# Patient Record
Sex: MALE | Race: WHITE | NOT HISPANIC OR LATINO
[De-identification: names, ages, dates, MRNs, and addresses within clinical notes are randomized per-mention and may not be internally consistent; named-entity substitution may affect disease eponyms.]

---

## 2017-11-14 ENCOUNTER — APPOINTMENT (OUTPATIENT)
Dept: SURGERY | Facility: CLINIC | Age: 72
End: 2017-11-14
Payer: MEDICARE

## 2017-11-14 VITALS
HEART RATE: 66 BPM | DIASTOLIC BLOOD PRESSURE: 68 MMHG | HEIGHT: 67 IN | BODY MASS INDEX: 34.53 KG/M2 | SYSTOLIC BLOOD PRESSURE: 124 MMHG | WEIGHT: 220 LBS

## 2017-11-14 DIAGNOSIS — C34.90 MALIGNANT NEOPLASM OF UNSPECIFIED PART OF UNSPECIFIED BRONCHUS OR LUNG: ICD-10-CM

## 2017-11-14 PROCEDURE — 99203 OFFICE O/P NEW LOW 30 MIN: CPT

## 2017-11-15 ENCOUNTER — APPOINTMENT (OUTPATIENT)
Dept: VASCULAR SURGERY | Facility: CLINIC | Age: 72
End: 2017-11-15

## 2017-11-15 PROBLEM — C34.90 LUNG CANCER: Status: ACTIVE | Noted: 2017-11-15

## 2018-11-15 ENCOUNTER — APPOINTMENT (OUTPATIENT)
Dept: SURGERY | Facility: CLINIC | Age: 73
End: 2018-11-15
Payer: MEDICARE

## 2018-11-15 ENCOUNTER — LABORATORY RESULT (OUTPATIENT)
Age: 73
End: 2018-11-15

## 2018-11-15 PROCEDURE — 36415 COLL VENOUS BLD VENIPUNCTURE: CPT

## 2018-11-15 PROCEDURE — 99213 OFFICE O/P EST LOW 20 MIN: CPT

## 2018-11-16 LAB
T3 SERPL-MCNC: 67 NG/DL
T4 FREE SERPL-MCNC: 1 NG/DL
TSH SERPL-ACNC: 6.12 UIU/ML

## 2018-11-17 LAB
THYROGLOB AB SERPL-ACNC: <20 IU/ML
THYROGLOB SERPL-MCNC: 1.18 NG/ML

## 2018-11-27 ENCOUNTER — RESULT REVIEW (OUTPATIENT)
Age: 73
End: 2018-11-27

## 2018-11-29 ENCOUNTER — TRANSCRIPTION ENCOUNTER (OUTPATIENT)
Age: 73
End: 2018-11-29

## 2020-01-24 ENCOUNTER — TRANSCRIPTION ENCOUNTER (OUTPATIENT)
Age: 75
End: 2020-01-24

## 2020-02-18 ENCOUNTER — APPOINTMENT (OUTPATIENT)
Dept: SURGERY | Facility: CLINIC | Age: 75
End: 2020-02-18

## 2020-07-16 ENCOUNTER — LABORATORY RESULT (OUTPATIENT)
Age: 75
End: 2020-07-16

## 2020-07-16 ENCOUNTER — APPOINTMENT (OUTPATIENT)
Dept: SURGERY | Facility: CLINIC | Age: 75
End: 2020-07-16
Payer: MEDICARE

## 2020-07-16 PROCEDURE — 36415 COLL VENOUS BLD VENIPUNCTURE: CPT

## 2020-07-16 NOTE — HISTORY OF PRESENT ILLNESS
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 denies dysphagia, hoarseness or new lesions.  no changes medically since last visit with exception of worsening renal failure and nephropathy .  Synthroid recently increased to 175 mcg by cardiologist. lung cancer reportedly stable

## 2020-07-16 NOTE — PHYSICAL EXAM
[de-identified] : well healed scar [de-identified] : no palpable thyroid bed nodules. [Laryngoscopy Performed] : laryngoscopy was performed, see procedure section for findings [Midline] : located in midline position [Normal] : orientation to person, place, and time: normal

## 2020-07-16 NOTE — ASSESSMENT
[FreeTextEntry1] : recommended continued observation. no indication for any radiographs at this time. blood tests performed. to call next week for results. to return earlier if any change

## 2020-07-17 LAB
T3 SERPL-MCNC: 71 NG/DL
T4 FREE SERPL-MCNC: 1.4 NG/DL
TSH SERPL-ACNC: 0.61 UIU/ML

## 2020-07-19 LAB
THYROGLOB AB SERPL-ACNC: <20 IU/ML
THYROGLOB SERPL-MCNC: 0.92 NG/ML

## 2022-01-25 ENCOUNTER — APPOINTMENT (OUTPATIENT)
Dept: SURGERY | Facility: CLINIC | Age: 77
End: 2022-01-25
Payer: MEDICARE

## 2022-01-25 PROCEDURE — 99213 OFFICE O/P EST LOW 20 MIN: CPT

## 2022-01-25 NOTE — HISTORY OF PRESENT ILLNESS
[de-identified] : 15  years s/p total thyroidectomy for micro cancer. denies dysphagia, hoarseness or new lesions.  no changes medically since last visit with exception of worsening renal failure and nephropathy .  Synthroid  175 mcg continues.  recent TFTs normal. with suppressed TSH. I have reviewed all old and new data and available images.\par

## 2022-01-25 NOTE — PHYSICAL EXAM
[de-identified] : well healed scar [de-identified] : no palpable thyroid bed nodules. [Laryngoscopy Performed] : laryngoscopy was performed, see procedure section for findings [Midline] : located in midline position [Normal] : orientation to person, place, and time: normal

## 2022-03-21 ENCOUNTER — OUTPATIENT (OUTPATIENT)
Dept: OUTPATIENT SERVICES | Facility: HOSPITAL | Age: 77
LOS: 1 days | End: 2022-03-21

## 2022-03-21 DIAGNOSIS — Z20.828 CONTACT WITH AND (SUSPECTED) EXPOSURE TO OTHER VIRAL COMMUNICABLE DISEASES: ICD-10-CM

## 2022-11-04 ENCOUNTER — OUTPATIENT (OUTPATIENT)
Dept: OUTPATIENT SERVICES | Facility: HOSPITAL | Age: 77
LOS: 1 days | End: 2022-11-04
Payer: MEDICARE

## 2022-11-04 DIAGNOSIS — D64.9 ANEMIA, UNSPECIFIED: ICD-10-CM

## 2022-11-08 ENCOUNTER — RESULT REVIEW (OUTPATIENT)
Age: 77
End: 2022-11-08

## 2022-11-08 ENCOUNTER — APPOINTMENT (OUTPATIENT)
Age: 77
End: 2022-11-08

## 2022-11-08 ENCOUNTER — LABORATORY RESULT (OUTPATIENT)
Age: 77
End: 2022-11-08

## 2022-11-08 VITALS
SYSTOLIC BLOOD PRESSURE: 128 MMHG | WEIGHT: 216.6 LBS | DIASTOLIC BLOOD PRESSURE: 66 MMHG | BODY MASS INDEX: 34 KG/M2 | HEIGHT: 67 IN | OXYGEN SATURATION: 95 % | TEMPERATURE: 97.9 F | HEART RATE: 60 BPM

## 2022-11-08 LAB
BASOPHILS # BLD AUTO: 0.06 K/UL — SIGNIFICANT CHANGE UP (ref 0–0.2)
BASOPHILS NFR BLD AUTO: 0.8 % — SIGNIFICANT CHANGE UP (ref 0–2)
EOSINOPHIL # BLD AUTO: 0.18 K/UL — SIGNIFICANT CHANGE UP (ref 0–0.5)
EOSINOPHIL NFR BLD AUTO: 2.4 % — SIGNIFICANT CHANGE UP (ref 0–6)
HCT VFR BLD CALC: 30.8 % — LOW (ref 39–50)
HGB BLD-MCNC: 9.8 G/DL — LOW (ref 13–17)
IMM GRANULOCYTES NFR BLD AUTO: 0.3 % — SIGNIFICANT CHANGE UP (ref 0–0.9)
LYMPHOCYTES # BLD AUTO: 1.41 K/UL — SIGNIFICANT CHANGE UP (ref 1–3.3)
LYMPHOCYTES # BLD AUTO: 19 % — SIGNIFICANT CHANGE UP (ref 13–44)
MCHC RBC-ENTMCNC: 31.8 GM/DL — LOW (ref 32–36)
MCHC RBC-ENTMCNC: 32.1 PG — SIGNIFICANT CHANGE UP (ref 27–34)
MCV RBC AUTO: 101 FL — HIGH (ref 80–100)
MONOCYTES # BLD AUTO: 0.71 K/UL — SIGNIFICANT CHANGE UP (ref 0–0.9)
MONOCYTES NFR BLD AUTO: 9.6 % — SIGNIFICANT CHANGE UP (ref 2–14)
NEUTROPHILS # BLD AUTO: 5.03 K/UL — SIGNIFICANT CHANGE UP (ref 1.8–7.4)
NEUTROPHILS NFR BLD AUTO: 67.9 % — SIGNIFICANT CHANGE UP (ref 43–77)
NRBC # BLD: 0 /100 WBCS — SIGNIFICANT CHANGE UP (ref 0–0)
PLATELET # BLD AUTO: 306 K/UL — SIGNIFICANT CHANGE UP (ref 150–400)
RBC # BLD: 3.05 M/UL — LOW (ref 4.2–5.8)
RBC # FLD: 13.4 % — SIGNIFICANT CHANGE UP (ref 10.3–14.5)
WBC # BLD: 7.41 K/UL — SIGNIFICANT CHANGE UP (ref 3.8–10.5)
WBC # FLD AUTO: 7.41 K/UL — SIGNIFICANT CHANGE UP (ref 3.8–10.5)

## 2022-11-08 PROCEDURE — 99204 OFFICE O/P NEW MOD 45 MIN: CPT

## 2022-11-11 ENCOUNTER — RESULT REVIEW (OUTPATIENT)
Age: 77
End: 2022-11-11

## 2022-11-11 ENCOUNTER — APPOINTMENT (OUTPATIENT)
Age: 77
End: 2022-11-11

## 2022-11-11 LAB
BASOPHILS # BLD AUTO: 0.05 K/UL — SIGNIFICANT CHANGE UP (ref 0–0.2)
BASOPHILS NFR BLD AUTO: 0.8 % — SIGNIFICANT CHANGE UP (ref 0–2)
EOSINOPHIL # BLD AUTO: 0.14 K/UL — SIGNIFICANT CHANGE UP (ref 0–0.5)
EOSINOPHIL NFR BLD AUTO: 2.2 % — SIGNIFICANT CHANGE UP (ref 0–6)
HCT VFR BLD CALC: 31.4 % — LOW (ref 39–50)
HGB BLD-MCNC: 9.9 G/DL — LOW (ref 13–17)
IMM GRANULOCYTES NFR BLD AUTO: 0.3 % — SIGNIFICANT CHANGE UP (ref 0–0.9)
LYMPHOCYTES # BLD AUTO: 1.16 K/UL — SIGNIFICANT CHANGE UP (ref 1–3.3)
LYMPHOCYTES # BLD AUTO: 18 % — SIGNIFICANT CHANGE UP (ref 13–44)
MCHC RBC-ENTMCNC: 31.5 GM/DL — LOW (ref 32–36)
MCHC RBC-ENTMCNC: 31.9 PG — SIGNIFICANT CHANGE UP (ref 27–34)
MCV RBC AUTO: 101.3 FL — HIGH (ref 80–100)
MONOCYTES # BLD AUTO: 0.52 K/UL — SIGNIFICANT CHANGE UP (ref 0–0.9)
MONOCYTES NFR BLD AUTO: 8.1 % — SIGNIFICANT CHANGE UP (ref 2–14)
NEUTROPHILS # BLD AUTO: 4.54 K/UL — SIGNIFICANT CHANGE UP (ref 1.8–7.4)
NEUTROPHILS NFR BLD AUTO: 70.6 % — SIGNIFICANT CHANGE UP (ref 43–77)
NRBC # BLD: 0 /100 WBCS — SIGNIFICANT CHANGE UP (ref 0–0)
PLATELET # BLD AUTO: 288 K/UL — SIGNIFICANT CHANGE UP (ref 150–400)
RBC # BLD: 3.1 M/UL — LOW (ref 4.2–5.8)
RBC # FLD: 13.2 % — SIGNIFICANT CHANGE UP (ref 10.3–14.5)
WBC # BLD: 6.43 K/UL — SIGNIFICANT CHANGE UP (ref 3.8–10.5)
WBC # FLD AUTO: 6.43 K/UL — SIGNIFICANT CHANGE UP (ref 3.8–10.5)

## 2022-11-14 DIAGNOSIS — D50.9 IRON DEFICIENCY ANEMIA, UNSPECIFIED: ICD-10-CM

## 2022-11-14 LAB
B2 MICROGLOB SERPL-MCNC: 16 MG/L
DEPRECATED KAPPA LC FREE/LAMBDA SER: 2 RATIO
FERRITIN SERPL-MCNC: 69 NG/ML
FOLATE SERPL-MCNC: >20 NG/ML
IGA SER QL IEP: 376 MG/DL
IGG SER QL IEP: 904 MG/DL
IGM SER QL IEP: 134 MG/DL
IRON SATN MFR SERPL: 33 %
IRON SERPL-MCNC: 77 UG/DL
KAPPA LC CSF-MCNC: 6.22 MG/DL
KAPPA LC SERPL-MCNC: 12.47 MG/DL
LDH SERPL-CCNC: 178 U/L
RBC # BLD: 3.09 M/UL
RETICS # AUTO: 2.2 %
RETICS AGGREG/RBC NFR: 68.9 K/UL
TIBC SERPL-MCNC: 232 UG/DL
TRANSFERRIN SERPL-MCNC: 206 MG/DL
UIBC SERPL-MCNC: 155 UG/DL
VIT B12 SERPL-MCNC: 622 PG/ML

## 2022-11-14 NOTE — CONSULT LETTER
[Dear  ___] : Dear  [unfilled], [Consult Letter:] : I had the pleasure of evaluating your patient, [unfilled]. [Please see my note below.] : Please see my note below. [Consult Closing:] : Thank you very much for allowing me to participate in the care of this patient.  If you have any questions, please do not hesitate to contact me. [Sincerely,] : Sincerely, [FreeTextEntry2] : Dr. Mora Rees\par  [FreeTextEntry3] : Michel Flanagan MD\par

## 2022-11-14 NOTE — PHYSICAL EXAM
[Fully active, able to carry on all pre-disease performance without restriction] : Status 0 - Fully active, able to carry on all pre-disease performance without restriction [Normal] : affect appropriate [de-identified] : anicteric

## 2022-11-14 NOTE — HISTORY OF PRESENT ILLNESS
[de-identified] : Mr. Cobian follows with Dr. Rees for chronic kidney disease.  He has been noted to have normocytic anemia.  Mark has a history of thyroid cancer status postresection.  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  Denies any aches or pains, no blood loss, no unintentional weight loss.\par \par Labs available to me revealed a hemoglobin fluctuating between 8 and 10g, with an MCV near 100.  Mark's creatinine has been near 4.\par \par

## 2022-11-14 NOTE — REVIEW OF SYSTEMS
[Fever] : no fever [Fatigue] : no fatigue [Recent Change In Weight] : ~T no recent weight change [Dysphagia] : no dysphagia [Chest Pain] : no chest pain [Shortness Of Breath] : no shortness of breath [Abdominal Pain] : no abdominal pain [Easy Bleeding] : no tendency for easy bleeding [Easy Bruising] : no tendency for easy bruising [Swollen Glands] : no swollen glands

## 2022-11-25 ENCOUNTER — RESULT REVIEW (OUTPATIENT)
Age: 77
End: 2022-11-25

## 2022-11-25 ENCOUNTER — APPOINTMENT (OUTPATIENT)
Age: 77
End: 2022-11-25

## 2022-11-25 LAB
BASOPHILS # BLD AUTO: 0.07 K/UL — SIGNIFICANT CHANGE UP (ref 0–0.2)
BASOPHILS NFR BLD AUTO: 0.9 % — SIGNIFICANT CHANGE UP (ref 0–2)
EOSINOPHIL # BLD AUTO: 0.21 K/UL — SIGNIFICANT CHANGE UP (ref 0–0.5)
EOSINOPHIL NFR BLD AUTO: 2.7 % — SIGNIFICANT CHANGE UP (ref 0–6)
HCT VFR BLD CALC: 33.1 % — LOW (ref 39–50)
HGB BLD-MCNC: 10.6 G/DL — LOW (ref 13–17)
IMM GRANULOCYTES NFR BLD AUTO: 0.4 % — SIGNIFICANT CHANGE UP (ref 0–0.9)
LYMPHOCYTES # BLD AUTO: 1.14 K/UL — SIGNIFICANT CHANGE UP (ref 1–3.3)
LYMPHOCYTES # BLD AUTO: 14.7 % — SIGNIFICANT CHANGE UP (ref 13–44)
MCHC RBC-ENTMCNC: 32 GM/DL — SIGNIFICANT CHANGE UP (ref 32–36)
MCHC RBC-ENTMCNC: 32.1 PG — SIGNIFICANT CHANGE UP (ref 27–34)
MCV RBC AUTO: 100.3 FL — HIGH (ref 80–100)
MONOCYTES # BLD AUTO: 0.53 K/UL — SIGNIFICANT CHANGE UP (ref 0–0.9)
MONOCYTES NFR BLD AUTO: 6.8 % — SIGNIFICANT CHANGE UP (ref 2–14)
NEUTROPHILS # BLD AUTO: 5.79 K/UL — SIGNIFICANT CHANGE UP (ref 1.8–7.4)
NEUTROPHILS NFR BLD AUTO: 74.5 % — SIGNIFICANT CHANGE UP (ref 43–77)
NRBC # BLD: 0 /100 WBCS — SIGNIFICANT CHANGE UP (ref 0–0)
PLATELET # BLD AUTO: 295 K/UL — SIGNIFICANT CHANGE UP (ref 150–400)
RBC # BLD: 3.3 M/UL — LOW (ref 4.2–5.8)
RBC # FLD: 12.9 % — SIGNIFICANT CHANGE UP (ref 10.3–14.5)
WBC # BLD: 7.77 K/UL — SIGNIFICANT CHANGE UP (ref 3.8–10.5)
WBC # FLD AUTO: 7.77 K/UL — SIGNIFICANT CHANGE UP (ref 3.8–10.5)

## 2022-12-09 ENCOUNTER — RESULT REVIEW (OUTPATIENT)
Age: 77
End: 2022-12-09

## 2022-12-09 ENCOUNTER — APPOINTMENT (OUTPATIENT)
Age: 77
End: 2022-12-09

## 2022-12-09 LAB
ALBUMIN MFR SERPL ELPH: 58.2 %
ALBUMIN SERPL-MCNC: 3.8 G/DL
ALBUMIN/GLOB SERPL: 1.4 RATIO
ALPHA1 GLOB MFR SERPL ELPH: 4.1 %
ALPHA1 GLOB SERPL ELPH-MCNC: 0.3 G/DL
ALPHA2 GLOB MFR SERPL ELPH: 11.4 %
ALPHA2 GLOB SERPL ELPH-MCNC: 0.7 G/DL
B-GLOBULIN MFR SERPL ELPH: 12.1 %
B-GLOBULIN SERPL ELPH-MCNC: 0.8 G/DL
B2 MICROGLOB SERPL-MCNC: 13.8 MG/L
BASOPHILS # BLD AUTO: 0.04 K/UL — SIGNIFICANT CHANGE UP (ref 0–0.2)
BASOPHILS NFR BLD AUTO: 0.5 % — SIGNIFICANT CHANGE UP (ref 0–2)
EOSINOPHIL # BLD AUTO: 0.2 K/UL — SIGNIFICANT CHANGE UP (ref 0–0.5)
EOSINOPHIL NFR BLD AUTO: 2.5 % — SIGNIFICANT CHANGE UP (ref 0–6)
GAMMA GLOB FLD ELPH-MCNC: 0.9 G/DL
GAMMA GLOB MFR SERPL ELPH: 14.2 %
HCT VFR BLD CALC: 35 % — LOW (ref 39–50)
HGB BLD-MCNC: 11 G/DL — LOW (ref 13–17)
IMM GRANULOCYTES NFR BLD AUTO: 0.2 % — SIGNIFICANT CHANGE UP (ref 0–0.9)
INTERPRETATION SERPL IEP-IMP: NORMAL
LYMPHOCYTES # BLD AUTO: 1.07 K/UL — SIGNIFICANT CHANGE UP (ref 1–3.3)
LYMPHOCYTES # BLD AUTO: 13.4 % — SIGNIFICANT CHANGE UP (ref 13–44)
M PROTEIN MFR SERPL ELPH: NORMAL
MCHC RBC-ENTMCNC: 30.8 PG — SIGNIFICANT CHANGE UP (ref 27–34)
MCHC RBC-ENTMCNC: 31.4 GM/DL — LOW (ref 32–36)
MCV RBC AUTO: 98 FL — SIGNIFICANT CHANGE UP (ref 80–100)
MONOCLON BAND OBS SERPL: NORMAL
MONOCYTES # BLD AUTO: 0.52 K/UL — SIGNIFICANT CHANGE UP (ref 0–0.9)
MONOCYTES NFR BLD AUTO: 6.5 % — SIGNIFICANT CHANGE UP (ref 2–14)
NEUTROPHILS # BLD AUTO: 6.16 K/UL — SIGNIFICANT CHANGE UP (ref 1.8–7.4)
NEUTROPHILS NFR BLD AUTO: 76.9 % — SIGNIFICANT CHANGE UP (ref 43–77)
NRBC # BLD: 0 /100 WBCS — SIGNIFICANT CHANGE UP (ref 0–0)
PLATELET # BLD AUTO: 336 K/UL — SIGNIFICANT CHANGE UP (ref 150–400)
PROT SERPL-MCNC: 6.5 G/DL
PROT SERPL-MCNC: 6.5 G/DL
RBC # BLD: 3.57 M/UL — LOW (ref 4.2–5.8)
RBC # FLD: 12.7 % — SIGNIFICANT CHANGE UP (ref 10.3–14.5)
WBC # BLD: 8.01 K/UL — SIGNIFICANT CHANGE UP (ref 3.8–10.5)
WBC # FLD AUTO: 8.01 K/UL — SIGNIFICANT CHANGE UP (ref 3.8–10.5)

## 2022-12-12 ENCOUNTER — NON-APPOINTMENT (OUTPATIENT)
Age: 77
End: 2022-12-12

## 2022-12-12 LAB
ALBUMIN SERPL ELPH-MCNC: 3.9 G/DL
ALP BLD-CCNC: 63 U/L
ALT SERPL-CCNC: 12 U/L
ANION GAP SERPL CALC-SCNC: 15 MMOL/L
AST SERPL-CCNC: 9 U/L
BILIRUB SERPL-MCNC: 0.3 MG/DL
BUN SERPL-MCNC: 65 MG/DL
CALCIUM SERPL-MCNC: 9.2 MG/DL
CHLORIDE SERPL-SCNC: 105 MMOL/L
CO2 SERPL-SCNC: 20 MMOL/L
CREAT SERPL-MCNC: 4.24 MG/DL
DEPRECATED KAPPA LC FREE/LAMBDA SER: 2.55 RATIO
DEPRECATED KAPPA LC FREE/LAMBDA SER: 2.55 RATIO
EGFR: 14 ML/MIN/1.73M2
GLUCOSE SERPL-MCNC: 100 MG/DL
IGA SER QL IEP: 330 MG/DL
IGG SER QL IEP: 938 MG/DL
IGM SER QL IEP: 100 MG/DL
KAPPA LC CSF-MCNC: 6.07 MG/DL
KAPPA LC CSF-MCNC: 6.07 MG/DL
KAPPA LC SERPL-MCNC: 15.48 MG/DL
KAPPA LC SERPL-MCNC: 15.48 MG/DL
POTASSIUM SERPL-SCNC: 5.8 MMOL/L
PROT SERPL-MCNC: 6.6 G/DL
SODIUM SERPL-SCNC: 140 MMOL/L

## 2022-12-13 LAB
ALBUMIN MFR SERPL ELPH: 57.1 %
ALBUMIN SERPL-MCNC: 3.8 G/DL
ALBUMIN/GLOB SERPL: 1.4 RATIO
ALPHA1 GLOB MFR SERPL ELPH: 4.2 %
ALPHA1 GLOB SERPL ELPH-MCNC: 0.3 G/DL
ALPHA2 GLOB MFR SERPL ELPH: 11.8 %
ALPHA2 GLOB SERPL ELPH-MCNC: 0.8 G/DL
B-GLOBULIN MFR SERPL ELPH: 12.5 %
B-GLOBULIN SERPL ELPH-MCNC: 0.8 G/DL
GAMMA GLOB FLD ELPH-MCNC: 1 G/DL
GAMMA GLOB MFR SERPL ELPH: 14.4 %
INTERPRETATION SERPL IEP-IMP: NORMAL
M PROTEIN MFR SERPL ELPH: NORMAL
M PROTEIN SPEC IFE-MCNC: NORMAL
MONOCLON BAND OBS SERPL: NORMAL
PROT SERPL-MCNC: 6.6 G/DL
PROT SERPL-MCNC: 6.6 G/DL

## 2022-12-23 ENCOUNTER — RESULT REVIEW (OUTPATIENT)
Age: 77
End: 2022-12-23

## 2022-12-23 ENCOUNTER — APPOINTMENT (OUTPATIENT)
Age: 77
End: 2022-12-23

## 2022-12-23 VITALS
DIASTOLIC BLOOD PRESSURE: 58 MMHG | OXYGEN SATURATION: 94 % | RESPIRATION RATE: 18 BRPM | SYSTOLIC BLOOD PRESSURE: 120 MMHG | TEMPERATURE: 98 F | HEART RATE: 66 BPM

## 2022-12-23 LAB
BASOPHILS # BLD AUTO: 0.05 K/UL — SIGNIFICANT CHANGE UP (ref 0–0.2)
BASOPHILS NFR BLD AUTO: 0.6 % — SIGNIFICANT CHANGE UP (ref 0–2)
EOSINOPHIL # BLD AUTO: 0.15 K/UL — SIGNIFICANT CHANGE UP (ref 0–0.5)
EOSINOPHIL NFR BLD AUTO: 1.8 % — SIGNIFICANT CHANGE UP (ref 0–6)
HCT VFR BLD CALC: 33.3 % — LOW (ref 39–50)
HGB BLD-MCNC: 10.9 G/DL — LOW (ref 13–17)
IMM GRANULOCYTES NFR BLD AUTO: 0.4 % — SIGNIFICANT CHANGE UP (ref 0–0.9)
LYMPHOCYTES # BLD AUTO: 1.24 K/UL — SIGNIFICANT CHANGE UP (ref 1–3.3)
LYMPHOCYTES # BLD AUTO: 14.5 % — SIGNIFICANT CHANGE UP (ref 13–44)
MCHC RBC-ENTMCNC: 31.7 PG — SIGNIFICANT CHANGE UP (ref 27–34)
MCHC RBC-ENTMCNC: 32.7 GM/DL — SIGNIFICANT CHANGE UP (ref 32–36)
MCV RBC AUTO: 96.8 FL — SIGNIFICANT CHANGE UP (ref 80–100)
MONOCYTES # BLD AUTO: 0.49 K/UL — SIGNIFICANT CHANGE UP (ref 0–0.9)
MONOCYTES NFR BLD AUTO: 5.7 % — SIGNIFICANT CHANGE UP (ref 2–14)
NEUTROPHILS # BLD AUTO: 6.61 K/UL — SIGNIFICANT CHANGE UP (ref 1.8–7.4)
NEUTROPHILS NFR BLD AUTO: 77 % — SIGNIFICANT CHANGE UP (ref 43–77)
NRBC # BLD: 0 /100 WBCS — SIGNIFICANT CHANGE UP (ref 0–0)
PLATELET # BLD AUTO: 293 K/UL — SIGNIFICANT CHANGE UP (ref 150–400)
RBC # BLD: 3.44 M/UL — LOW (ref 4.2–5.8)
RBC # FLD: 13 % — SIGNIFICANT CHANGE UP (ref 10.3–14.5)
WBC # BLD: 8.57 K/UL — SIGNIFICANT CHANGE UP (ref 3.8–10.5)
WBC # FLD AUTO: 8.57 K/UL — SIGNIFICANT CHANGE UP (ref 3.8–10.5)

## 2022-12-23 PROCEDURE — 99213 OFFICE O/P EST LOW 20 MIN: CPT

## 2022-12-23 PROCEDURE — 36415 COLL VENOUS BLD VENIPUNCTURE: CPT

## 2022-12-23 PROCEDURE — 85027 COMPLETE CBC AUTOMATED: CPT

## 2022-12-23 PROCEDURE — 96372 THER/PROPH/DIAG INJ SC/IM: CPT

## 2022-12-23 NOTE — PHYSICAL EXAM
[Fully active, able to carry on all pre-disease performance without restriction] : Status 0 - Fully active, able to carry on all pre-disease performance without restriction [Normal] : affect appropriate [de-identified] : anicteric

## 2022-12-23 NOTE — REVIEW OF SYSTEMS
[Fever] : no fever [Chills] : no chills [Night Sweats] : no night sweats [Fatigue] : no fatigue [Recent Change In Weight] : ~T no recent weight change [Dysphagia] : no dysphagia [Chest Pain] : no chest pain [Palpitations] : no palpitations [Lower Ext Edema] : no lower extremity edema [Shortness Of Breath] : no shortness of breath [Wheezing] : no wheezing [Cough] : no cough [SOB on Exertion] : no shortness of breath during exertion [Abdominal Pain] : no abdominal pain [Vomiting] : no vomiting [Constipation] : no constipation [Joint Pain] : no joint pain [Diarrhea] : no diarrhea [Skin Rash] : no skin rash [Skin Wound] : no skin wound [Dizziness] : no dizziness [Difficulty Walking] : no difficulty walking [Easy Bleeding] : no tendency for easy bleeding [Easy Bruising] : no tendency for easy bruising [Swollen Glands] : no swollen glands

## 2022-12-23 NOTE — HISTORY OF PRESENT ILLNESS
[de-identified] : Mr. Cobian follows with Dr. Rees for chronic kidney disease.  He has been noted to have normocytic anemia.  Mark has a history of thyroid cancer status postresection.  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  Denies any aches or pains, no blood loss, no unintentional weight loss.\par \par Labs available to me revealed a hemoglobin fluctuating between 8 and 10g, with an MCV near 100.  Mark's creatinine has been near 4.\par \par  [de-identified] : Mark reports feeling well. He has some STEPHENS, but feels it is related to being "out of shape". No chest pain, palpitations or increased fatigue. No blood per rectum, dark stools or hematuria. He does state he sees his nephrologist, Dr. Rees regularly and will be having an AV fistula placed by Dr. Kate on 12/29 for expected dialysis in the future. His last creatinine was 4.24.

## 2022-12-23 NOTE — RESULTS/DATA
[FreeTextEntry1] : Mr. Cobian is a pleasant 77 year-old man here for evaluation of anemia. Patient being seen as per physician's primary plan of care.\par

## 2022-12-27 LAB
ALBUMIN SERPL ELPH-MCNC: 4 G/DL
ALP BLD-CCNC: 69 U/L
ALT SERPL-CCNC: 10 U/L
ANION GAP SERPL CALC-SCNC: 14 MMOL/L
AST SERPL-CCNC: 8 U/L
BILIRUB SERPL-MCNC: 0.2 MG/DL
BUN SERPL-MCNC: 72 MG/DL
CALCIUM SERPL-MCNC: 9.3 MG/DL
CHLORIDE SERPL-SCNC: 105 MMOL/L
CO2 SERPL-SCNC: 20 MMOL/L
CREAT SERPL-MCNC: 4.25 MG/DL
EGFR: 14 ML/MIN/1.73M2
GLUCOSE SERPL-MCNC: 142 MG/DL
POTASSIUM SERPL-SCNC: 4.7 MMOL/L
PROT SERPL-MCNC: 6.4 G/DL
SODIUM SERPL-SCNC: 139 MMOL/L

## 2022-12-30 ENCOUNTER — OUTPATIENT (OUTPATIENT)
Dept: OUTPATIENT SERVICES | Facility: HOSPITAL | Age: 77
LOS: 1 days | End: 2022-12-30
Payer: MEDICARE

## 2022-12-30 DIAGNOSIS — D50.9 IRON DEFICIENCY ANEMIA, UNSPECIFIED: ICD-10-CM

## 2023-01-06 ENCOUNTER — APPOINTMENT (OUTPATIENT)
Age: 78
End: 2023-01-06

## 2023-01-06 ENCOUNTER — RESULT REVIEW (OUTPATIENT)
Age: 78
End: 2023-01-06

## 2023-01-06 LAB
BASOPHILS # BLD AUTO: 0.06 K/UL — SIGNIFICANT CHANGE UP (ref 0–0.2)
BASOPHILS NFR BLD AUTO: 0.7 % — SIGNIFICANT CHANGE UP (ref 0–2)
EOSINOPHIL # BLD AUTO: 0.21 K/UL — SIGNIFICANT CHANGE UP (ref 0–0.5)
EOSINOPHIL NFR BLD AUTO: 2.5 % — SIGNIFICANT CHANGE UP (ref 0–6)
HCT VFR BLD CALC: 33.6 % — LOW (ref 39–50)
HGB BLD-MCNC: 10.6 G/DL — LOW (ref 13–17)
IMM GRANULOCYTES NFR BLD AUTO: 0.2 % — SIGNIFICANT CHANGE UP (ref 0–0.9)
LYMPHOCYTES # BLD AUTO: 1.32 K/UL — SIGNIFICANT CHANGE UP (ref 1–3.3)
LYMPHOCYTES # BLD AUTO: 15.9 % — SIGNIFICANT CHANGE UP (ref 13–44)
MCHC RBC-ENTMCNC: 30.4 PG — SIGNIFICANT CHANGE UP (ref 27–34)
MCHC RBC-ENTMCNC: 31.5 GM/DL — LOW (ref 32–36)
MCV RBC AUTO: 96.3 FL — SIGNIFICANT CHANGE UP (ref 80–100)
MONOCYTES # BLD AUTO: 0.53 K/UL — SIGNIFICANT CHANGE UP (ref 0–0.9)
MONOCYTES NFR BLD AUTO: 6.4 % — SIGNIFICANT CHANGE UP (ref 2–14)
NEUTROPHILS # BLD AUTO: 6.16 K/UL — SIGNIFICANT CHANGE UP (ref 1.8–7.4)
NEUTROPHILS NFR BLD AUTO: 74.3 % — SIGNIFICANT CHANGE UP (ref 43–77)
NRBC # BLD: 0 /100 WBCS — SIGNIFICANT CHANGE UP (ref 0–0)
PLATELET # BLD AUTO: 317 K/UL — SIGNIFICANT CHANGE UP (ref 150–400)
RBC # BLD: 3.49 M/UL — LOW (ref 4.2–5.8)
RBC # FLD: 13.2 % — SIGNIFICANT CHANGE UP (ref 10.3–14.5)
WBC # BLD: 8.3 K/UL — SIGNIFICANT CHANGE UP (ref 3.8–10.5)
WBC # FLD AUTO: 8.3 K/UL — SIGNIFICANT CHANGE UP (ref 3.8–10.5)

## 2023-01-27 ENCOUNTER — APPOINTMENT (OUTPATIENT)
Age: 78
End: 2023-01-27

## 2023-01-27 ENCOUNTER — RESULT REVIEW (OUTPATIENT)
Age: 78
End: 2023-01-27

## 2023-01-27 LAB
B2 MICROGLOB SERPL-MCNC: 14.5 MG/L
BASOPHILS # BLD AUTO: 0.05 K/UL — SIGNIFICANT CHANGE UP (ref 0–0.2)
BASOPHILS NFR BLD AUTO: 0.6 % — SIGNIFICANT CHANGE UP (ref 0–2)
EOSINOPHIL # BLD AUTO: 0.29 K/UL — SIGNIFICANT CHANGE UP (ref 0–0.5)
EOSINOPHIL NFR BLD AUTO: 3.4 % — SIGNIFICANT CHANGE UP (ref 0–6)
HCT VFR BLD CALC: 32.4 % — LOW (ref 39–50)
HGB BLD-MCNC: 10.4 G/DL — LOW (ref 13–17)
IMM GRANULOCYTES NFR BLD AUTO: 0.2 % — SIGNIFICANT CHANGE UP (ref 0–0.9)
LDH SERPL-CCNC: 159 U/L
LYMPHOCYTES # BLD AUTO: 1.17 K/UL — SIGNIFICANT CHANGE UP (ref 1–3.3)
LYMPHOCYTES # BLD AUTO: 13.8 % — SIGNIFICANT CHANGE UP (ref 13–44)
MCHC RBC-ENTMCNC: 30.3 PG — SIGNIFICANT CHANGE UP (ref 27–34)
MCHC RBC-ENTMCNC: 32.1 GM/DL — SIGNIFICANT CHANGE UP (ref 32–36)
MCV RBC AUTO: 94.5 FL — SIGNIFICANT CHANGE UP (ref 80–100)
MONOCYTES # BLD AUTO: 0.48 K/UL — SIGNIFICANT CHANGE UP (ref 0–0.9)
MONOCYTES NFR BLD AUTO: 5.7 % — SIGNIFICANT CHANGE UP (ref 2–14)
NEUTROPHILS # BLD AUTO: 6.46 K/UL — SIGNIFICANT CHANGE UP (ref 1.8–7.4)
NEUTROPHILS NFR BLD AUTO: 76.3 % — SIGNIFICANT CHANGE UP (ref 43–77)
NRBC # BLD: 0 /100 WBCS — SIGNIFICANT CHANGE UP (ref 0–0)
PLATELET # BLD AUTO: 335 K/UL — SIGNIFICANT CHANGE UP (ref 150–400)
RBC # BLD: 3.43 M/UL — LOW (ref 4.2–5.8)
RBC # FLD: 13.6 % — SIGNIFICANT CHANGE UP (ref 10.3–14.5)
WBC # BLD: 8.47 K/UL — SIGNIFICANT CHANGE UP (ref 3.8–10.5)
WBC # FLD AUTO: 8.47 K/UL — SIGNIFICANT CHANGE UP (ref 3.8–10.5)

## 2023-01-29 LAB
DEPRECATED KAPPA LC FREE/LAMBDA SER: 2.39 RATIO
DEPRECATED KAPPA LC FREE/LAMBDA SER: 2.39 RATIO
IGA SER QL IEP: 311 MG/DL
IGG SER QL IEP: 866 MG/DL
IGM SER QL IEP: 91 MG/DL
KAPPA LC CSF-MCNC: 5.61 MG/DL
KAPPA LC CSF-MCNC: 5.61 MG/DL
KAPPA LC SERPL-MCNC: 13.4 MG/DL
KAPPA LC SERPL-MCNC: 13.4 MG/DL

## 2023-01-31 LAB
ALBUMIN MFR SERPL ELPH: 56.5 %
ALBUMIN SERPL-MCNC: 3.6 G/DL
ALBUMIN/GLOB SERPL: 1.3 RATIO
ALPHA1 GLOB MFR SERPL ELPH: 5.2 %
ALPHA1 GLOB SERPL ELPH-MCNC: 0.3 G/DL
ALPHA2 GLOB MFR SERPL ELPH: 11.9 %
ALPHA2 GLOB SERPL ELPH-MCNC: 0.7 G/DL
B-GLOBULIN MFR SERPL ELPH: 12.6 %
B-GLOBULIN SERPL ELPH-MCNC: 0.8 G/DL
GAMMA GLOB FLD ELPH-MCNC: 0.9 G/DL
GAMMA GLOB MFR SERPL ELPH: 13.8 %
INTERPRETATION SERPL IEP-IMP: NORMAL
M PROTEIN MFR SERPL ELPH: NORMAL
M PROTEIN SPEC IFE-MCNC: NORMAL
MONOCLON BAND OBS SERPL: NORMAL
PROT SERPL-MCNC: 6.3 G/DL
PROT SERPL-MCNC: 6.3 G/DL

## 2023-02-10 ENCOUNTER — RESULT REVIEW (OUTPATIENT)
Age: 78
End: 2023-02-10

## 2023-02-10 ENCOUNTER — APPOINTMENT (OUTPATIENT)
Age: 78
End: 2023-02-10

## 2023-02-10 LAB
BASOPHILS # BLD AUTO: 0.04 K/UL — SIGNIFICANT CHANGE UP (ref 0–0.2)
BASOPHILS NFR BLD AUTO: 0.5 % — SIGNIFICANT CHANGE UP (ref 0–2)
EOSINOPHIL # BLD AUTO: 0.18 K/UL — SIGNIFICANT CHANGE UP (ref 0–0.5)
EOSINOPHIL NFR BLD AUTO: 2.1 % — SIGNIFICANT CHANGE UP (ref 0–6)
HCT VFR BLD CALC: 34.8 % — LOW (ref 39–50)
HGB BLD-MCNC: 10.9 G/DL — LOW (ref 13–17)
IMM GRANULOCYTES NFR BLD AUTO: 0.2 % — SIGNIFICANT CHANGE UP (ref 0–0.9)
LYMPHOCYTES # BLD AUTO: 1.38 K/UL — SIGNIFICANT CHANGE UP (ref 1–3.3)
LYMPHOCYTES # BLD AUTO: 16.1 % — SIGNIFICANT CHANGE UP (ref 13–44)
MCHC RBC-ENTMCNC: 29.8 PG — SIGNIFICANT CHANGE UP (ref 27–34)
MCHC RBC-ENTMCNC: 31.3 GM/DL — LOW (ref 32–36)
MCV RBC AUTO: 95.1 FL — SIGNIFICANT CHANGE UP (ref 80–100)
MONOCYTES # BLD AUTO: 0.52 K/UL — SIGNIFICANT CHANGE UP (ref 0–0.9)
MONOCYTES NFR BLD AUTO: 6.1 % — SIGNIFICANT CHANGE UP (ref 2–14)
NEUTROPHILS # BLD AUTO: 6.42 K/UL — SIGNIFICANT CHANGE UP (ref 1.8–7.4)
NEUTROPHILS NFR BLD AUTO: 75 % — SIGNIFICANT CHANGE UP (ref 43–77)
NRBC # BLD: 0 /100 WBCS — SIGNIFICANT CHANGE UP (ref 0–0)
PLATELET # BLD AUTO: 317 K/UL — SIGNIFICANT CHANGE UP (ref 150–400)
RBC # BLD: 3.66 M/UL — LOW (ref 4.2–5.8)
RBC # FLD: 14.2 % — SIGNIFICANT CHANGE UP (ref 10.3–14.5)
WBC # BLD: 8.56 K/UL — SIGNIFICANT CHANGE UP (ref 3.8–10.5)
WBC # FLD AUTO: 8.56 K/UL — SIGNIFICANT CHANGE UP (ref 3.8–10.5)

## 2023-02-24 ENCOUNTER — RESULT REVIEW (OUTPATIENT)
Age: 78
End: 2023-02-24

## 2023-02-24 ENCOUNTER — APPOINTMENT (OUTPATIENT)
Age: 78
End: 2023-02-24
Payer: MEDICARE

## 2023-02-24 ENCOUNTER — APPOINTMENT (OUTPATIENT)
Age: 78
End: 2023-02-24

## 2023-02-24 LAB
BASOPHILS # BLD AUTO: 0.05 K/UL — SIGNIFICANT CHANGE UP (ref 0–0.2)
BASOPHILS NFR BLD AUTO: 0.5 % — SIGNIFICANT CHANGE UP (ref 0–2)
EOSINOPHIL # BLD AUTO: 0.28 K/UL — SIGNIFICANT CHANGE UP (ref 0–0.5)
EOSINOPHIL NFR BLD AUTO: 2.9 % — SIGNIFICANT CHANGE UP (ref 0–6)
HCT VFR BLD CALC: 34.3 % — LOW (ref 39–50)
HGB BLD-MCNC: 10.6 G/DL — LOW (ref 13–17)
IMM GRANULOCYTES NFR BLD AUTO: 0.4 % — SIGNIFICANT CHANGE UP (ref 0–0.9)
LYMPHOCYTES # BLD AUTO: 1.31 K/UL — SIGNIFICANT CHANGE UP (ref 1–3.3)
LYMPHOCYTES # BLD AUTO: 13.6 % — SIGNIFICANT CHANGE UP (ref 13–44)
MCHC RBC-ENTMCNC: 28.9 PG — SIGNIFICANT CHANGE UP (ref 27–34)
MCHC RBC-ENTMCNC: 30.9 GM/DL — LOW (ref 32–36)
MCV RBC AUTO: 93.5 FL — SIGNIFICANT CHANGE UP (ref 80–100)
MONOCYTES # BLD AUTO: 0.54 K/UL — SIGNIFICANT CHANGE UP (ref 0–0.9)
MONOCYTES NFR BLD AUTO: 5.6 % — SIGNIFICANT CHANGE UP (ref 2–14)
NEUTROPHILS # BLD AUTO: 7.44 K/UL — HIGH (ref 1.8–7.4)
NEUTROPHILS NFR BLD AUTO: 77 % — SIGNIFICANT CHANGE UP (ref 43–77)
NRBC # BLD: 0 /100 WBCS — SIGNIFICANT CHANGE UP (ref 0–0)
PLATELET # BLD AUTO: 366 K/UL — SIGNIFICANT CHANGE UP (ref 150–400)
RBC # BLD: 3.67 M/UL — LOW (ref 4.2–5.8)
RBC # FLD: 14.5 % — SIGNIFICANT CHANGE UP (ref 10.3–14.5)
WBC # BLD: 9.66 K/UL — SIGNIFICANT CHANGE UP (ref 3.8–10.5)
WBC # FLD AUTO: 9.66 K/UL — SIGNIFICANT CHANGE UP (ref 3.8–10.5)

## 2023-02-24 PROCEDURE — 99213 OFFICE O/P EST LOW 20 MIN: CPT

## 2023-02-24 PROCEDURE — 96372 THER/PROPH/DIAG INJ SC/IM: CPT

## 2023-02-24 PROCEDURE — 36415 COLL VENOUS BLD VENIPUNCTURE: CPT

## 2023-02-24 PROCEDURE — 85027 COMPLETE CBC AUTOMATED: CPT

## 2023-02-27 LAB
ALBUMIN SERPL ELPH-MCNC: 4 G/DL
ALP BLD-CCNC: 58 U/L
ALT SERPL-CCNC: 12 U/L
ANION GAP SERPL CALC-SCNC: 17 MMOL/L
AST SERPL-CCNC: 8 U/L
B2 MICROGLOB SERPL-MCNC: 13.4 MG/L
BILIRUB SERPL-MCNC: 0.2 MG/DL
BUN SERPL-MCNC: 99 MG/DL
CALCIUM SERPL-MCNC: 9.2 MG/DL
CHLORIDE SERPL-SCNC: 106 MMOL/L
CO2 SERPL-SCNC: 18 MMOL/L
CREAT SERPL-MCNC: 4.26 MG/DL
EGFR: 14 ML/MIN/1.73M2
FERRITIN SERPL-MCNC: 39 NG/ML
FOLATE SERPL-MCNC: >20 NG/ML
GLUCOSE SERPL-MCNC: 101 MG/DL
POTASSIUM SERPL-SCNC: 5.5 MMOL/L
PROT SERPL-MCNC: 6.4 G/DL
SODIUM SERPL-SCNC: 140 MMOL/L
VIT B12 SERPL-MCNC: 988 PG/ML

## 2023-02-27 NOTE — PHYSICAL EXAM
[Fully active, able to carry on all pre-disease performance without restriction] : Status 0 - Fully active, able to carry on all pre-disease performance without restriction [Normal] : affect appropriate [de-identified] : anicteric

## 2023-02-27 NOTE — HISTORY OF PRESENT ILLNESS
[de-identified] : Mr. Cobian follows with Dr. Rees for chronic kidney disease.  He has been noted to have normocytic anemia.  Mark has a history of thyroid cancer status postresection.  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  Denies any aches or pains, no blood loss, no unintentional weight loss.\par \par Labs available to me revealed a hemoglobin fluctuating between 8 and 10g, with an MCV near 100.  Mark's creatinine has been near 4.\par \par  [de-identified] : Has had a good response to q2 week HIEU, hgb approaching 11g. Not noticing a huge difference in energy or stamina. Continues to follow with Dr. ZOE Julien in [place] continues to mature.

## 2023-02-27 NOTE — REVIEW OF SYSTEMS
[Fever] : no fever [Chills] : no chills [Night Sweats] : no night sweats [Fatigue] : no fatigue [Recent Change In Weight] : ~T no recent weight change [Dysphagia] : no dysphagia [Chest Pain] : no chest pain [Palpitations] : no palpitations [Lower Ext Edema] : no lower extremity edema [Shortness Of Breath] : no shortness of breath [Wheezing] : no wheezing [Cough] : no cough [SOB on Exertion] : no shortness of breath during exertion [Abdominal Pain] : no abdominal pain [Vomiting] : no vomiting [Constipation] : no constipation [Diarrhea] : no diarrhea [Joint Pain] : no joint pain [Skin Rash] : no skin rash [Skin Wound] : no skin wound [Dizziness] : no dizziness [Difficulty Walking] : no difficulty walking [Easy Bleeding] : no tendency for easy bleeding [Easy Bruising] : no tendency for easy bruising [Swollen Glands] : no swollen glands

## 2023-02-28 ENCOUNTER — NON-APPOINTMENT (OUTPATIENT)
Age: 78
End: 2023-02-28

## 2023-02-28 LAB
ALBUMIN MFR SERPL ELPH: 55.9 %
ALBUMIN SERPL-MCNC: 3.6 G/DL
ALBUMIN/GLOB SERPL: 1.3 RATIO
ALPHA1 GLOB MFR SERPL ELPH: 5.5 %
ALPHA1 GLOB SERPL ELPH-MCNC: 0.4 G/DL
ALPHA2 GLOB MFR SERPL ELPH: 12.3 %
ALPHA2 GLOB SERPL ELPH-MCNC: 0.8 G/DL
B-GLOBULIN MFR SERPL ELPH: 12.7 %
B-GLOBULIN SERPL ELPH-MCNC: 0.8 G/DL
DEPRECATED KAPPA LC FREE/LAMBDA SER: 2.49 RATIO
GAMMA GLOB FLD ELPH-MCNC: 0.9 G/DL
GAMMA GLOB MFR SERPL ELPH: 13.6 %
INTERPRETATION SERPL IEP-IMP: NORMAL
KAPPA LC CSF-MCNC: 4.59 MG/DL
KAPPA LC SERPL-MCNC: 11.41 MG/DL
M PROTEIN MFR SERPL ELPH: NORMAL
M PROTEIN SPEC IFE-MCNC: NORMAL
MONOCLON BAND OBS SERPL: NORMAL
PROT SERPL-MCNC: 6.4 G/DL
PROT SERPL-MCNC: 6.4 G/DL

## 2023-03-22 ENCOUNTER — OUTPATIENT (OUTPATIENT)
Dept: OUTPATIENT SERVICES | Facility: HOSPITAL | Age: 78
LOS: 1 days | End: 2023-03-22
Payer: MEDICARE

## 2023-03-22 DIAGNOSIS — D50.9 IRON DEFICIENCY ANEMIA, UNSPECIFIED: ICD-10-CM

## 2023-03-27 ENCOUNTER — RESULT REVIEW (OUTPATIENT)
Age: 78
End: 2023-03-27

## 2023-03-27 ENCOUNTER — APPOINTMENT (OUTPATIENT)
Age: 78
End: 2023-03-27

## 2023-03-27 ENCOUNTER — APPOINTMENT (OUTPATIENT)
Age: 78
End: 2023-03-27
Payer: MEDICARE

## 2023-03-27 VITALS
TEMPERATURE: 97.7 F | HEIGHT: 67 IN | SYSTOLIC BLOOD PRESSURE: 133 MMHG | HEART RATE: 59 BPM | BODY MASS INDEX: 34 KG/M2 | WEIGHT: 216.6 LBS | OXYGEN SATURATION: 97 % | DIASTOLIC BLOOD PRESSURE: 65 MMHG

## 2023-03-27 LAB
BASOPHILS # BLD AUTO: 0.05 K/UL — SIGNIFICANT CHANGE UP (ref 0–0.2)
BASOPHILS NFR BLD AUTO: 0.6 % — SIGNIFICANT CHANGE UP (ref 0–2)
EOSINOPHIL # BLD AUTO: 0.3 K/UL — SIGNIFICANT CHANGE UP (ref 0–0.5)
EOSINOPHIL NFR BLD AUTO: 3.5 % — SIGNIFICANT CHANGE UP (ref 0–6)
HCT VFR BLD CALC: 34.2 % — LOW (ref 39–50)
HGB BLD-MCNC: 10.5 G/DL — LOW (ref 13–17)
IMM GRANULOCYTES NFR BLD AUTO: 0.6 % — SIGNIFICANT CHANGE UP (ref 0–0.9)
LYMPHOCYTES # BLD AUTO: 1.51 K/UL — SIGNIFICANT CHANGE UP (ref 1–3.3)
LYMPHOCYTES # BLD AUTO: 17.6 % — SIGNIFICANT CHANGE UP (ref 13–44)
MCHC RBC-ENTMCNC: 28.8 PG — SIGNIFICANT CHANGE UP (ref 27–34)
MCHC RBC-ENTMCNC: 30.7 GM/DL — LOW (ref 32–36)
MCV RBC AUTO: 94 FL — SIGNIFICANT CHANGE UP (ref 80–100)
MONOCYTES # BLD AUTO: 0.45 K/UL — SIGNIFICANT CHANGE UP (ref 0–0.9)
MONOCYTES NFR BLD AUTO: 5.2 % — SIGNIFICANT CHANGE UP (ref 2–14)
NEUTROPHILS # BLD AUTO: 6.23 K/UL — SIGNIFICANT CHANGE UP (ref 1.8–7.4)
NEUTROPHILS NFR BLD AUTO: 72.5 % — SIGNIFICANT CHANGE UP (ref 43–77)
NRBC # BLD: 0 /100 WBCS — SIGNIFICANT CHANGE UP (ref 0–0)
PLATELET # BLD AUTO: 272 K/UL — SIGNIFICANT CHANGE UP (ref 150–400)
RBC # BLD: 3.64 M/UL — LOW (ref 4.2–5.8)
RBC # FLD: 15.4 % — HIGH (ref 10.3–14.5)
WBC # BLD: 8.59 K/UL — SIGNIFICANT CHANGE UP (ref 3.8–10.5)
WBC # FLD AUTO: 8.59 K/UL — SIGNIFICANT CHANGE UP (ref 3.8–10.5)

## 2023-03-27 PROCEDURE — 99213 OFFICE O/P EST LOW 20 MIN: CPT

## 2023-04-06 NOTE — PHYSICAL EXAM
[Fully active, able to carry on all pre-disease performance without restriction] : Status 0 - Fully active, able to carry on all pre-disease performance without restriction [Normal] : affect appropriate [de-identified] : anicteric [de-identified] : Fistula LUE

## 2023-04-06 NOTE — REVIEW OF SYSTEMS
[Fatigue] : fatigue [Fever] : no fever [Chills] : no chills [Night Sweats] : no night sweats [Recent Change In Weight] : ~T no recent weight change [Vision Problems] : no vision problems [Dysphagia] : no dysphagia [Nosebleeds] : no nosebleeds [Chest Pain] : no chest pain [Palpitations] : no palpitations [Lower Ext Edema] : no lower extremity edema [Shortness Of Breath] : no shortness of breath [Wheezing] : no wheezing [Cough] : no cough [SOB on Exertion] : no shortness of breath during exertion [Abdominal Pain] : no abdominal pain [Vomiting] : no vomiting [Constipation] : no constipation [Diarrhea] : no diarrhea [Joint Pain] : no joint pain [Skin Rash] : no skin rash [Skin Wound] : no skin wound [Dizziness] : no dizziness [Difficulty Walking] : no difficulty walking [Easy Bleeding] : no tendency for easy bleeding [Easy Bruising] : no tendency for easy bruising [Swollen Glands] : no swollen glands

## 2023-04-06 NOTE — HISTORY OF PRESENT ILLNESS
[de-identified] : Mr. Cobian follows with Dr. Rees for chronic kidney disease.  He has been noted to have normocytic anemia.  Mark has a history of thyroid cancer status postresection.  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  Denies any aches or pains, no blood loss, no unintentional weight loss.\par \par Labs available to me revealed a hemoglobin fluctuating between 8 and 10g, with an MCV near 100.  Mark's creatinine has been near 4.\par \par  [de-identified] : Mark continues to have some fatigue, but reports it is no more than it has been. He denies chest pain, SOB, dark stools, blood per rectum.   Continues to follow with Dr. Rees. Fistula in LUE noted.

## 2023-04-06 NOTE — RESULTS/DATA
[FreeTextEntry1] : Mr. Cobian is a pleasant 77 year-old man here for evaluation of anemia. Patient being seen as per physician's primary plan of care.\par \par

## 2023-04-27 ENCOUNTER — LABORATORY RESULT (OUTPATIENT)
Age: 78
End: 2023-04-27

## 2023-04-27 ENCOUNTER — APPOINTMENT (OUTPATIENT)
Age: 78
End: 2023-04-27

## 2023-04-27 ENCOUNTER — RESULT REVIEW (OUTPATIENT)
Age: 78
End: 2023-04-27

## 2023-04-27 LAB
BASOPHILS # BLD AUTO: 0.05 K/UL — SIGNIFICANT CHANGE UP (ref 0–0.2)
BASOPHILS NFR BLD AUTO: 0.6 % — SIGNIFICANT CHANGE UP (ref 0–2)
EOSINOPHIL # BLD AUTO: 0.26 K/UL — SIGNIFICANT CHANGE UP (ref 0–0.5)
EOSINOPHIL NFR BLD AUTO: 3.3 % — SIGNIFICANT CHANGE UP (ref 0–6)
HCT VFR BLD CALC: 33.9 % — LOW (ref 39–50)
HGB BLD-MCNC: 10.3 G/DL — LOW (ref 13–17)
IMM GRANULOCYTES NFR BLD AUTO: 0.3 % — SIGNIFICANT CHANGE UP (ref 0–0.9)
LYMPHOCYTES # BLD AUTO: 1.41 K/UL — SIGNIFICANT CHANGE UP (ref 1–3.3)
LYMPHOCYTES # BLD AUTO: 17.8 % — SIGNIFICANT CHANGE UP (ref 13–44)
MCHC RBC-ENTMCNC: 28.9 PG — SIGNIFICANT CHANGE UP (ref 27–34)
MCHC RBC-ENTMCNC: 30.4 GM/DL — LOW (ref 32–36)
MCV RBC AUTO: 95 FL — SIGNIFICANT CHANGE UP (ref 80–100)
MONOCYTES # BLD AUTO: 0.47 K/UL — SIGNIFICANT CHANGE UP (ref 0–0.9)
MONOCYTES NFR BLD AUTO: 5.9 % — SIGNIFICANT CHANGE UP (ref 2–14)
NEUTROPHILS # BLD AUTO: 5.7 K/UL — SIGNIFICANT CHANGE UP (ref 1.8–7.4)
NEUTROPHILS NFR BLD AUTO: 72.1 % — SIGNIFICANT CHANGE UP (ref 43–77)
NRBC # BLD: 0 /100 WBCS — SIGNIFICANT CHANGE UP (ref 0–0)
PLATELET # BLD AUTO: 321 K/UL — SIGNIFICANT CHANGE UP (ref 150–400)
RBC # BLD: 3.57 M/UL — LOW (ref 4.2–5.8)
RBC # FLD: 17 % — HIGH (ref 10.3–14.5)
WBC # BLD: 7.91 K/UL — SIGNIFICANT CHANGE UP (ref 3.8–10.5)
WBC # FLD AUTO: 7.91 K/UL — SIGNIFICANT CHANGE UP (ref 3.8–10.5)

## 2023-04-27 PROCEDURE — 96372 THER/PROPH/DIAG INJ SC/IM: CPT

## 2023-04-27 PROCEDURE — 85027 COMPLETE CBC AUTOMATED: CPT

## 2023-04-28 LAB
FERRITIN SERPL-MCNC: 50 NG/ML
IRON SATN MFR SERPL: 27 %
IRON SERPL-MCNC: 82 UG/DL
TIBC SERPL-MCNC: 303 UG/DL
TRANSFERRIN SERPL-MCNC: 244 MG/DL
UIBC SERPL-MCNC: 221 UG/DL

## 2023-05-16 ENCOUNTER — OUTPATIENT (OUTPATIENT)
Dept: OUTPATIENT SERVICES | Facility: HOSPITAL | Age: 78
LOS: 1 days | End: 2023-05-16
Payer: MEDICARE

## 2023-05-16 DIAGNOSIS — D50.9 IRON DEFICIENCY ANEMIA, UNSPECIFIED: ICD-10-CM

## 2023-05-24 ENCOUNTER — RESULT REVIEW (OUTPATIENT)
Age: 78
End: 2023-05-24

## 2023-05-24 ENCOUNTER — APPOINTMENT (OUTPATIENT)
Age: 78
End: 2023-05-24

## 2023-05-24 LAB
BASOPHILS # BLD AUTO: 0.05 K/UL — SIGNIFICANT CHANGE UP (ref 0–0.2)
BASOPHILS NFR BLD AUTO: 0.6 % — SIGNIFICANT CHANGE UP (ref 0–2)
EOSINOPHIL # BLD AUTO: 0.2 K/UL — SIGNIFICANT CHANGE UP (ref 0–0.5)
EOSINOPHIL NFR BLD AUTO: 2.5 % — SIGNIFICANT CHANGE UP (ref 0–6)
HCT VFR BLD CALC: 34.3 % — LOW (ref 39–50)
HGB BLD-MCNC: 10.4 G/DL — LOW (ref 13–17)
IMM GRANULOCYTES NFR BLD AUTO: 0.2 % — SIGNIFICANT CHANGE UP (ref 0–0.9)
LYMPHOCYTES # BLD AUTO: 1.07 K/UL — SIGNIFICANT CHANGE UP (ref 1–3.3)
LYMPHOCYTES # BLD AUTO: 13.1 % — SIGNIFICANT CHANGE UP (ref 13–44)
MCHC RBC-ENTMCNC: 29 PG — SIGNIFICANT CHANGE UP (ref 27–34)
MCHC RBC-ENTMCNC: 30.3 GM/DL — LOW (ref 32–36)
MCV RBC AUTO: 95.5 FL — SIGNIFICANT CHANGE UP (ref 80–100)
MONOCYTES # BLD AUTO: 0.51 K/UL — SIGNIFICANT CHANGE UP (ref 0–0.9)
MONOCYTES NFR BLD AUTO: 6.3 % — SIGNIFICANT CHANGE UP (ref 2–14)
NEUTROPHILS # BLD AUTO: 6.3 K/UL — SIGNIFICANT CHANGE UP (ref 1.8–7.4)
NEUTROPHILS NFR BLD AUTO: 77.3 % — HIGH (ref 43–77)
NRBC # BLD: 0 /100 WBCS — SIGNIFICANT CHANGE UP (ref 0–0)
PLATELET # BLD AUTO: 290 K/UL — SIGNIFICANT CHANGE UP (ref 150–400)
RBC # BLD: 3.59 M/UL — LOW (ref 4.2–5.8)
RBC # FLD: 17.2 % — HIGH (ref 10.3–14.5)
WBC # BLD: 8.15 K/UL — SIGNIFICANT CHANGE UP (ref 3.8–10.5)
WBC # FLD AUTO: 8.15 K/UL — SIGNIFICANT CHANGE UP (ref 3.8–10.5)

## 2023-05-25 LAB
ALBUMIN SERPL ELPH-MCNC: 4.2 G/DL
ALP BLD-CCNC: 68 U/L
ALT SERPL-CCNC: 16 U/L
ANION GAP SERPL CALC-SCNC: 16 MMOL/L
AST SERPL-CCNC: 7 U/L
B2 MICROGLOB SERPL-MCNC: 13.3 MG/L
BILIRUB SERPL-MCNC: 0.3 MG/DL
BUN SERPL-MCNC: 77 MG/DL
CALCIUM SERPL-MCNC: 9.3 MG/DL
CHLORIDE SERPL-SCNC: 106 MMOL/L
CO2 SERPL-SCNC: 18 MMOL/L
CREAT SERPL-MCNC: 4.55 MG/DL
DEPRECATED KAPPA LC FREE/LAMBDA SER: 2.12 RATIO
EGFR: 13 ML/MIN/1.73M2
FERRITIN SERPL-MCNC: 33 NG/ML
GLUCOSE SERPL-MCNC: 98 MG/DL
IGA SER QL IEP: 317 MG/DL
IGG SER QL IEP: 785 MG/DL
IGM SER QL IEP: 91 MG/DL
IRON SATN MFR SERPL: 35 %
IRON SERPL-MCNC: 98 UG/DL
KAPPA LC CSF-MCNC: 5.53 MG/DL
KAPPA LC SERPL-MCNC: 11.74 MG/DL
POTASSIUM SERPL-SCNC: 5.1 MMOL/L
PROT SERPL-MCNC: 6.5 G/DL
SODIUM SERPL-SCNC: 140 MMOL/L
TIBC SERPL-MCNC: 283 UG/DL
TRANSFERRIN SERPL-MCNC: 234 MG/DL
UIBC SERPL-MCNC: 185 UG/DL

## 2023-05-30 LAB
ALBUMIN MFR SERPL ELPH: 59.2 %
ALBUMIN SERPL-MCNC: 3.8 G/DL
ALBUMIN/GLOB SERPL: 1.4 RATIO
ALPHA1 GLOB MFR SERPL ELPH: 5 %
ALPHA1 GLOB SERPL ELPH-MCNC: 0.3 G/DL
ALPHA2 GLOB MFR SERPL ELPH: 11.5 %
ALPHA2 GLOB SERPL ELPH-MCNC: 0.7 G/DL
B-GLOBULIN MFR SERPL ELPH: 11.8 %
B-GLOBULIN SERPL ELPH-MCNC: 0.8 G/DL
GAMMA GLOB FLD ELPH-MCNC: 0.8 G/DL
GAMMA GLOB MFR SERPL ELPH: 12.5 %
INTERPRETATION SERPL IEP-IMP: NORMAL
M PROTEIN MFR SERPL ELPH: NORMAL
M PROTEIN SPEC IFE-MCNC: NORMAL
MONOCLON BAND OBS SERPL: NORMAL
PROT SERPL-MCNC: 6.5 G/DL
PROT SERPL-MCNC: 6.5 G/DL

## 2023-06-21 ENCOUNTER — APPOINTMENT (OUTPATIENT)
Age: 78
End: 2023-06-21
Payer: MEDICARE

## 2023-06-21 ENCOUNTER — RESULT REVIEW (OUTPATIENT)
Age: 78
End: 2023-06-21

## 2023-06-21 ENCOUNTER — APPOINTMENT (OUTPATIENT)
Age: 78
End: 2023-06-21

## 2023-06-21 VITALS
RESPIRATION RATE: 17 BRPM | TEMPERATURE: 97.7 F | DIASTOLIC BLOOD PRESSURE: 61 MMHG | HEIGHT: 67 IN | HEART RATE: 60 BPM | SYSTOLIC BLOOD PRESSURE: 110 MMHG | OXYGEN SATURATION: 92 %

## 2023-06-21 VITALS
DIASTOLIC BLOOD PRESSURE: 68 MMHG | SYSTOLIC BLOOD PRESSURE: 130 MMHG | HEIGHT: 67 IN | HEART RATE: 58 BPM | BODY MASS INDEX: 34.06 KG/M2 | WEIGHT: 217 LBS | TEMPERATURE: 98.1 F | OXYGEN SATURATION: 96 %

## 2023-06-21 DIAGNOSIS — D47.2 MONOCLONAL GAMMOPATHY: ICD-10-CM

## 2023-06-21 DIAGNOSIS — D64.9 ANEMIA, UNSPECIFIED: ICD-10-CM

## 2023-06-21 LAB
BASOPHILS # BLD AUTO: 0.03 K/UL — SIGNIFICANT CHANGE UP (ref 0–0.2)
BASOPHILS NFR BLD AUTO: 0.4 % — SIGNIFICANT CHANGE UP (ref 0–2)
EOSINOPHIL # BLD AUTO: 0.2 K/UL — SIGNIFICANT CHANGE UP (ref 0–0.5)
EOSINOPHIL NFR BLD AUTO: 2.4 % — SIGNIFICANT CHANGE UP (ref 0–6)
HCT VFR BLD CALC: 30.1 % — LOW (ref 39–50)
HGB BLD-MCNC: 9.4 G/DL — LOW (ref 13–17)
IMM GRANULOCYTES NFR BLD AUTO: 0.2 % — SIGNIFICANT CHANGE UP (ref 0–0.9)
LYMPHOCYTES # BLD AUTO: 1.09 K/UL — SIGNIFICANT CHANGE UP (ref 1–3.3)
LYMPHOCYTES # BLD AUTO: 13.1 % — SIGNIFICANT CHANGE UP (ref 13–44)
MCHC RBC-ENTMCNC: 30.2 PG — SIGNIFICANT CHANGE UP (ref 27–34)
MCHC RBC-ENTMCNC: 31.2 GM/DL — LOW (ref 32–36)
MCV RBC AUTO: 96.8 FL — SIGNIFICANT CHANGE UP (ref 80–100)
MONOCYTES # BLD AUTO: 0.64 K/UL — SIGNIFICANT CHANGE UP (ref 0–0.9)
MONOCYTES NFR BLD AUTO: 7.7 % — SIGNIFICANT CHANGE UP (ref 2–14)
NEUTROPHILS # BLD AUTO: 6.36 K/UL — SIGNIFICANT CHANGE UP (ref 1.8–7.4)
NEUTROPHILS NFR BLD AUTO: 76.2 % — SIGNIFICANT CHANGE UP (ref 43–77)
NRBC # BLD: 0 /100 WBCS — SIGNIFICANT CHANGE UP (ref 0–0)
PLATELET # BLD AUTO: 299 K/UL — SIGNIFICANT CHANGE UP (ref 150–400)
RBC # BLD: 3.11 M/UL — LOW (ref 4.2–5.8)
RBC # FLD: 17.1 % — HIGH (ref 10.3–14.5)
WBC # BLD: 8.34 K/UL — SIGNIFICANT CHANGE UP (ref 3.8–10.5)
WBC # FLD AUTO: 8.34 K/UL — SIGNIFICANT CHANGE UP (ref 3.8–10.5)

## 2023-06-21 PROCEDURE — 99213 OFFICE O/P EST LOW 20 MIN: CPT

## 2023-06-22 LAB
ALBUMIN SERPL ELPH-MCNC: 3.9 G/DL
ALP BLD-CCNC: 61 U/L
ALT SERPL-CCNC: 12 U/L
ANION GAP SERPL CALC-SCNC: 15 MMOL/L
AST SERPL-CCNC: 5 U/L
B2 MICROGLOB SERPL-MCNC: 14.8 MG/L
BILIRUB SERPL-MCNC: 0.2 MG/DL
BUN SERPL-MCNC: 89 MG/DL
CALCIUM SERPL-MCNC: 8.8 MG/DL
CHLORIDE SERPL-SCNC: 110 MMOL/L
CO2 SERPL-SCNC: 16 MMOL/L
CREAT SERPL-MCNC: 5.63 MG/DL
DEPRECATED KAPPA LC FREE/LAMBDA SER: 1.99 RATIO
EGFR: 10 ML/MIN/1.73M2
GLUCOSE SERPL-MCNC: 115 MG/DL
IGA SER QL IEP: 296 MG/DL
IGG SER QL IEP: 696 MG/DL
IGM SER QL IEP: 89 MG/DL
KAPPA LC CSF-MCNC: 6.24 MG/DL
KAPPA LC SERPL-MCNC: 12.43 MG/DL
POTASSIUM SERPL-SCNC: 5.6 MMOL/L
PROT SERPL-MCNC: 6.4 G/DL
SODIUM SERPL-SCNC: 142 MMOL/L

## 2023-06-29 ENCOUNTER — APPOINTMENT (OUTPATIENT)
Age: 78
End: 2023-06-29

## 2023-06-29 PROCEDURE — 96372 THER/PROPH/DIAG INJ SC/IM: CPT

## 2023-06-29 PROCEDURE — 96374 THER/PROPH/DIAG INJ IV PUSH: CPT

## 2023-06-29 PROCEDURE — 85027 COMPLETE CBC AUTOMATED: CPT

## 2023-07-06 ENCOUNTER — APPOINTMENT (OUTPATIENT)
Age: 78
End: 2023-07-06

## 2023-07-13 ENCOUNTER — TRANSCRIPTION ENCOUNTER (OUTPATIENT)
Age: 78
End: 2023-07-13

## 2023-07-14 ENCOUNTER — APPOINTMENT (OUTPATIENT)
Dept: CARE COORDINATION | Facility: HOME HEALTH | Age: 78
End: 2023-07-14
Payer: MEDICARE

## 2023-07-14 VITALS — RESPIRATION RATE: 16 BRPM

## 2023-07-14 DIAGNOSIS — N40.0 BENIGN PROSTATIC HYPERPLASIA WITHOUT LOWER URINARY TRACT SYMPMS: ICD-10-CM

## 2023-07-14 DIAGNOSIS — E87.70 FLUID OVERLOAD, UNSPECIFIED: ICD-10-CM

## 2023-07-14 DIAGNOSIS — Z85.850 PERSONAL HISTORY OF MALIGNANT NEOPLASM OF THYROID: ICD-10-CM

## 2023-07-14 DIAGNOSIS — I95.1 ORTHOSTATIC HYPOTENSION: ICD-10-CM

## 2023-07-14 DIAGNOSIS — Z99.2 END STAGE RENAL DISEASE: ICD-10-CM

## 2023-07-14 DIAGNOSIS — N18.6 END STAGE RENAL DISEASE: ICD-10-CM

## 2023-07-14 PROCEDURE — 99349 HOME/RES VST EST MOD MDM 40: CPT | Mod: 95

## 2023-07-14 RX ORDER — MIRABEGRON 50 MG/1
50 TABLET, FILM COATED, EXTENDED RELEASE ORAL
Refills: 0 | Status: ACTIVE | COMMUNITY

## 2023-07-14 RX ORDER — LEVOTHYROXINE SODIUM 0.17 MG/1
175 TABLET ORAL DAILY
Refills: 0 | Status: ACTIVE | COMMUNITY

## 2023-07-14 RX ORDER — EPOETIN ALFA-EPBX 10000 [IU]/ML
10000 INJECTION, SOLUTION INTRAVENOUS; SUBCUTANEOUS
Refills: 0 | Status: ACTIVE | COMMUNITY

## 2023-07-14 RX ORDER — PAROXETINE HYDROCHLORIDE 40 MG/1
40 TABLET, FILM COATED ORAL DAILY
Refills: 0 | Status: ACTIVE | COMMUNITY

## 2023-07-20 PROBLEM — E87.70 FLUID OVERLOAD: Status: ACTIVE | Noted: 2023-07-20

## 2023-07-20 PROBLEM — N18.6 ESRD (END STAGE RENAL DISEASE) ON DIALYSIS: Status: ACTIVE | Noted: 2023-07-20

## 2023-07-20 PROBLEM — Z85.850 HISTORY OF THYROID CANCER: Status: ACTIVE | Noted: 2017-11-14

## 2023-07-20 PROBLEM — I95.1 PRIMARY ORTHOSTATIC HYPOTENSION: Status: ACTIVE | Noted: 2023-07-20

## 2023-07-20 PROBLEM — N40.0 BPH (BENIGN PROSTATIC HYPERPLASIA): Status: ACTIVE | Noted: 2023-07-20

## 2023-07-20 RX ORDER — CALCIUM ACETATE 667 MG/1
CAPSULE ORAL
Refills: 0 | Status: ACTIVE | COMMUNITY

## 2023-07-20 NOTE — ASSESSMENT
[FreeTextEntry1] : Patient is a 77 year y/o male enrolled in the [facility] program with a history of CKD, Anemia, Thyroid Cancer, MUGUS, HTN, BPH recently admitted from 6/30/23-7/5/23 to [facility] for Fluid overload and ESRD.  Hospital record reviewed.  Patient present with SOB of breath and weakness.  Patient admitted for fluid overload and ESRD.  Patient started on dialysis, and discharged home with close follow-up.  Patient readmitted from 7/6/23-7/12/23 for per-syncope and admitted for Orthostatic hypotension.  Patient discharge off b/p medications.\par \par Patient observed on tele and on patient alert and oriented x3, and in no acute distress.  Patient states he is breathing better, and his balance has improved.  States his balance still feels a bit off and feel weak, but feels like he is getting stronger every day.  Will follow-up for patient to get home PT.  Patient states he had dialysis yesterday and tolerated it well.  Patient states he is monitoring his blood pressures closely.  States he is still having orthostatic hypotension, systolic will be in the 120s, and will stand and it will drop to the 80's, but will come up if he stands there for a minute.  Patient denies dizziness or lightheadedness.  Advised to change positions slowly and to wait before walking to make sure his b/p stabilizes.  Patient and partner verbalized understanding.  Patient states he is seeing his PCP next week and cardiology at the end of the month.  Advised patient on low sodium diet and fluid restriction.  Patient denies chest pain, palpitations, shortness of breath, abdominal pain, nausea, vomiting, diarrhea, lightheaded or dizziness.\par \par Patient with no other questions or concerns at this time.  Patient given yellow card with contact information on it and advised to call with any questions or concerns.\par

## 2023-07-20 NOTE — REVIEW OF SYSTEMS
[Unsteady Walk] : ataxia [Negative] : Psychiatric [Fever] : no fever [Chills] : no chills [Fatigue] : no fatigue [Chest Pain] : no chest pain [Lower Ext Edema] : no lower extremity edema [Shortness Of Breath] : no shortness of breath [Dyspnea on Exertion] : not dyspnea on exertion [Abdominal Pain] : no abdominal pain [Nausea] : no nausea [Constipation] : no constipation [Diarrhea] : no diarrhea [Vomiting] : no vomiting [Headache] : no headache [Dizziness] : no dizziness

## 2023-07-20 NOTE — HISTORY OF PRESENT ILLNESS
[Home] : at home, [unfilled] , at the time of the visit. [Other Location: e.g. Home (Enter Location, City,State)___] : at [unfilled] [Other:____] : [unfilled] [Verbal consent obtained from patient] : the patient, [unfilled] [FreeTextEntry1] : Follow-up for discharge from [facility] for Fluid overload and ESRD\par  [de-identified] : Patient is a 77 year y/o male enrolled in the [facility] program with a history of CKD, Anemia, Thyroid Cancer, MUGUS, HTN, BPH recently admitted from 6/30/23-7/5/23 to [facility] for Fluid overload and ESRD.  Hospital record reviewed.  Patient present with SOB of breath and weakness.  Patient admitted for fluid overload and ESRD.  Patient started on dialysis, and discharged home with close follow-up.  Patient readmitted from 7/6/23-7/12/23 for per-syncope and admitted for Orthostatic hypotension.  Patient discharge off b/p medications.\par \par Patient observed on tele and on patient alert and oriented x3, and in no acute distress.  Patient states he is breathing better, and his balance has improved.  States his balance still feels a bit off and feel weak, but feels like he is getting stronger every day.  Will follow-up for patient to get home PT.  Patient states he had dialysis yesterday and tolerated it well.  Patient states he is monitoring his blood pressures closely.  States he is still having orthostatic hypotension, systolic will be in the 120s, and will stand and it will drop to the 80's, but will come up if he stands there for a minute.  Patient denies dizziness or lightheadedness.  Advised to change positions slowly and to wait before walking to make sure his b/p stabilizes.  Patient and partner verbalized understanding.  Patient states he is seeing his PCP next week and cardiology at the end of the month.  Advised patient on low sodium diet and fluid restriction.  Patient denies chest pain, palpitations, shortness of breath, abdominal pain, nausea, vomiting, diarrhea, lightheaded or dizziness.\par \par Patient with no other questions or concerns at this time.  Patient given yellow card with contact information on it and advised to call with any questions or concerns.\par \par \par

## 2023-07-20 NOTE — PHYSICAL EXAM
[No Acute Distress] : no acute distress [Well Nourished] : well nourished [Well Developed] : well developed [Well-Appearing] : well-appearing [Normal Sclera/Conjunctiva] : normal sclera/conjunctiva [Normal Outer Ear/Nose] : the outer ears and nose were normal in appearance [No Respiratory Distress] : no respiratory distress  [No Accessory Muscle Use] : no accessory muscle use [No Edema] : there was no peripheral edema [No Rash] : no rash [Normal Affect] : the affect was normal [Alert and Oriented x3] : oriented to person, place, and time [Normal Mood] : the mood was normal [Normal Insight/Judgement] : insight and judgment were intact [de-identified] : Limited due to tele visit

## 2023-07-20 NOTE — PLAN
[FreeTextEntry1] : - Follow-up with PCP, nephrology, and cardiology as scheduled\par - Continue with dialysis as scheduled\par \par - Patient verbalized understanding of plan as above, advised to call with any questions or concerns.  Contact info given to patient.

## 2023-07-22 ENCOUNTER — TRANSCRIPTION ENCOUNTER (OUTPATIENT)
Age: 78
End: 2023-07-22

## 2023-07-25 ENCOUNTER — TRANSCRIPTION ENCOUNTER (OUTPATIENT)
Age: 78
End: 2023-07-25

## 2023-07-26 ENCOUNTER — OUTPATIENT (OUTPATIENT)
Dept: OUTPATIENT SERVICES | Facility: HOSPITAL | Age: 78
LOS: 1 days | End: 2023-07-26

## 2023-07-26 DIAGNOSIS — D50.9 IRON DEFICIENCY ANEMIA, UNSPECIFIED: ICD-10-CM

## 2023-07-28 ENCOUNTER — TRANSCRIPTION ENCOUNTER (OUTPATIENT)
Age: 78
End: 2023-07-28

## 2023-07-28 PROBLEM — D47.2 MGUS (MONOCLONAL GAMMOPATHY OF UNKNOWN SIGNIFICANCE): Status: ACTIVE | Noted: 2022-11-08

## 2023-07-28 PROBLEM — D64.9 ANEMIA: Status: ACTIVE | Noted: 2022-11-08

## 2023-07-28 NOTE — REVIEW OF SYSTEMS
[Fatigue] : fatigue [Fever] : no fever [Chills] : no chills [Night Sweats] : no night sweats [Recent Change In Weight] : ~T no recent weight change [Vision Problems] : no vision problems [Dysphagia] : no dysphagia [Nosebleeds] : no nosebleeds [Chest Pain] : no chest pain [Palpitations] : no palpitations [Lower Ext Edema] : no lower extremity edema [Shortness Of Breath] : no shortness of breath [Wheezing] : no wheezing [Cough] : no cough [SOB on Exertion] : no shortness of breath during exertion [Abdominal Pain] : no abdominal pain [Vomiting] : no vomiting [Constipation] : no constipation [Joint Pain] : no joint pain [Skin Rash] : no skin rash [Skin Wound] : no skin wound [Dizziness] : no dizziness [Difficulty Walking] : no difficulty walking [Easy Bleeding] : no tendency for easy bleeding [Easy Bruising] : no tendency for easy bruising [Swollen Glands] : no swollen glands

## 2023-07-28 NOTE — HISTORY OF PRESENT ILLNESS
[de-identified] : Mr. Cobian follows with Dr. Rees for chronic kidney disease.  He has been noted to have normocytic anemia.  Mark has a history of thyroid cancer status postresection.  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  Denies any aches or pains, no blood loss, no unintentional weight loss.\par \par Labs available to me revealed a hemoglobin fluctuating between 8 and 10g, with an MCV near 100.  Mark's creatinine has been near 4.\par \par  [de-identified] : He denies any ongoing fevers or chills, no headache, no lumps or bumps, no weight loss, no bleeding or bruising. Low-normal iron at our last visit. Hgb decreased today.\par

## 2023-07-28 NOTE — PHYSICAL EXAM
[Fully active, able to carry on all pre-disease performance without restriction] : Status 0 - Fully active, able to carry on all pre-disease performance without restriction [Normal] : affect appropriate [de-identified] : anicteric [de-identified] : Fistula LUE

## 2023-07-28 NOTE — RESULTS/DATA
[FreeTextEntry1] : Mr. Cobian is a pleasant 77 year-old man here for evaluation of anemia.\par \par

## 2023-07-31 ENCOUNTER — APPOINTMENT (OUTPATIENT)
Age: 78
End: 2023-07-31

## 2023-08-02 ENCOUNTER — TRANSCRIPTION ENCOUNTER (OUTPATIENT)
Age: 78
End: 2023-08-02

## 2023-09-06 ENCOUNTER — APPOINTMENT (OUTPATIENT)
Age: 78
End: 2023-09-06

## 2023-10-02 ENCOUNTER — APPOINTMENT (OUTPATIENT)
Dept: NEUROLOGY | Facility: CLINIC | Age: 78
End: 2023-10-02

## 2023-10-18 ENCOUNTER — APPOINTMENT (OUTPATIENT)
Dept: NEPHROLOGY | Facility: CLINIC | Age: 78
End: 2023-10-18

## 2023-10-18 ENCOUNTER — APPOINTMENT (OUTPATIENT)
Dept: TRANSPLANT | Facility: CLINIC | Age: 78
End: 2023-10-18

## 2023-12-17 ENCOUNTER — INPATIENT (INPATIENT)
Facility: HOSPITAL | Age: 78
LOS: 3 days | Discharge: PSYCHIATRIC OR SUBSTANCE USE FACILITY | End: 2023-12-21
Attending: STUDENT IN AN ORGANIZED HEALTH CARE EDUCATION/TRAINING PROGRAM | Admitting: STUDENT IN AN ORGANIZED HEALTH CARE EDUCATION/TRAINING PROGRAM
Payer: MEDICARE

## 2023-12-17 VITALS
HEART RATE: 94 BPM | TEMPERATURE: 98 F | OXYGEN SATURATION: 96 % | SYSTOLIC BLOOD PRESSURE: 149 MMHG | RESPIRATION RATE: 18 BRPM | DIASTOLIC BLOOD PRESSURE: 72 MMHG

## 2023-12-17 DIAGNOSIS — F32.9 MAJOR DEPRESSIVE DISORDER, SINGLE EPISODE, UNSPECIFIED: ICD-10-CM

## 2023-12-17 DIAGNOSIS — R03.0 ELEVATED BLOOD-PRESSURE READING, WITHOUT DIAGNOSIS OF HYPERTENSION: ICD-10-CM

## 2023-12-17 DIAGNOSIS — N18.6 END STAGE RENAL DISEASE: ICD-10-CM

## 2023-12-17 DIAGNOSIS — E78.5 HYPERLIPIDEMIA, UNSPECIFIED: ICD-10-CM

## 2023-12-17 DIAGNOSIS — F33.1 MAJOR DEPRESSIVE DISORDER, RECURRENT, MODERATE: ICD-10-CM

## 2023-12-17 DIAGNOSIS — E03.9 HYPOTHYROIDISM, UNSPECIFIED: ICD-10-CM

## 2023-12-17 LAB
ALBUMIN SERPL ELPH-MCNC: 3.9 G/DL — SIGNIFICANT CHANGE UP (ref 3.3–5)
ALBUMIN SERPL ELPH-MCNC: 3.9 G/DL — SIGNIFICANT CHANGE UP (ref 3.3–5)
ALP SERPL-CCNC: 57 U/L — SIGNIFICANT CHANGE UP (ref 40–120)
ALP SERPL-CCNC: 57 U/L — SIGNIFICANT CHANGE UP (ref 40–120)
ALT FLD-CCNC: 12 U/L — SIGNIFICANT CHANGE UP (ref 4–41)
ALT FLD-CCNC: 12 U/L — SIGNIFICANT CHANGE UP (ref 4–41)
AMPHET UR-MCNC: NEGATIVE — SIGNIFICANT CHANGE UP
AMPHET UR-MCNC: NEGATIVE — SIGNIFICANT CHANGE UP
ANION GAP SERPL CALC-SCNC: 18 MMOL/L — HIGH (ref 7–14)
ANION GAP SERPL CALC-SCNC: 18 MMOL/L — HIGH (ref 7–14)
APAP SERPL-MCNC: <10 UG/ML — LOW (ref 15–25)
APAP SERPL-MCNC: <10 UG/ML — LOW (ref 15–25)
APPEARANCE UR: CLEAR — SIGNIFICANT CHANGE UP
APPEARANCE UR: CLEAR — SIGNIFICANT CHANGE UP
AST SERPL-CCNC: 9 U/L — SIGNIFICANT CHANGE UP (ref 4–40)
AST SERPL-CCNC: 9 U/L — SIGNIFICANT CHANGE UP (ref 4–40)
BACTERIA # UR AUTO: NEGATIVE /HPF — SIGNIFICANT CHANGE UP
BACTERIA # UR AUTO: NEGATIVE /HPF — SIGNIFICANT CHANGE UP
BARBITURATES UR SCN-MCNC: NEGATIVE — SIGNIFICANT CHANGE UP
BARBITURATES UR SCN-MCNC: NEGATIVE — SIGNIFICANT CHANGE UP
BASOPHILS # BLD AUTO: 0.04 K/UL — SIGNIFICANT CHANGE UP (ref 0–0.2)
BASOPHILS # BLD AUTO: 0.04 K/UL — SIGNIFICANT CHANGE UP (ref 0–0.2)
BASOPHILS NFR BLD AUTO: 0.5 % — SIGNIFICANT CHANGE UP (ref 0–2)
BASOPHILS NFR BLD AUTO: 0.5 % — SIGNIFICANT CHANGE UP (ref 0–2)
BENZODIAZ UR-MCNC: NEGATIVE — SIGNIFICANT CHANGE UP
BENZODIAZ UR-MCNC: NEGATIVE — SIGNIFICANT CHANGE UP
BILIRUB SERPL-MCNC: 0.3 MG/DL — SIGNIFICANT CHANGE UP (ref 0.2–1.2)
BILIRUB SERPL-MCNC: 0.3 MG/DL — SIGNIFICANT CHANGE UP (ref 0.2–1.2)
BILIRUB UR-MCNC: NEGATIVE — SIGNIFICANT CHANGE UP
BILIRUB UR-MCNC: NEGATIVE — SIGNIFICANT CHANGE UP
BUN SERPL-MCNC: 84 MG/DL — HIGH (ref 7–23)
BUN SERPL-MCNC: 84 MG/DL — HIGH (ref 7–23)
CALCIUM SERPL-MCNC: 9.5 MG/DL — SIGNIFICANT CHANGE UP (ref 8.4–10.5)
CALCIUM SERPL-MCNC: 9.5 MG/DL — SIGNIFICANT CHANGE UP (ref 8.4–10.5)
CAST: 0 /LPF — SIGNIFICANT CHANGE UP (ref 0–4)
CAST: 0 /LPF — SIGNIFICANT CHANGE UP (ref 0–4)
CHLORIDE SERPL-SCNC: 97 MMOL/L — LOW (ref 98–107)
CHLORIDE SERPL-SCNC: 97 MMOL/L — LOW (ref 98–107)
CO2 SERPL-SCNC: 23 MMOL/L — SIGNIFICANT CHANGE UP (ref 22–31)
CO2 SERPL-SCNC: 23 MMOL/L — SIGNIFICANT CHANGE UP (ref 22–31)
COCAINE METAB.OTHER UR-MCNC: NEGATIVE — SIGNIFICANT CHANGE UP
COCAINE METAB.OTHER UR-MCNC: NEGATIVE — SIGNIFICANT CHANGE UP
COLOR SPEC: YELLOW — SIGNIFICANT CHANGE UP
COLOR SPEC: YELLOW — SIGNIFICANT CHANGE UP
CREAT SERPL-MCNC: 6.68 MG/DL — HIGH (ref 0.5–1.3)
CREAT SERPL-MCNC: 6.68 MG/DL — HIGH (ref 0.5–1.3)
CREATININE URINE RESULT, DAU: 27 MG/DL — SIGNIFICANT CHANGE UP
CREATININE URINE RESULT, DAU: 27 MG/DL — SIGNIFICANT CHANGE UP
DIFF PNL FLD: ABNORMAL
DIFF PNL FLD: ABNORMAL
EGFR: 8 ML/MIN/1.73M2 — LOW
EGFR: 8 ML/MIN/1.73M2 — LOW
EOSINOPHIL # BLD AUTO: 0.13 K/UL — SIGNIFICANT CHANGE UP (ref 0–0.5)
EOSINOPHIL # BLD AUTO: 0.13 K/UL — SIGNIFICANT CHANGE UP (ref 0–0.5)
EOSINOPHIL NFR BLD AUTO: 1.6 % — SIGNIFICANT CHANGE UP (ref 0–6)
EOSINOPHIL NFR BLD AUTO: 1.6 % — SIGNIFICANT CHANGE UP (ref 0–6)
ETHANOL SERPL-MCNC: <10 MG/DL — SIGNIFICANT CHANGE UP
ETHANOL SERPL-MCNC: <10 MG/DL — SIGNIFICANT CHANGE UP
FLUAV AG NPH QL: SIGNIFICANT CHANGE UP
FLUAV AG NPH QL: SIGNIFICANT CHANGE UP
FLUBV AG NPH QL: SIGNIFICANT CHANGE UP
FLUBV AG NPH QL: SIGNIFICANT CHANGE UP
GLUCOSE SERPL-MCNC: 93 MG/DL — SIGNIFICANT CHANGE UP (ref 70–99)
GLUCOSE SERPL-MCNC: 93 MG/DL — SIGNIFICANT CHANGE UP (ref 70–99)
GLUCOSE UR QL: NEGATIVE MG/DL — SIGNIFICANT CHANGE UP
GLUCOSE UR QL: NEGATIVE MG/DL — SIGNIFICANT CHANGE UP
HCT VFR BLD CALC: 29.1 % — LOW (ref 39–50)
HCT VFR BLD CALC: 29.1 % — LOW (ref 39–50)
HGB BLD-MCNC: 9.6 G/DL — LOW (ref 13–17)
HGB BLD-MCNC: 9.6 G/DL — LOW (ref 13–17)
IANC: 5.91 K/UL — SIGNIFICANT CHANGE UP (ref 1.8–7.4)
IANC: 5.91 K/UL — SIGNIFICANT CHANGE UP (ref 1.8–7.4)
IMM GRANULOCYTES NFR BLD AUTO: 0.2 % — SIGNIFICANT CHANGE UP (ref 0–0.9)
IMM GRANULOCYTES NFR BLD AUTO: 0.2 % — SIGNIFICANT CHANGE UP (ref 0–0.9)
KETONES UR-MCNC: NEGATIVE MG/DL — SIGNIFICANT CHANGE UP
KETONES UR-MCNC: NEGATIVE MG/DL — SIGNIFICANT CHANGE UP
LEUKOCYTE ESTERASE UR-ACNC: NEGATIVE — SIGNIFICANT CHANGE UP
LEUKOCYTE ESTERASE UR-ACNC: NEGATIVE — SIGNIFICANT CHANGE UP
LYMPHOCYTES # BLD AUTO: 1.41 K/UL — SIGNIFICANT CHANGE UP (ref 1–3.3)
LYMPHOCYTES # BLD AUTO: 1.41 K/UL — SIGNIFICANT CHANGE UP (ref 1–3.3)
LYMPHOCYTES # BLD AUTO: 17.2 % — SIGNIFICANT CHANGE UP (ref 13–44)
LYMPHOCYTES # BLD AUTO: 17.2 % — SIGNIFICANT CHANGE UP (ref 13–44)
MCHC RBC-ENTMCNC: 33 GM/DL — SIGNIFICANT CHANGE UP (ref 32–36)
MCHC RBC-ENTMCNC: 33 GM/DL — SIGNIFICANT CHANGE UP (ref 32–36)
MCHC RBC-ENTMCNC: 33.6 PG — SIGNIFICANT CHANGE UP (ref 27–34)
MCHC RBC-ENTMCNC: 33.6 PG — SIGNIFICANT CHANGE UP (ref 27–34)
MCV RBC AUTO: 101.7 FL — HIGH (ref 80–100)
MCV RBC AUTO: 101.7 FL — HIGH (ref 80–100)
METHADONE UR-MCNC: NEGATIVE — SIGNIFICANT CHANGE UP
METHADONE UR-MCNC: NEGATIVE — SIGNIFICANT CHANGE UP
MONOCYTES # BLD AUTO: 0.67 K/UL — SIGNIFICANT CHANGE UP (ref 0–0.9)
MONOCYTES # BLD AUTO: 0.67 K/UL — SIGNIFICANT CHANGE UP (ref 0–0.9)
MONOCYTES NFR BLD AUTO: 8.2 % — SIGNIFICANT CHANGE UP (ref 2–14)
MONOCYTES NFR BLD AUTO: 8.2 % — SIGNIFICANT CHANGE UP (ref 2–14)
NEUTROPHILS # BLD AUTO: 5.91 K/UL — SIGNIFICANT CHANGE UP (ref 1.8–7.4)
NEUTROPHILS # BLD AUTO: 5.91 K/UL — SIGNIFICANT CHANGE UP (ref 1.8–7.4)
NEUTROPHILS NFR BLD AUTO: 72.3 % — SIGNIFICANT CHANGE UP (ref 43–77)
NEUTROPHILS NFR BLD AUTO: 72.3 % — SIGNIFICANT CHANGE UP (ref 43–77)
NITRITE UR-MCNC: NEGATIVE — SIGNIFICANT CHANGE UP
NITRITE UR-MCNC: NEGATIVE — SIGNIFICANT CHANGE UP
NRBC # BLD: 0 /100 WBCS — SIGNIFICANT CHANGE UP (ref 0–0)
NRBC # BLD: 0 /100 WBCS — SIGNIFICANT CHANGE UP (ref 0–0)
NRBC # FLD: 0 K/UL — SIGNIFICANT CHANGE UP (ref 0–0)
NRBC # FLD: 0 K/UL — SIGNIFICANT CHANGE UP (ref 0–0)
OPIATES UR-MCNC: NEGATIVE — SIGNIFICANT CHANGE UP
OPIATES UR-MCNC: NEGATIVE — SIGNIFICANT CHANGE UP
OXYCODONE UR-MCNC: NEGATIVE — SIGNIFICANT CHANGE UP
OXYCODONE UR-MCNC: NEGATIVE — SIGNIFICANT CHANGE UP
PCP SPEC-MCNC: SIGNIFICANT CHANGE UP
PCP SPEC-MCNC: SIGNIFICANT CHANGE UP
PCP UR-MCNC: NEGATIVE — SIGNIFICANT CHANGE UP
PCP UR-MCNC: NEGATIVE — SIGNIFICANT CHANGE UP
PH UR: 7.5 — SIGNIFICANT CHANGE UP (ref 5–8)
PH UR: 7.5 — SIGNIFICANT CHANGE UP (ref 5–8)
PLATELET # BLD AUTO: 392 K/UL — SIGNIFICANT CHANGE UP (ref 150–400)
PLATELET # BLD AUTO: 392 K/UL — SIGNIFICANT CHANGE UP (ref 150–400)
POTASSIUM SERPL-MCNC: 4.9 MMOL/L — SIGNIFICANT CHANGE UP (ref 3.5–5.3)
POTASSIUM SERPL-MCNC: 4.9 MMOL/L — SIGNIFICANT CHANGE UP (ref 3.5–5.3)
POTASSIUM SERPL-SCNC: 4.9 MMOL/L — SIGNIFICANT CHANGE UP (ref 3.5–5.3)
POTASSIUM SERPL-SCNC: 4.9 MMOL/L — SIGNIFICANT CHANGE UP (ref 3.5–5.3)
PROT SERPL-MCNC: 6.6 G/DL — SIGNIFICANT CHANGE UP (ref 6–8.3)
PROT SERPL-MCNC: 6.6 G/DL — SIGNIFICANT CHANGE UP (ref 6–8.3)
PROT UR-MCNC: 100 MG/DL
PROT UR-MCNC: 100 MG/DL
RBC # BLD: 2.86 M/UL — LOW (ref 4.2–5.8)
RBC # BLD: 2.86 M/UL — LOW (ref 4.2–5.8)
RBC # FLD: 12.1 % — SIGNIFICANT CHANGE UP (ref 10.3–14.5)
RBC # FLD: 12.1 % — SIGNIFICANT CHANGE UP (ref 10.3–14.5)
RBC CASTS # UR COMP ASSIST: 0 /HPF — SIGNIFICANT CHANGE UP (ref 0–4)
RBC CASTS # UR COMP ASSIST: 0 /HPF — SIGNIFICANT CHANGE UP (ref 0–4)
RSV RNA NPH QL NAA+NON-PROBE: SIGNIFICANT CHANGE UP
RSV RNA NPH QL NAA+NON-PROBE: SIGNIFICANT CHANGE UP
SALICYLATES SERPL-MCNC: <0.3 MG/DL — LOW (ref 15–30)
SALICYLATES SERPL-MCNC: <0.3 MG/DL — LOW (ref 15–30)
SARS-COV-2 RNA SPEC QL NAA+PROBE: SIGNIFICANT CHANGE UP
SARS-COV-2 RNA SPEC QL NAA+PROBE: SIGNIFICANT CHANGE UP
SODIUM SERPL-SCNC: 138 MMOL/L — SIGNIFICANT CHANGE UP (ref 135–145)
SODIUM SERPL-SCNC: 138 MMOL/L — SIGNIFICANT CHANGE UP (ref 135–145)
SP GR SPEC: 1.01 — SIGNIFICANT CHANGE UP (ref 1–1.03)
SP GR SPEC: 1.01 — SIGNIFICANT CHANGE UP (ref 1–1.03)
SQUAMOUS # UR AUTO: 0 /HPF — SIGNIFICANT CHANGE UP (ref 0–5)
SQUAMOUS # UR AUTO: 0 /HPF — SIGNIFICANT CHANGE UP (ref 0–5)
THC UR QL: NEGATIVE — SIGNIFICANT CHANGE UP
THC UR QL: NEGATIVE — SIGNIFICANT CHANGE UP
TOXICOLOGY SCREEN, DRUGS OF ABUSE, SERUM RESULT: SIGNIFICANT CHANGE UP
TOXICOLOGY SCREEN, DRUGS OF ABUSE, SERUM RESULT: SIGNIFICANT CHANGE UP
TSH SERPL-MCNC: 0.42 UIU/ML — SIGNIFICANT CHANGE UP (ref 0.27–4.2)
TSH SERPL-MCNC: 0.42 UIU/ML — SIGNIFICANT CHANGE UP (ref 0.27–4.2)
UROBILINOGEN FLD QL: 0.2 MG/DL — SIGNIFICANT CHANGE UP (ref 0.2–1)
UROBILINOGEN FLD QL: 0.2 MG/DL — SIGNIFICANT CHANGE UP (ref 0.2–1)
WBC # BLD: 8.18 K/UL — SIGNIFICANT CHANGE UP (ref 3.8–10.5)
WBC # BLD: 8.18 K/UL — SIGNIFICANT CHANGE UP (ref 3.8–10.5)
WBC # FLD AUTO: 8.18 K/UL — SIGNIFICANT CHANGE UP (ref 3.8–10.5)
WBC # FLD AUTO: 8.18 K/UL — SIGNIFICANT CHANGE UP (ref 3.8–10.5)
WBC UR QL: 0 /HPF — SIGNIFICANT CHANGE UP (ref 0–5)
WBC UR QL: 0 /HPF — SIGNIFICANT CHANGE UP (ref 0–5)

## 2023-12-17 PROCEDURE — 99223 1ST HOSP IP/OBS HIGH 75: CPT

## 2023-12-17 PROCEDURE — 99285 EMERGENCY DEPT VISIT HI MDM: CPT

## 2023-12-17 PROCEDURE — 71046 X-RAY EXAM CHEST 2 VIEWS: CPT | Mod: 26

## 2023-12-17 RX ORDER — LEVOTHYROXINE SODIUM 125 MCG
175 TABLET ORAL DAILY
Refills: 0 | Status: DISCONTINUED | OUTPATIENT
Start: 2023-12-17 | End: 2023-12-21

## 2023-12-17 RX ORDER — FINASTERIDE 5 MG/1
1 TABLET, FILM COATED ORAL
Refills: 0 | DISCHARGE

## 2023-12-17 RX ORDER — ONDANSETRON 8 MG/1
4 TABLET, FILM COATED ORAL EVERY 8 HOURS
Refills: 0 | Status: DISCONTINUED | OUTPATIENT
Start: 2023-12-17 | End: 2023-12-21

## 2023-12-17 RX ORDER — HEPARIN SODIUM 5000 [USP'U]/ML
5000 INJECTION INTRAVENOUS; SUBCUTANEOUS EVERY 8 HOURS
Refills: 0 | Status: DISCONTINUED | OUTPATIENT
Start: 2023-12-17 | End: 2023-12-21

## 2023-12-17 RX ORDER — ASPIRIN/CALCIUM CARB/MAGNESIUM 324 MG
81 TABLET ORAL DAILY
Refills: 0 | Status: DISCONTINUED | OUTPATIENT
Start: 2023-12-17 | End: 2023-12-21

## 2023-12-17 RX ORDER — ATORVASTATIN CALCIUM 80 MG/1
40 TABLET, FILM COATED ORAL AT BEDTIME
Refills: 0 | Status: DISCONTINUED | OUTPATIENT
Start: 2023-12-17 | End: 2023-12-21

## 2023-12-17 RX ORDER — ZOLPIDEM TARTRATE 10 MG/1
5 TABLET ORAL AT BEDTIME
Refills: 0 | Status: DISCONTINUED | OUTPATIENT
Start: 2023-12-17 | End: 2023-12-21

## 2023-12-17 RX ORDER — ACETAMINOPHEN 500 MG
650 TABLET ORAL EVERY 6 HOURS
Refills: 0 | Status: DISCONTINUED | OUTPATIENT
Start: 2023-12-17 | End: 2023-12-21

## 2023-12-17 RX ORDER — FINASTERIDE 5 MG/1
5 TABLET, FILM COATED ORAL DAILY
Refills: 0 | Status: DISCONTINUED | OUTPATIENT
Start: 2023-12-17 | End: 2023-12-21

## 2023-12-17 RX ORDER — ROSUVASTATIN CALCIUM 5 MG/1
1 TABLET ORAL
Refills: 0 | DISCHARGE

## 2023-12-17 RX ORDER — CHLORHEXIDINE GLUCONATE 213 G/1000ML
1 SOLUTION TOPICAL DAILY
Refills: 0 | Status: DISCONTINUED | OUTPATIENT
Start: 2023-12-17 | End: 2023-12-21

## 2023-12-17 RX ORDER — LANOLIN ALCOHOL/MO/W.PET/CERES
3 CREAM (GRAM) TOPICAL AT BEDTIME
Refills: 0 | Status: DISCONTINUED | OUTPATIENT
Start: 2023-12-17 | End: 2023-12-21

## 2023-12-17 RX ORDER — LEVOTHYROXINE SODIUM 125 MCG
1 TABLET ORAL
Refills: 0 | DISCHARGE

## 2023-12-17 RX ORDER — ESCITALOPRAM OXALATE 10 MG/1
10 TABLET, FILM COATED ORAL DAILY
Refills: 0 | Status: DISCONTINUED | OUTPATIENT
Start: 2023-12-17 | End: 2023-12-21

## 2023-12-17 RX ORDER — ASPIRIN/CALCIUM CARB/MAGNESIUM 324 MG
1 TABLET ORAL
Refills: 0 | DISCHARGE

## 2023-12-17 RX ADMIN — HEPARIN SODIUM 5000 UNIT(S): 5000 INJECTION INTRAVENOUS; SUBCUTANEOUS at 21:40

## 2023-12-17 RX ADMIN — ATORVASTATIN CALCIUM 40 MILLIGRAM(S): 80 TABLET, FILM COATED ORAL at 21:40

## 2023-12-17 RX ADMIN — Medication 3 MILLIGRAM(S): at 21:40

## 2023-12-17 NOTE — ED ADULT NURSE NOTE - NSFALLUNIVINTERV_ED_ALL_ED
Bed/Stretcher in lowest position, wheels locked, appropriate side rails in place/Call bell, personal items and telephone in reach/Instruct patient to call for assistance before getting out of bed/chair/stretcher/Non-slip footwear applied when patient is off stretcher/[place] to call system/Physically safe environment - no spills, clutter or unnecessary equipment/Purposeful proactive rounding/Room/bathroom lighting operational, light cord in reach Bed/Stretcher in lowest position, wheels locked, appropriate side rails in place/Call bell, personal items and telephone in reach/Instruct patient to call for assistance before getting out of bed/chair/stretcher/Non-slip footwear applied when patient is off stretcher/[place] to call system/Physically safe environment - no spills, clutter or unnecessary equipment/Purposeful proactive rounding/Room/bathroom lighting operational, light cord in reach

## 2023-12-17 NOTE — ED BEHAVIORAL HEALTH ASSESSMENT NOTE - PSYCHIATRIC ISSUES AND PLAN (INCLUDE STANDING AND PRN MEDICATION)
Paroxetine 10 mg daily, Lexapro 10 mg daily, PRN Zolpidem 5 mg qhs. For agitation: PRN PO/IM/IV Haldol 5 mg, Ativan 2 mg q 6 Paroxetine 10 mg daily, Lexapro 10 mg daily, PRN Zolpidem 5 mg qhs. For agitation: PRN PO/IM Haldol 2.5 mg (if qtc <500ms), Ativan 1 mg q 6 if patient does not respond to behavioral interventions

## 2023-12-17 NOTE — ED BEHAVIORAL HEALTH ASSESSMENT NOTE - OTHER
[facility] - no records; [place]  see BH note

## 2023-12-17 NOTE — H&P ADULT - NSHPPHYSICALEXAM_GEN_ALL_CORE
PHYSICAL EXAM:  GENERAL: NAD, comfortable at bedside   HEAD:  Atraumatic, Normocephalic  EYES: EOMI, PERRL, conjunctiva and sclera clear  NECK: Supple, No JVD  CHEST/LUNG: Clear to auscultation bilaterally; No wheezes, rales or rhonchi  HEART: Regular rate and rhythm; No murmurs, rubs, or gallops, (+)S1, S2  ABDOMEN: Soft, Nontender, Nondistended; Normal Bowel sounds   EXTREMITIES:  2+ Peripheral Pulses, No clubbing, cyanosis, or edema  PSYCH: low mood  NEUROLOGY: AAOx3, moving all extremities spontaneously   SKIN: No rashes or lesions

## 2023-12-17 NOTE — ED BEHAVIORAL HEALTH ASSESSMENT NOTE - HPI (INCLUDE ILLNESS QUALITY, SEVERITY, DURATION, TIMING, CONTEXT, MODIFYING FACTORS, ASSOCIATED SIGNS AND SYMPTOMS)
Pt is a 79 y/o male, , domiciled at [facility], with pphx of depression, no prior psychiatric hospitalizations but was evaluated at [facility] a few weeks ago and stayed overnight, no hx of SA, no hx of NSSIB, no hx of violence or arrests, no active substance use, w/ pmh significant for hx of lung cancer, hx of thyroid cancer, ESRD, and high cholesterol, BIBself accompanied by daughter for evaluation of worsening depression and suicidal ideation.     On interview, patient reports that he has been very depressed for the past 4 months. He states that he has multiple medical problems and has had traumatic events over the past few months. He reveals that he just wants "nature to take it's course", and that he doesn't "want to go on like this". He states that he wants his medical problems to "take care" of him. He denies suicidal intent or plan. He states that he was also depressed 30 years ago and at that time was started on Paxel as well as started therapy. He has been taking Paxel for the past 30 years. He sees psychiatrist Dr. Dane Ruelas who is now switching him from Paxel to Lexapro. He endorses symptoms of depression including depressed mood, insomnia, anhedonia, guilt, low energy, poor concentration, and passive suicidal ideation. He denies changes in appetite. He denies symptoms of lux including elated mood, distractibility, increase in goal directed activities, flight of ideas, and grandiosity. He denies symptoms of psychosis including AVH, paranoia. Patient states that he has been pretending things are okay but doesn't want to continue going on this. He is seeking psychiatric admission.     See ED Behavioral Health Note for collateral from patient's daughter. Pt is a 79 y/o male, , domiciled at [facility], with pphx of depression, no prior psychiatric hospitalizations but was evaluated at [facility] a few weeks ago and stayed overnight, no hx of SA, no hx of NSSIB, no hx of violence or arrests, no active substance use, w/ pmh significant for hx of lung cancer, hx of thyroid cancer, ESRD, and high cholesterol, BIBself accompanied by daughter for evaluation of worsening depression and suicidal ideation.     On interview, patient reports that he has been very depressed for the past 4 months. He states that he has multiple medical problems and has had traumatic events over the past few months. He reveals that he just wants "nature to take it's course", and that he doesn't "want to go on like this". He states that he wants his medical problems to "take care" of him. He denies suicidal intent or plan. He states that he was also depressed 30 years ago and at that time was started on Paxel as well as started therapy. He has been taking Paxel for the past 30 years. He sees psychiatrist Dr. Dane Ruelas who is now switching him from Paxel to Lexapro. He endorses symptoms of depression including depressed mood, insomnia, anhedonia, guilt, low energy, poor concentration, and passive suicidal ideation. He denies changes in appetite. He denies symptoms of lux including elated mood, distractibility, increase in goal directed activities, flight of ideas, and grandiosity. He denies symptoms of psychosis including AVH, paranoia. Patient states that he has been pretending things are okay but doesn't want to continue going on this. He is seeking psychiatric admission.     See ED Behavioral Health Note for collateral from patient's daughter. Pt is a 79 y/o male, , domiciled at [facility], with pphx of depression, no prior psychiatric hospitalizations but was evaluated at [facility] a few weeks ago and stayed overnight, no hx of SA, no hx of NSSIB, no hx of violence or arrests, no active substance use, w/ pmh significant for hx of lung cancer, hx of thyroid cancer, ESRD on hemodialysis Monday Wednesday Friday, and high cholesterol, BIBself accompanied by daughter for evaluation of worsening depression and suicidal ideation.     On interview, patient reports that he has been very depressed for the past 4 months. He states that he has multiple medical problems and has had traumatic events over the past few months. He reveals that he just wants "nature to take it's course", and that he doesn't "want to go on like this". He states that he wants his medical problems to "take care" of him. He denies suicidal intent or plan. He states that he was also depressed 30 years ago and at that time was started on Paxel as well as started therapy. He has been taking Paxel for the past 30 years. He sees psychiatrist Dr. Dane Ruelas who is now switching him from Paxel to Lexapro. He endorses symptoms of depression including depressed mood, insomnia, anhedonia, guilt, low energy, poor concentration, and passive suicidal ideation. He denies changes in appetite. He denies symptoms of lux including elated mood, distractibility, increase in goal directed activities, flight of ideas, and grandiosity. He denies symptoms of psychosis including AVH, paranoia. Patient states that he has been pretending things are okay but doesn't want to continue going on this. He is seeking psychiatric admission.     See ED Behavioral Health Note for collateral from patient's daughter. Pt is a 77 y/o male, , domiciled at [facility], with pphx of depression, no prior psychiatric hospitalizations but was evaluated at [facility] a few weeks ago and stayed overnight, no hx of SA, no hx of NSSIB, no hx of violence or arrests, no active substance use, w/ pmh significant for hx of lung cancer, hx of thyroid cancer, ESRD on hemodialysis Monday Wednesday Friday, and high cholesterol, BIBself accompanied by daughter for evaluation of worsening depression and suicidal ideation.     On interview, patient reports that he has been very depressed for the past 4 months. He states that he has multiple medical problems and has had traumatic events over the past few months. He reveals that he just wants "nature to take it's course", and that he doesn't "want to go on like this". He states that he wants his medical problems to "take care" of him. He denies suicidal intent or plan. He states that he was also depressed 30 years ago and at that time was started on Paxel as well as started therapy. He has been taking Paxel for the past 30 years. He sees psychiatrist Dr. Dane Ruelas who is now switching him from Paxel to Lexapro. He endorses symptoms of depression including depressed mood, insomnia, anhedonia, guilt, low energy, poor concentration, and passive suicidal ideation. He denies changes in appetite. He denies symptoms of lux including elated mood, distractibility, increase in goal directed activities, flight of ideas, and grandiosity. He denies symptoms of psychosis including AVH, paranoia. Patient states that he has been pretending things are okay but doesn't want to continue going on this. He is seeking psychiatric admission.     See ED Behavioral Health Note for collateral from patient's daughter. Pt is a 77 y/o male, , domiciled at [facility], with pphx of depression, no prior psychiatric hospitalizations but was evaluated at [facility] a few weeks ago and stayed overnight, no hx of SA, no hx of NSSIB, no hx of violence or arrests, no active substance use, w/ pmh significant for hx of lung cancer, hx of thyroid cancer, ESRD on hemodialysis Monday Wednesday Friday, and high cholesterol, BIBself accompanied by daughter for evaluation of worsening depression and suicidal ideation.     On interview, patient reports that he has been very depressed for the past 4 months. He states that he has multiple medical problems and has had traumatic events over the past few months. He reveals that he just wants "nature to take it's course", and that he doesn't "want to go on like this". He states that he wants his medical problems to "take care" of him. He denies suicidal intent or plan. He states that he was also depressed 30 years ago and at that time was started on Paxil as well as started therapy. He has been taking Paxil for the past 30 years. He sees psychiatrist Dr. Dane Ruelas who is now switching him from Paxil to Lexapro. He endorses symptoms of depression including depressed mood, insomnia, anhedonia, guilt, low energy, poor concentration, and passive suicidal ideation. He denies changes in appetite. He denies symptoms of lux including elated mood, distractibility, increase in goal directed activities, flight of ideas, and grandiosity. He denies symptoms of psychosis including AVH, paranoia. Patient states that he has been pretending things are okay but doesn't want to continue going on this. He is seeking psychiatric admission.     See ED Behavioral Health Note for collateral from patient's daughter. Pt is a 79 y/o male, , domiciled at [facility], with pphx of depression, no prior psychiatric hospitalizations but was evaluated at [facility] a few weeks ago and stayed overnight, no hx of SA, no hx of NSSIB, no hx of violence or arrests, no active substance use, w/ pmh significant for hx of lung cancer, hx of thyroid cancer, ESRD on hemodialysis Monday Wednesday Friday, and high cholesterol, BIBself accompanied by daughter for evaluation of worsening depression and suicidal ideation.     On interview, patient reports that he has been very depressed for the past 4 months. He states that he has multiple medical problems and has had traumatic events over the past few months. He reveals that he just wants "nature to take it's course", and that he doesn't "want to go on like this". He states that he wants his medical problems to "take care" of him. He denies suicidal intent or plan. He states that he was also depressed 30 years ago and at that time was started on Paxil as well as started therapy. He has been taking Paxil for the past 30 years. He sees psychiatrist Dr. Dane Ruelas who is now switching him from Paxil to Lexapro. He endorses symptoms of depression including depressed mood, insomnia, anhedonia, guilt, low energy, poor concentration, and passive suicidal ideation. He denies changes in appetite. He denies symptoms of lux including elated mood, distractibility, increase in goal directed activities, flight of ideas, and grandiosity. He denies symptoms of psychosis including AVH, paranoia. Patient states that he has been pretending things are okay but doesn't want to continue going on this. He is seeking psychiatric admission.     See ED Behavioral Health Note for collateral from patient's daughter.

## 2023-12-17 NOTE — ED BEHAVIORAL HEALTH NOTE - BEHAVIORAL HEALTH NOTE
The Drug Utilization Report below displays all of the controlled substance prescriptions, if any, that your patient has filled in the last twelve months. The information displayed on this report is compiled from pharmacy submissions to the Department, and accurately reflects the information as submitted by the pharmacies.    This report was requested by: Shabnam Cummings | Reference #: [id number]    Practitioner Count: 3  Pharmacy Count: 2  Current Opioid Prescriptions: 0  Current Benzodiazepine Prescriptions: 0  Current Stimulant Prescriptions: 0      Patient Demographic Information (PDI)       PDI	First Name	Last Name	Birth Date	Gender	Street Address	[facility]	Zip Code  A	Mark Rodrigezats	1945	Male	[address] [place]	[address]  B	Mark	[facility]	1945	Male	[address]	[facility]	[id number]    Prescription Information      PDI Filter:    PDI	My Rx	Current Rx	Drug Type	Rx Written	Rx Dispensed	Drug	Quantity	Days Supply	Prescriber Name	Prescriber MACI #	Payment Method	Dispenser  A	N	Y		12/05/2023	12/05/2023	zolpidem tartrate 5 mg tablet	30	30	O'Dane Gilliland	[id number]	[facility]  A	N	N		11/21/2023	11/22/2023	zolpidem tartrate 5 mg tablet	7	7	DuranDivina Amado M [id number]	[facility]  B	N	N		10/27/2023	10/27/2023	zolpidem tartrate 5 mg tablet	30	30	Lonnie Osborne MD	[id number]	Medicare	[id number] The Drug Utilization Report below displays all of the controlled substance prescriptions, if any, that your patient has filled in the last twelve months. The information displayed on this report is compiled from pharmacy submissions to the Department, and accurately reflects the information as submitted by the pharmacies.    This report was requested by: Shabnam Cummings | Reference #: [id number]    Practitioner Count: 3  Pharmacy Count: 2  Current Opioid Prescriptions: 0  Current Benzodiazepine Prescriptions: 0  Current Stimulant Prescriptions: 0      Patient Demographic Information (PDI)       PDI	First Name	Last Name	Birth Date	Gender	Street Address	[facility]	Zip Code  A	Mark Rodrigezats	1945	Male	[address] [place]	[address]  B	Mark	[facility]	1945	Male	[address]	[facility]	[id number]    Prescription Information      PDI Filter:    PDI	My Rx	Current Rx	Drug Type	Rx Written	Rx Dispensed	Drug	Quantity	Days Supply	Prescriber Name	Prescriber MACI #	Payment Method	Dispenser  A	N	Y		12/05/2023	12/05/2023	zolpidem tartrate 5 mg tablet	30	30	O'Dane Gilliland	[id number]	[facility]  A	N	N		11/21/2023	11/22/2023	zolpidem tartrate 5 mg tablet	7	7	DuranDivina Amado M [id number]	[facility]  B	N	N		10/27/2023	10/27/2023	zolpidem tartrate 5 mg tablet	30	30	Lonnie Osborne MD	[id number]	Medicare	[id number]

## 2023-12-17 NOTE — ED BEHAVIORAL HEALTH ASSESSMENT NOTE - CURRENT MEDICATION
Velphoro 1000 mg bid before meals, Levothyroxine 175 mg daily, Ivone-jenna 1 mg daily, Rosuvastatin 10 mg daily, Finasteride 5 mg qhs, Aspirin 81 mg daily, PRN Zolpidem 5 mg qhs, Veltassa 8.4 g (takes on non-dialysis days), Paroxetine 10 mg daily, Rexulti 1 mg daily, Lexapro 10 mg daily, PRN Miralax

## 2023-12-17 NOTE — H&P ADULT - PROBLEM SELECTOR PLAN 1
Acute on chronic as unstable  Voluntary admission    consulted: no 1:1 indicated; Paroxetine 10 mg daily, Lexapro 10 mg daily, PRN: Zolpidem 5 mg qhs. For agitation: PRN PO/IM Haldol 2.5 mg (if qtc <500ms), Ativan 1 mg q 6 if patient does not respond to behavioral interventions Acute on chronic as unstable  Voluntary admission    consulted: no 1:1 indicated; Paroxetine 10 mg daily, Lexapro 10 mg daily, PRN: Zolpidem 5 mg qhs. For agitation: PRN PO/IM Haldol 2.5 mg (if qtc <500ms), Ativan 1 mg q 6 if patient does not respond to behavioral interventions  *Rexulti not prescribed as per

## 2023-12-17 NOTE — ED PROVIDER NOTE - OBJECTIVE STATEMENT
Dr Stiles  78-year-old male history of end-stage renal disease on hemodialysis Monday Wednesday Friday, history of lung cancer and thyroid cancer no longer chemoradiation per patient, high cholesterol, anxiety, BPH from home with daughter chief complaint of depression.  States he has felt depressed for "some time" but is worsening lately.  States "I just want make to take his course" admits to feeling suicidal however has no plan or attempt.  States he was evaluated in outside hospital for the same complaint and was advised to have therapy but states I have not had good therapy" symptoms are continuing prompted ER visit.  On review of systems denies any other complaints; no chest pain no shortness of breath no abdominal pain no nausea vomiting.  Denies any alcohol or drug use.

## 2023-12-17 NOTE — H&P ADULT - NSICDXPASTMEDICALHX_GEN_ALL_CORE_FT
PAST MEDICAL HISTORY:  Acquired hypothyroidism     Anxiety and depression     ESRD on dialysis     HLD (hyperlipidemia)     Lung cancer     Thyroid cancer

## 2023-12-17 NOTE — H&P ADULT - NSHPREVIEWOFSYSTEMS_GEN_ALL_CORE
REVIEW OF SYSTEMS:    CONSTITUTIONAL: No weakness, fevers or chills  EYES/ENT: No visual changes;  No dysphagia; No sore throat; No rhinorrhea; No sinus pain/pressure  NECK: No pain or stiffness  RESPIRATORY: No cough, wheezing, hemoptysis; No shortness of breath  CARDIOVASCULAR: No chest pain or palpitations; No lower extremity edema  GASTROINTESTINAL: No abdominal or epigastric pain. No nausea, vomiting, or hematemesis; No diarrhea or constipation. No melena or hematochezia.  GENITOURINARY: No dysuria, frequency or hematuria  NEUROLOGICAL: No numbness or weakness  PSYCH: low mood  MSK: ambulates with a cane PRN  SKIN: No itching, burning, rashes, or lesions   All other review of systems is negative unless indicated above.

## 2023-12-17 NOTE — ED PROVIDER NOTE - CLINICAL SUMMARY MEDICAL DECISION MAKING FREE TEXT BOX
plan EKG, labs for psych clearance, chest x-ray, one-to-one if medically cleared have psychiatry evaluate patient

## 2023-12-17 NOTE — ED BEHAVIORAL HEALTH ASSESSMENT NOTE - NSBHATTESTCOMMENTATTENDFT_PSY_A_CORE
Pt is a 77 y/o male, , domiciled at [facility], with pphx of depression, no prior psychiatric hospitalizations but was evaluated at [facility] a few weeks ago and stayed overnight, no hx of SA, no hx of NSSIB, no hx of violence or arrests, no active substance use, w/ pmh significant for hx of lung cancer, hx of thyroid cancer, ESRD on hemodialysis Monday Wednesday Friday, and high cholesterol, BIBself accompanied by daughter for evaluation of worsening depression and suicidal ideation. Pt is a 77 y/o male, , domiciled at [facility], with pphx of depression, no prior psychiatric hospitalizations but was evaluated at [facility] a few weeks ago and stayed overnight, no hx of SA, no hx of NSSIB, no hx of violence or arrests, no active substance use, w/ pmh significant for hx of lung cancer, hx of thyroid cancer, ESRD on hemodialysis Monday Wednesday Friday, and high cholesterol, BIBself accompanied by daughter for evaluation of worsening depression and suicidal ideation. Pt is a 77 y/o male, , domiciled at [facility], with pphx of depression, no prior psychiatric hospitalizations but was evaluated at [facility] a few weeks ago and stayed overnight, no hx of SA, no hx of NSSIB, no hx of violence or arrests, no active substance use, w/ pmh significant for hx of lung cancer, hx of thyroid cancer, ESRD on hemodialysis Monday Wednesday Friday, and high cholesterol, BIBself accompanied by daughter for evaluation of worsening depression and suicidal ideation.    On evaluation patient presents for sx of acute depressive episode, as evidenced by greater than 2 week history of depressed mood, insomnia, anhedonia, guilt, low energy, poor concentration, and passive suicidal ideation.  Patient is requesting voluntary inpatient psychiatric admission for stabilization of sx and meets criteria for same, however, given patient's dilaysis status, w/o current unit that can accommadate this, recommend admission to medical unit with follow up by [facility] psychiatry team, who have been notified of patient.  Patient denies active SI and reports should he develop same he would inform nursing staff, thus no 1:1 indicated at this time. Recommendations as per plan section above. Pt is a 79 y/o male, , domiciled at [facility], with pphx of depression, no prior psychiatric hospitalizations but was evaluated at [facility] a few weeks ago and stayed overnight, no hx of SA, no hx of NSSIB, no hx of violence or arrests, no active substance use, w/ pmh significant for hx of lung cancer, hx of thyroid cancer, ESRD on hemodialysis Monday Wednesday Friday, and high cholesterol, BIBself accompanied by daughter for evaluation of worsening depression and suicidal ideation.    On evaluation patient presents for sx of acute depressive episode, as evidenced by greater than 2 week history of depressed mood, insomnia, anhedonia, guilt, low energy, poor concentration, and passive suicidal ideation.  Patient is requesting voluntary inpatient psychiatric admission for stabilization of sx and meets criteria for same, however, given patient's dilaysis status, w/o current unit that can accommadate this, recommend admission to medical unit with follow up by [facility] psychiatry team, who have been notified of patient.  Patient denies active SI and reports should he develop same he would inform nursing staff, thus no 1:1 indicated at this time. Recommendations as per plan section above. Pt is a 79 y/o male, , domiciled at [facility], with pphx of depression, no prior psychiatric hospitalizations but was evaluated at [facility] a few weeks ago and stayed overnight, no hx of SA, no hx of NSSIB, no hx of violence or arrests, no active substance use, w/ pmh significant for hx of lung cancer, hx of thyroid cancer, ESRD on hemodialysis Monday Wednesday Friday, and high cholesterol, BIBself accompanied by daughter for evaluation of worsening depression and suicidal ideation.    On evaluation patient presents for sx of acute depressive episode, as evidenced by greater than 2 week history of depressed mood, insomnia, anhedonia, guilt, low energy, poor concentration, and passive suicidal ideation.  Patient is requesting voluntary inpatient psychiatric admission for stabilization of sx and meets criteria for same, however, given patient's dialysis status, w/o current unit that can accommodate this, recommend admission to medical unit with follow up by [facility] psychiatry team, who have been notified of patient.  Patient denies active SI and reports should he develop same he would inform nursing staff, thus no 1:1 indicated at this time. Recommendations as per plan section above.    Patient's home medication regimen as follows:  Velphoro 1000 mg bid before meals, Levothyroxine 175 mg daily, Ivone-jenna 1 mg daily, Rosuvastatin 10 mg daily, Finasteride 5 mg qhs, Aspirin 81 mg daily, PRN Zolpidem 5 mg qhs, Veltassa 8.4 g (takes on non-dialysis days), Paroxetine 10 mg daily, Rexulti 1 mg daily, Lexapro 10 mg daily, PRN Miralax Pt is a 79 y/o male, , domiciled at [facility], with pphx of depression, no prior psychiatric hospitalizations but was evaluated at [facility] a few weeks ago and stayed overnight, no hx of SA, no hx of NSSIB, no hx of violence or arrests, no active substance use, w/ pmh significant for hx of lung cancer, hx of thyroid cancer, ESRD on hemodialysis Monday Wednesday Friday, and high cholesterol, BIBself accompanied by daughter for evaluation of worsening depression and suicidal ideation.    On evaluation patient presents for sx of acute depressive episode, as evidenced by greater than 2 week history of depressed mood, insomnia, anhedonia, guilt, low energy, poor concentration, and passive suicidal ideation.  Patient is requesting voluntary inpatient psychiatric admission for stabilization of sx and meets criteria for same, however, given patient's dialysis status, w/o current unit that can accommodate this, recommend admission to medical unit with follow up by [facility] psychiatry team, who have been notified of patient.  Patient denies active SI and reports should he develop same he would inform nursing staff, thus no 1:1 indicated at this time. Recommendations as per plan section above.    Patient's home medication regimen as follows:  Velphoro 1000 mg bid before meals, Levothyroxine 175 mg daily, Ivone-jenna 1 mg daily, Rosuvastatin 10 mg daily, Finasteride 5 mg qhs, Aspirin 81 mg daily, PRN Zolpidem 5 mg qhs, Veltassa 8.4 g (takes on non-dialysis days), Paroxetine 10 mg daily, Rexulti 1 mg daily, Lexapro 10 mg daily, PRN Miralax

## 2023-12-17 NOTE — ED ADULT NURSE REASSESSMENT NOTE - NS ED NURSE REASSESS COMMENT FT1
Report received from RN. Pt on 1:1, PCA at bedside. Pt denies physical complaints at this time. VS as noted in the flow sheet. Report given to BH, CO to be discontinued. Plan of care ongoing, safety maintained.

## 2023-12-17 NOTE — ED BEHAVIORAL HEALTH ASSESSMENT NOTE - SUBSTANCE ISSUES AND PLAN (INCLUDE STANDING AND PRN MEDICATION)
Consent (Scalp)/Introductory Paragraph: The rationale for Mohs was explained to the patient and consent was obtained. The risks, benefits and alternatives to therapy were discussed in detail. Specifically, the risks of changes in hair growth pattern secondary to repair, infection, scarring, bleeding, prolonged wound healing, incomplete removal, allergy to anesthesia, nerve injury and recurrence were addressed. Prior to the procedure, the treatment site was clearly identified and confirmed by the patient. All components of Universal Protocol/PAUSE Rule completed. N/A

## 2023-12-17 NOTE — ED PROVIDER NOTE - PHYSICAL EXAMINATION
Dr Stiles  Vital signs noted.  Elderly male ambulatory with a cane ANO x 3.  No facial symmetry no slurred speech.    No sign of  head or neck trauma. no respiratory distress.    soft nondistended Nontender abdomen.   Fistula to the left forearm.

## 2023-12-17 NOTE — H&P ADULT - NSHPLABSRESULTS_GEN_ALL_CORE
9.6    8.18  )-----------( 392      ( 17 Dec 2023 11:39 )             29.1     138  |  97<L>  |  84<H>  ----------------------------<  93       4.9   |  23  |  6.68<H>    Ca    9.5      17 Dec 2023 11:39    TPro  6.6  /  Alb  3.9  /  TBili  0.3  /  DBili  x   /  AST  9   /  ALT  12  /  AlkPhos  57          Urinalysis Basic - ( 17 Dec 2023 11:39 )  Color: Yellow / Appearance: Clear / S.009 / pH: 7.5  Gluc: Negative mg/dL / Ketone: Negative mg/dL  / Bili: Negative / Urobili: 0.2 mg/dL   Blood: Trace / Protein: 100 mg/dL / Nitrite: Negative   Leuk Esterase: Negative / RBC: 0 /HPF / WBC 0 /HPF   Sq Epi: x / Non Sq Epi: x / Bacteria: Negative /HPF      EKG interpreted by myself: NSR  CXR interpreted by myself: clear lungs

## 2023-12-17 NOTE — ED PROVIDER NOTE - PROGRESS NOTE DETAILS
blood work resulted patient anemic at baseline, CKD with normal potassium.  UA not infected EKG unremarkable. Psych to see pt. pt and daughter updated. SPoke w charge RN, pt may be moved to . cancel 1:1 EKG sr HR 88 qt/qtc 378/457 Psychiatry  evaluated pt, pt to be admitted voluntary, however since he is on HD would need medical admission . plan admit to med. GERALD Sanchez

## 2023-12-17 NOTE — ED BEHAVIORAL HEALTH ASSESSMENT NOTE - SUMMARY
Pt is a 79 y/o male, , domiciled at [facility], with pphx of depression, no prior psychiatric hospitalizations but was evaluated at [facility] a few weeks ago and stayed overnight, no hx of SA, no hx of NSSIB, no hx of violence or arrests, no active substance use, w/ pmh significant for hx of lung cancer, hx of thyroid cancer, ESRD, and high cholesterol, BIBself accompanied by daughter for evaluation of worsening depression and suicidal ideation.     On assessment, patient endorses worsening depressive symptoms over the past 4 months including depressed mood, insomnia, anhedonia, guilt, low energy, poor concentration, and suicidal ideation. He reports passive suicidal ideation, stating that he wants "nature to take him", and for his medical problems to "take care of" him. Collateral from patient's daughter is significant for recent breakup with patient's longtime partner who he used to live with. Pt is seeking psychiatric admission for management of depressive symptoms. Patient will be admitted to medicine with further evaluation and management by  psychiatry. Pt is a 79 y/o male, , domiciled at [facility], with pphx of depression, no prior psychiatric hospitalizations but was evaluated at [facility] a few weeks ago and stayed overnight, no hx of SA, no hx of NSSIB, no hx of violence or arrests, no active substance use, w/ pmh significant for hx of lung cancer, hx of thyroid cancer, ESRD, and high cholesterol, BIBself accompanied by daughter for evaluation of worsening depression and suicidal ideation.     On assessment, patient endorses worsening depressive symptoms over the past 4 months including depressed mood, insomnia, anhedonia, guilt, low energy, poor concentration, and suicidal ideation. He reports passive suicidal ideation, stating that he wants "nature to take him", and for his medical problems to "take care of" him. Collateral from patient's daughter is significant for recent breakup with patient's longtime partner who he used to live with. Pt is seeking psychiatric admission for management of depressive symptoms. Patient will be admitted to medicine with further evaluation and management by  psychiatry. Pt is a 77 y/o male, , domiciled at [facility], with pphx of depression, no prior psychiatric hospitalizations [facility], no hx of SA, no hx of NSSIB, no hx of violence or arrests, no active substance use, w/ pmh significant for hx of lung cancer, hx of thyroid cancer, ESRD on hemodialysis Monday Wednesday Friday, and high cholesterol, BIBself accompanied by daughter for evaluation of worsening depression and suicidal ideation.     On assessment, patient endorses worsening depressive symptoms over the past 4 months including depressed mood, insomnia, anhedonia, guilt, low energy, poor concentration, and suicidal ideation. He reports passive suicidal ideation, stating that he wants "nature to take him", and for his medical problems to "take care of" him. Collateral from patient's daughter is significant for recent breakup with patient's longtime partner who he used to live with. Pt is seeking psychiatric admission for management of depressive symptoms. Patient will be admitted to medicine with further evaluation and management by  psychiatry. Pt is a 77 y/o male, , domiciled at [facility], with pphx of depression, no prior psychiatric hospitalizations but was evaluated at [facility] a few weeks ago and stayed overnight, no hx of SA, no hx of NSSIB, no hx of violence or arrests, no active substance use, w/ pmh significant for hx of lung cancer, hx of thyroid cancer, ESRD on hemodialysis Monday Wednesday Friday, and high cholesterol, BIBself accompanied by daughter for evaluation of worsening depression and suicidal ideation.     On assessment, patient endorses worsening depressive symptoms over the past 4 months including depressed mood, insomnia, anhedonia, guilt, low energy, poor concentration, and suicidal ideation. He reports passive suicidal ideation, stating that he wants "nature to take him", and for his medical problems to "take care of" him. Collateral from patient's daughter is significant for recent breakup with patient's longtime partner who he used to live with. Pt is seeking psychiatric admission for management of depressive symptoms. Patient will be admitted to medicine with further evaluation and management by  psychiatry.

## 2023-12-17 NOTE — ED ADULT TRIAGE NOTE - CHIEF COMPLAINT QUOTE
pt states "im very depressed".  pt states he "might be suicidal".  denies HI.  Hx:  ESRD, lung ca, thyroid ca, endartectomy.

## 2023-12-17 NOTE — ED ADULT NURSE NOTE - OBJECTIVE STATEMENT
Received pt into spot 4, accompanied by daughter. Pt calm , cooperative, flat affect . Endorsing feeling very depressed and has been having suicidal ideations without a plan. Pt expresses feeling very sad , having gone through many life changes the past couple of years. PHx ESRD on HD via left arm AV fistula M-W-F. Last session this past Friday. Denies any medical complaints. Abdomen soft/ nondistended, nontender to palpation. Pt denies any urinary symptoms or bowel complaints. Pt does produce urine. Breathing is easy and unlabored. Positive thrill to left arm av fistula. Skin warm dy and intact. Pt denies any dizziness, nausea, vomiting, diarrhea,  palpitations , fever/chills, recent cold/cough, sick contacts. Pt eval by Dr. Singh and Dr. Stiles. Blood work obtained and sent. #20 gauge angiocath placed to right antecubital saline licked. Pt then taken to xray dept for CXR. Pt has been placed on constant observation for safety, risk for elopement and risk for harm to self. Psychiatry consult ordered. Pt and daughter verbalized understanding of plan of care.

## 2023-12-17 NOTE — H&P ADULT - HISTORY OF PRESENT ILLNESS
78 yr old male with a pmh of ESRD on HD MWF, hx of lung cancer and thyroid cancer (no longer on treatment), HLD, Anxiety/MDD, BPH who presents with progressive depressive symptoms since August 2023. Detailed hx of progression per  note- since 8/2023 HD was initiated and pt's long term relationship ended.   Denies any suicidal ideations, homicidal ideations, visual/auditory hallucinations.   Denies  headache, dizziness, chest pain, palpitations, SOB, abdominal pain, joint pain, diarrhea/constipation, urinary symptoms.   Vitals: T98.2, HR 90, BP  154/68, RR 16 satting 100% RA

## 2023-12-17 NOTE — ED BEHAVIORAL HEALTH ASSESSMENT NOTE - RISK ASSESSMENT
Risk factors: +current passive SI, chronic medical conditions, recent breakup    Protective factors: no current HIIP, no h/o SA/SIB, no h/o psych admissions, no access to weapons, no active substance abuse, domiciled, social supports, engaged in treatment, compliant with treatment, help-seeking behaviors    Overall, pt is at moderate risk of harm and does meet criteria for psychiatric admission.

## 2023-12-17 NOTE — ED BEHAVIORAL HEALTH ASSESSMENT NOTE - DESCRIPTION
hx of lung cancer, hx of thyroid cancer, ESRD, and high cholesterol See HPI Patient has required no PRN medications.     ICU Vital Signs Last 24 Hrs  T(C): 36.8 (17 Dec 2023 12:30), Max: 36.8 (17 Dec 2023 11:55)  T(F): 98.2 (17 Dec 2023 12:30), Max: 98.2 (17 Dec 2023 11:55)  HR: 90 (17 Dec 2023 12:30) (87 - 94)  BP: 154/68 (17 Dec 2023 12:30) (149/72 - 165/75)  BP(mean): --  ABP: --  ABP(mean): --  RR: 16 (17 Dec 2023 12:30) (16 - 18)  SpO2: 100% (17 Dec 2023 12:30) (96% - 100%)    O2 Parameters below as of 17 Dec 2023 12:30  Patient On (Oxygen Delivery Method): room air

## 2023-12-17 NOTE — ED BEHAVIORAL HEALTH NOTE - BEHAVIORAL HEALTH NOTE
SW was requested by provider to obtain collateral information from pt daughter Jn Sainz # [phone].   The following is as per pt daughter Jn:  Jn shared that pt resides at [facility] for the past 2 months. Jn explained that due to pt age , medical needs and to be closer to her.  She stated that the past 4-5 months has been very difficult for pt. In August 2023 pt was placed on dialysis (3x a week), in Sept pt had heart blockage surgery and at the end of October his relationship with long time live in girlfriend ended on bad terms.  Jn stated that all of these things happening changed pt and especially the relationship ending with pt girlfriend.  She state that for the past few weeks pt keeps saying " I think I need a hospital". When asked pt stated that "at the hospital they will make me comfortable"  Jn expressed that pt is thinking about his life, pt wants to escape and expressed that "nature to hurry up and take him" and that "I feel like nothing" .    Jn expressed that she does not believe pt is in imminent danger of hurting himself. She stated that pt has not expressed any SI/HI or A/V/H. She stated that pt is sleeping however reports terrible dreams and after cant get back to sleep.  She reported that pt hygiene and eating is fair at this time.    She stated that recently she took pt to [facility] psych- pt was observed for one night. Once pt was discharge pt expressed to her that he is not that "sick as the other people there".  Jn stated that she help pt to get connected to a psychiatrist at [facility] ([place])- Pt has a PA: Dr. Dane Ruelas # [phone]- pt next appointment is 12/19/23 at 1230pm.    Jn stated that she found out that [facility] has a ar psych beds so this is another reason she brought pt here. Jn shared that her hope is for pt to be admitted and get the help he needs. Jn stated that pt use to be very outgoing, cheerful, and since all of this especially the break up pt changed.        Jn shared that pt has been diagnosis with depression for the past 30 years; she recalls this from her childhood. She stated that pt has been taking Paxil since then and it appears to have lost its effectiveness.  She state that the psychiatrist is weaning pt off the Paxil.  Pt is currently prescribed: Paxil 10mg (tappering off)  Lexapro 10mg, Rexulti 1mg and Ambien.    SW shared this information with provider. SW was requested by provider to obtain collateral information from pt daughter Jn Sainz # [phone].   The following is as per pt daughter Jn:  Jn shared that pt resides at [facility] for the past 2 months. Jn explained that due to pt age , medical needs and to be closer to her.  She stated that the past 4-5 months has been very difficult for pt. In August 2023 pt was placed on dialysis (3x a week), in Sept pt had heart blockage surgery and at the end of October his relationship with long time live in girlfriend ended on bad terms.  Jn stated that all of these things happening changed pt and especially the relationship ending with pt girlfriend.  She state that for the past few weeks pt keeps saying " I think I need a hospital". When asked pt stated that "at the hospital they will make me comfortable"  Jn expressed that pt is thinking about his life, pt wants to escape and expressed that "nature to hurry up and take him" and that "I feel like nothing" .    Jn expressed that she does not believe pt is in imminent danger of hurting himself. She stated that pt has not expressed any SI/HI or A/V/H. She stated that pt is sleeping however reports terrible dreams and after cant get back to sleep.  She reported that pt hygiene and eating is fair at this time.    She stated that recently she took pt to [facility] psych- pt was observed for one night. Once pt was discharge pt expressed to her that he is not that "sick as the other people there".  Jn stated that she help pt to get connected to a psychiatrist at [facility] ([place])- Pt has a PA: Dr. Dane Ruelas # [phone]- pt next appointment is 12/19/23 at 1230pm.    Jn stated that she found out that [facility] has a ar psych beds so this is another reason she brought pt here. Jn shared that her hope is for pt to be admitted and get the help he needs. Jn stated that pt use to be very outgoing, cheerful, and since all of this especially the break up pt changed.        Jn shared that pt has been diagnosis with depression for the past 30 years; she recalls this from her childhood. She stated that pt has been taking Paxil since then and it appears to have lost its effectiveness.  She state that the psychiatrist is weaning pt off the Paxil.  Pt is currently prescribed: Paxil 10mg (tappering off)  Lexapro 10mg, Rexulti 1mg and Ambien.    SW shared this information with provider.

## 2023-12-17 NOTE — ED BEHAVIORAL HEALTH ASSESSMENT NOTE - DETAILS
Pt endorses passive SI stating that he wants nature to take him. He denies intent or plan. mom had depression Sent email spoke with patient's daughter

## 2023-12-17 NOTE — H&P ADULT - ASSESSMENT
78 yr old male presenting with a voluntary admission for progressive depression symptoms. Being admitted to HD due to HD needs

## 2023-12-17 NOTE — H&P ADULT - NSHPADDITIONALINFOADULT_GEN_ALL_CORE
Medication list obtained from daughter    istop Reference #: [id number]  12/05/2023	12/05/2023	zolpidem tartrate 5 mg tablet	30	30	O'GillilandDane maurer	[id number]	Insurance Medication list obtained from daughter    istop Reference #: [id number]  12/05/2023	12/05/2023	zolpidem tartrate 5 mg tablet	30	30	O'GillilandDane maurer	[id number]	Insurance

## 2023-12-18 DIAGNOSIS — D64.9 ANEMIA, UNSPECIFIED: ICD-10-CM

## 2023-12-18 DIAGNOSIS — N18.6 END STAGE RENAL DISEASE: ICD-10-CM

## 2023-12-18 DIAGNOSIS — E87.5 HYPERKALEMIA: ICD-10-CM

## 2023-12-18 DIAGNOSIS — E83.39 OTHER DISORDERS OF PHOSPHORUS METABOLISM: ICD-10-CM

## 2023-12-18 LAB
ANION GAP SERPL CALC-SCNC: 20 MMOL/L — HIGH (ref 7–14)
ANION GAP SERPL CALC-SCNC: 20 MMOL/L — HIGH (ref 7–14)
BASOPHILS # BLD AUTO: 0.04 K/UL — SIGNIFICANT CHANGE UP (ref 0–0.2)
BASOPHILS # BLD AUTO: 0.04 K/UL — SIGNIFICANT CHANGE UP (ref 0–0.2)
BASOPHILS NFR BLD AUTO: 0.5 % — SIGNIFICANT CHANGE UP (ref 0–2)
BASOPHILS NFR BLD AUTO: 0.5 % — SIGNIFICANT CHANGE UP (ref 0–2)
BUN SERPL-MCNC: 91 MG/DL — HIGH (ref 7–23)
BUN SERPL-MCNC: 91 MG/DL — HIGH (ref 7–23)
CALCIUM SERPL-MCNC: 9.4 MG/DL — SIGNIFICANT CHANGE UP (ref 8.4–10.5)
CALCIUM SERPL-MCNC: 9.4 MG/DL — SIGNIFICANT CHANGE UP (ref 8.4–10.5)
CHLORIDE SERPL-SCNC: 99 MMOL/L — SIGNIFICANT CHANGE UP (ref 98–107)
CHLORIDE SERPL-SCNC: 99 MMOL/L — SIGNIFICANT CHANGE UP (ref 98–107)
CHOLEST SERPL-MCNC: 111 MG/DL — SIGNIFICANT CHANGE UP
CHOLEST SERPL-MCNC: 111 MG/DL — SIGNIFICANT CHANGE UP
CO2 SERPL-SCNC: 18 MMOL/L — LOW (ref 22–31)
CO2 SERPL-SCNC: 18 MMOL/L — LOW (ref 22–31)
CREAT SERPL-MCNC: 7.56 MG/DL — HIGH (ref 0.5–1.3)
CREAT SERPL-MCNC: 7.56 MG/DL — HIGH (ref 0.5–1.3)
DIALYSIS INSTRUMENT RESULT - HEPATITIS B SURFACE ANTIGEN: NEGATIVE — SIGNIFICANT CHANGE UP
DIALYSIS INSTRUMENT RESULT - HEPATITIS B SURFACE ANTIGEN: NEGATIVE — SIGNIFICANT CHANGE UP
EGFR: 7 ML/MIN/1.73M2 — LOW
EGFR: 7 ML/MIN/1.73M2 — LOW
EOSINOPHIL # BLD AUTO: 0.16 K/UL — SIGNIFICANT CHANGE UP (ref 0–0.5)
EOSINOPHIL # BLD AUTO: 0.16 K/UL — SIGNIFICANT CHANGE UP (ref 0–0.5)
EOSINOPHIL NFR BLD AUTO: 2.1 % — SIGNIFICANT CHANGE UP (ref 0–6)
EOSINOPHIL NFR BLD AUTO: 2.1 % — SIGNIFICANT CHANGE UP (ref 0–6)
GLUCOSE SERPL-MCNC: 93 MG/DL — SIGNIFICANT CHANGE UP (ref 70–99)
GLUCOSE SERPL-MCNC: 93 MG/DL — SIGNIFICANT CHANGE UP (ref 70–99)
HCT VFR BLD CALC: 29.5 % — LOW (ref 39–50)
HCT VFR BLD CALC: 29.5 % — LOW (ref 39–50)
HCV AB S/CO SERPL IA: 0.06 S/CO — SIGNIFICANT CHANGE UP (ref 0–0.99)
HCV AB S/CO SERPL IA: 0.06 S/CO — SIGNIFICANT CHANGE UP (ref 0–0.99)
HCV AB SERPL-IMP: SIGNIFICANT CHANGE UP
HCV AB SERPL-IMP: SIGNIFICANT CHANGE UP
HDLC SERPL-MCNC: 36 MG/DL — LOW
HDLC SERPL-MCNC: 36 MG/DL — LOW
HGB BLD-MCNC: 9.8 G/DL — LOW (ref 13–17)
HGB BLD-MCNC: 9.8 G/DL — LOW (ref 13–17)
IANC: 5.41 K/UL — SIGNIFICANT CHANGE UP (ref 1.8–7.4)
IANC: 5.41 K/UL — SIGNIFICANT CHANGE UP (ref 1.8–7.4)
IMM GRANULOCYTES NFR BLD AUTO: 0.4 % — SIGNIFICANT CHANGE UP (ref 0–0.9)
IMM GRANULOCYTES NFR BLD AUTO: 0.4 % — SIGNIFICANT CHANGE UP (ref 0–0.9)
LIPID PNL WITH DIRECT LDL SERPL: 46 MG/DL — SIGNIFICANT CHANGE UP
LIPID PNL WITH DIRECT LDL SERPL: 46 MG/DL — SIGNIFICANT CHANGE UP
LYMPHOCYTES # BLD AUTO: 1.36 K/UL — SIGNIFICANT CHANGE UP (ref 1–3.3)
LYMPHOCYTES # BLD AUTO: 1.36 K/UL — SIGNIFICANT CHANGE UP (ref 1–3.3)
LYMPHOCYTES # BLD AUTO: 17.8 % — SIGNIFICANT CHANGE UP (ref 13–44)
LYMPHOCYTES # BLD AUTO: 17.8 % — SIGNIFICANT CHANGE UP (ref 13–44)
MAGNESIUM SERPL-MCNC: 2.7 MG/DL — HIGH (ref 1.6–2.6)
MAGNESIUM SERPL-MCNC: 2.7 MG/DL — HIGH (ref 1.6–2.6)
MCHC RBC-ENTMCNC: 33.2 GM/DL — SIGNIFICANT CHANGE UP (ref 32–36)
MCHC RBC-ENTMCNC: 33.2 GM/DL — SIGNIFICANT CHANGE UP (ref 32–36)
MCHC RBC-ENTMCNC: 34.3 PG — HIGH (ref 27–34)
MCHC RBC-ENTMCNC: 34.3 PG — HIGH (ref 27–34)
MCV RBC AUTO: 103.1 FL — HIGH (ref 80–100)
MCV RBC AUTO: 103.1 FL — HIGH (ref 80–100)
MONOCYTES # BLD AUTO: 0.65 K/UL — SIGNIFICANT CHANGE UP (ref 0–0.9)
MONOCYTES # BLD AUTO: 0.65 K/UL — SIGNIFICANT CHANGE UP (ref 0–0.9)
MONOCYTES NFR BLD AUTO: 8.5 % — SIGNIFICANT CHANGE UP (ref 2–14)
MONOCYTES NFR BLD AUTO: 8.5 % — SIGNIFICANT CHANGE UP (ref 2–14)
NEUTROPHILS # BLD AUTO: 5.41 K/UL — SIGNIFICANT CHANGE UP (ref 1.8–7.4)
NEUTROPHILS # BLD AUTO: 5.41 K/UL — SIGNIFICANT CHANGE UP (ref 1.8–7.4)
NEUTROPHILS NFR BLD AUTO: 70.7 % — SIGNIFICANT CHANGE UP (ref 43–77)
NEUTROPHILS NFR BLD AUTO: 70.7 % — SIGNIFICANT CHANGE UP (ref 43–77)
NON HDL CHOLESTEROL: 75 MG/DL — SIGNIFICANT CHANGE UP
NON HDL CHOLESTEROL: 75 MG/DL — SIGNIFICANT CHANGE UP
NRBC # BLD: 0 /100 WBCS — SIGNIFICANT CHANGE UP (ref 0–0)
NRBC # BLD: 0 /100 WBCS — SIGNIFICANT CHANGE UP (ref 0–0)
NRBC # FLD: 0 K/UL — SIGNIFICANT CHANGE UP (ref 0–0)
NRBC # FLD: 0 K/UL — SIGNIFICANT CHANGE UP (ref 0–0)
PHOSPHATE SERPL-MCNC: 7.4 MG/DL — HIGH (ref 2.5–4.5)
PHOSPHATE SERPL-MCNC: 7.4 MG/DL — HIGH (ref 2.5–4.5)
PLATELET # BLD AUTO: 402 K/UL — HIGH (ref 150–400)
PLATELET # BLD AUTO: 402 K/UL — HIGH (ref 150–400)
POTASSIUM SERPL-MCNC: 5.3 MMOL/L — SIGNIFICANT CHANGE UP (ref 3.5–5.3)
POTASSIUM SERPL-MCNC: 5.3 MMOL/L — SIGNIFICANT CHANGE UP (ref 3.5–5.3)
POTASSIUM SERPL-SCNC: 5.3 MMOL/L — SIGNIFICANT CHANGE UP (ref 3.5–5.3)
POTASSIUM SERPL-SCNC: 5.3 MMOL/L — SIGNIFICANT CHANGE UP (ref 3.5–5.3)
RBC # BLD: 2.86 M/UL — LOW (ref 4.2–5.8)
RBC # BLD: 2.86 M/UL — LOW (ref 4.2–5.8)
RBC # FLD: 12.1 % — SIGNIFICANT CHANGE UP (ref 10.3–14.5)
RBC # FLD: 12.1 % — SIGNIFICANT CHANGE UP (ref 10.3–14.5)
SODIUM SERPL-SCNC: 137 MMOL/L — SIGNIFICANT CHANGE UP (ref 135–145)
SODIUM SERPL-SCNC: 137 MMOL/L — SIGNIFICANT CHANGE UP (ref 135–145)
TRIGL SERPL-MCNC: 143 MG/DL — SIGNIFICANT CHANGE UP
TRIGL SERPL-MCNC: 143 MG/DL — SIGNIFICANT CHANGE UP
WBC # BLD: 7.65 K/UL — SIGNIFICANT CHANGE UP (ref 3.8–10.5)
WBC # BLD: 7.65 K/UL — SIGNIFICANT CHANGE UP (ref 3.8–10.5)
WBC # FLD AUTO: 7.65 K/UL — SIGNIFICANT CHANGE UP (ref 3.8–10.5)
WBC # FLD AUTO: 7.65 K/UL — SIGNIFICANT CHANGE UP (ref 3.8–10.5)

## 2023-12-18 PROCEDURE — 99232 SBSQ HOSP IP/OBS MODERATE 35: CPT

## 2023-12-18 PROCEDURE — 99223 1ST HOSP IP/OBS HIGH 75: CPT | Mod: GC

## 2023-12-18 RX ORDER — HALOPERIDOL DECANOATE 100 MG/ML
2.5 INJECTION INTRAMUSCULAR EVERY 6 HOURS
Refills: 0 | Status: DISCONTINUED | OUTPATIENT
Start: 2023-12-18 | End: 2023-12-21

## 2023-12-18 RX ORDER — ERYTHROPOIETIN 10000 [IU]/ML
4000 INJECTION, SOLUTION INTRAVENOUS; SUBCUTANEOUS
Refills: 0 | Status: DISCONTINUED | OUTPATIENT
Start: 2023-12-18 | End: 2023-12-21

## 2023-12-18 RX ORDER — SODIUM CHLORIDE 9 MG/ML
100 INJECTION INTRAMUSCULAR; INTRAVENOUS; SUBCUTANEOUS
Refills: 0 | Status: DISCONTINUED | OUTPATIENT
Start: 2023-12-18 | End: 2023-12-21

## 2023-12-18 RX ORDER — ESCITALOPRAM OXALATE 10 MG/1
10 TABLET, FILM COATED ORAL DAILY
Refills: 0 | Status: DISCONTINUED | OUTPATIENT
Start: 2023-12-18 | End: 2023-12-19

## 2023-12-18 RX ORDER — SODIUM ZIRCONIUM CYCLOSILICATE 10 G/10G
10 POWDER, FOR SUSPENSION ORAL
Refills: 0 | Status: DISCONTINUED | OUTPATIENT
Start: 2023-12-18 | End: 2023-12-21

## 2023-12-18 RX ORDER — SEVELAMER CARBONATE 2400 MG/1
1600 POWDER, FOR SUSPENSION ORAL
Refills: 0 | Status: DISCONTINUED | OUTPATIENT
Start: 2023-12-18 | End: 2023-12-21

## 2023-12-18 RX ORDER — HEPARIN SODIUM 5000 [USP'U]/ML
1000 INJECTION INTRAVENOUS; SUBCUTANEOUS ONCE
Refills: 0 | Status: COMPLETED | OUTPATIENT
Start: 2023-12-18 | End: 2023-12-18

## 2023-12-18 RX ORDER — INFLUENZA VIRUS VACCINE 15; 15; 15; 15 UG/.5ML; UG/.5ML; UG/.5ML; UG/.5ML
0.7 SUSPENSION INTRAMUSCULAR ONCE
Refills: 0 | Status: DISCONTINUED | OUTPATIENT
Start: 2023-12-18 | End: 2023-12-21

## 2023-12-18 RX ADMIN — SEVELAMER CARBONATE 1600 MILLIGRAM(S): 2400 POWDER, FOR SUSPENSION ORAL at 17:23

## 2023-12-18 RX ADMIN — FINASTERIDE 5 MILLIGRAM(S): 5 TABLET, FILM COATED ORAL at 12:23

## 2023-12-18 RX ADMIN — ERYTHROPOIETIN 4000 UNIT(S): 10000 INJECTION, SOLUTION INTRAVENOUS; SUBCUTANEOUS at 22:28

## 2023-12-18 RX ADMIN — HEPARIN SODIUM 5000 UNIT(S): 5000 INJECTION INTRAVENOUS; SUBCUTANEOUS at 05:40

## 2023-12-18 RX ADMIN — ESCITALOPRAM OXALATE 10 MILLIGRAM(S): 10 TABLET, FILM COATED ORAL at 12:23

## 2023-12-18 RX ADMIN — HEPARIN SODIUM 1000 UNIT(S): 5000 INJECTION INTRAVENOUS; SUBCUTANEOUS at 21:00

## 2023-12-18 RX ADMIN — Medication 175 MICROGRAM(S): at 05:40

## 2023-12-18 RX ADMIN — Medication 81 MILLIGRAM(S): at 12:23

## 2023-12-18 RX ADMIN — Medication 1 TABLET(S): at 12:23

## 2023-12-18 NOTE — BH CONSULTATION LIAISON ASSESSMENT NOTE - NSICDXBHSECONDARYDX_PSY_ALL_CORE
ESRD on dialysis   N18.6  Acquired hypothyroidism   E03.9  Elevated blood pressure reading without diagnosis of hypertension   R03.0  HLD (hyperlipidemia)   E78.5  ESRD on hemodialysis   N18.6  Anemia   D64.9  Hyperphosphatemia   E83.39  Hyperkalemia   E87.5  Depression, major, recurrent, moderate   F33.1

## 2023-12-18 NOTE — CONSULT NOTE ADULT - PROBLEM SELECTOR RECOMMENDATION 9
Pt. with ESRD on HD TIW (MWF schedule) via LUE AVF at [facility] in [place]. Last outpatient HD treatment was on 12/15/23. Pt. is euvolemic on exam. Pt. clinically stable. Labs reviewed. Plan for maintenance HD treatment today. HD consent obtained and placed in pt's chart. Monitor BP and labs. Avoid nephrotoxins. Dose medications to ESRD/HD. Pt. with ESRD on HD TIW (MWF schedule) via LUE AVF at [facility] in [place]. Last outpatient HD treatment was on 12/15/23. Pt. is euvolemic on exam. Pt. clinically stable. Labs reviewed. Plan for maintenance HD treatment today. HD consent obtained and placed in pt's chart. Monitor BP and labs. Avoid nephrotoxins. Dose medications to ESRD/HD.

## 2023-12-18 NOTE — BH CONSULTATION LIAISON ASSESSMENT NOTE - SUMMARY
Pt is a 77 y/o male, , domiciled at [facility], with pphx of depression, no prior psychiatric hospitalizations but was evaluated at [facility] a few weeks ago and stayed overnight, no hx of SA, no hx of NSSIB, no hx of violence or arrests, no active substance use, w/ pmh significant for hx of lung cancer, hx of thyroid cancer, ESRD on hemodialysis Monday Wednesday Friday, and high cholesterol was admitted for depression     On assessment, patient endorses worsening depressive symptoms over the past 3-4 months including depressed mood, insomnia, anhedonia, guilt, low energy, poor concentration, and suicidal ideation. He reports passive suicidal ideation, . Collateral from patient's daughter was obtained in the ED is significant for recent breakup with patient's longtime partner who he used to live with.     Plan:   - Resume home medications   -- Lexapro 10mg daily   -- Paxil 10mg daily      Pt is a 79 y/o male, , domiciled at [facility], with pphx of depression, no prior psychiatric hospitalizations but was evaluated at [facility] a few weeks ago and stayed overnight, no hx of SA, no hx of NSSIB, no hx of violence or arrests, no active substance use, w/ pmh significant for hx of lung cancer, hx of thyroid cancer, ESRD on hemodialysis Monday Wednesday Friday, and high cholesterol was admitted for depression     On assessment, patient endorses worsening depressive symptoms over the past 3-4 months including depressed mood, insomnia, anhedonia, guilt, low energy, poor concentration, and suicidal ideation. He reports passive suicidal ideation, . Collateral from patient's daughter was obtained in the ED is significant for recent breakup with patient's longtime partner who he used to live with.     Plan:   - Resume home medications   -- Lexapro 10mg daily   -- Paxil 10mg daily      Pt is a 79 y/o male, , domiciled at [facility], with pphx of depression, no prior psychiatric hospitalizations but was evaluated at [facility] a few weeks ago and stayed overnight, no hx of SA, no hx of NSSIB, no hx of violence or arrests, no active substance use, w/ pmh significant for hx of lung cancer, hx of thyroid cancer, ESRD on hemodialysis Monday Wednesday Friday, and high cholesterol was admitted for depression     On assessment, patient endorses worsening depressive symptoms over the past 3-4 months including depressed mood, insomnia, anhedonia, guilt, low energy, poor concentration, and suicidal ideation. He reports passive suicidal ideation, . Collateral from patient's daughter was obtained in the ED is significant for recent breakup with patient's longtime partner who he used to live with.     Plan:   - Resume home medications   -- Lexapro 10mg daily   -- Paxil 10mg daily; tomorrow lower paxil to 5mg daily     Pt is a 77 y/o male, , domiciled at [facility], with pphx of depression, no prior psychiatric hospitalizations but was evaluated at [facility] a few weeks ago and stayed overnight, no hx of SA, no hx of NSSIB, no hx of violence or arrests, no active substance use, w/ pmh significant for hx of lung cancer, hx of thyroid cancer, ESRD on hemodialysis Monday Wednesday Friday, and high cholesterol was admitted for depression     On assessment, patient endorses worsening depressive symptoms over the past 3-4 months including depressed mood, insomnia, anhedonia, guilt, low energy, poor concentration, and suicidal ideation. He reports passive suicidal ideation, . Collateral from patient's daughter was obtained in the ED is significant for recent breakup with patient's longtime partner who he used to live with.     Plan:   - Resume home medications   -- Lexapro 10mg daily   -- Paxil 10mg daily; tomorrow lower paxil to 5mg daily

## 2023-12-18 NOTE — PROGRESS NOTE ADULT - SUBJECTIVE AND OBJECTIVE BOX
Hospitalist Progress Note  Olimpia Rodriguez MD Pager # [phone]    OVERNIGHT EVENTS: LOTUS    SUBJECTIVE / INTERVAL HPI: Patient seen and examined at bedside. Pt reports he wants to get better. He has been depressed for a few months. He reports no constipation and had a BM today. No cold intolerance. No CP or SOB. No other symptoms.     VITAL SIGNS:  Vital Signs Last 24 Hrs  T(C): 36.6 (18 Dec 2023 12:27), Max: 36.7 (17 Dec 2023 18:20)  T(F): 97.9 (18 Dec 2023 12:27), Max: 98.1 (17 Dec 2023 18:20)  HR: 82 (18 Dec 2023 12:27) (82 - 89)  BP: 162/62 (18 Dec 2023 12:27) (154/72 - 162/72)  BP(mean): --  RR: 17 (18 Dec 2023 12:27) (17 - 18)  SpO2: 99% (18 Dec 2023 12:27) (97% - 99%)    Parameters below as of 18 Dec 2023 12:27  Patient On (Oxygen Delivery Method): room air        PHYSICAL EXAM:    General: Comfortable   HEENT: NC/AT; PERRL, anicteric sclera; MMM  Neck: supple  Cardiovascular: +S1/S2; RRR  Respiratory: CTA B/L; no W/R/R  Gastrointestinal: soft, NT/ND; +BSx4  Extremities: WWP; no edema, clubbing or cyanosis  Vascular: 2+ radial, DP/PT pulses B/L  Neurological: AAOx3; no focal deficits    MEDICATIONS:  MEDICATIONS  (STANDING):  aspirin  chewable 81 milliGRAM(s) Oral daily  atorvastatin 40 milliGRAM(s) Oral at bedtime  chlorhexidine 2% Cloths 1 Application(s) Topical daily  epoetin evan (EPOGEN) Injectable 4000 Unit(s) IV Push <User Schedule>  escitalopram 10 milliGRAM(s) Oral daily  finasteride 5 milliGRAM(s) Oral daily  heparin   Injectable 5000 Unit(s) SubCutaneous every 8 hours  levothyroxine 175 MICROGram(s) Oral daily  multivitamin 1 Tablet(s) Oral daily  sevelamer carbonate 1600 milliGRAM(s) Oral three times a day with meals  sodium zirconium cyclosilicate 10 Gram(s) Oral <User Schedule>    MEDICATIONS  (PRN):  acetaminophen     Tablet .. 650 milliGRAM(s) Oral every 6 hours PRN Temp greater or equal to 38C (100.4F), Mild Pain (1 - 3)  aluminum hydroxide/magnesium hydroxide/simethicone Suspension 30 milliLiter(s) Oral every 4 hours PRN Dyspepsia  melatonin 3 milliGRAM(s) Oral at bedtime PRN Insomnia  ondansetron Injectable 4 milliGRAM(s) IV Push every 8 hours PRN Nausea and/or Vomiting  sodium chloride 0.9% Bolus. 100 milliLiter(s) IV Bolus every 5 minutes PRN SBP LESS THAN or EQUAL to 90 mmHg  zolpidem 5 milliGRAM(s) Oral at bedtime PRN Insomnia      ALLERGIES:  Allergies    No Known Allergies    Intolerances        LABS:                        9.8    7.65  )-----------( 402      ( 18 Dec 2023 05:41 )             29.5     12-18    137  |  99  |  91<H>  ----------------------------<  93  5.3   |  18<L>  |  7.56<H>    Ca    9.4      18 Dec 2023 05:41  Phos  7.4     12-18  Mg     2.70     12-18    TPro  6.6  /  Alb  3.9  /  TBili  0.3  /  DBili  x   /  AST  9   /  ALT  12  /  AlkPhos  57  12-17      Urinalysis Basic - ( 18 Dec 2023 05:41 )    Color: x / Appearance: x / SG: x / pH: x  Gluc: 93 mg/dL / Ketone: x  / Bili: x / Urobili: x   Blood: x / Protein: x / Nitrite: x   Leuk Esterase: x / RBC: x / WBC x   Sq Epi: x / Non Sq Epi: x / Bacteria: x      CAPILLARY BLOOD GLUCOSE          RADIOLOGY & ADDITIONAL TESTS: Reviewed.    ASSESSMENT:    PLAN:  Hospitalist Progress Note  Olimpia Rodriguez MD Pager # [phone]    OVERNIGHT EVENTS: LOTUS    SUBJECTIVE / INTERVAL HPI: Patient seen and examined at bedside. Pt reports he wants to get better. He has been depressed for a few months. He reports no constipation and had a BM today. No cold intolerance. No CP or SOB. No other symptoms.     VITAL SIGNS:  Vital Signs Last 24 Hrs  T(C): 36.6 (18 Dec 2023 12:27), Max: 36.7 (17 Dec 2023 18:20)  T(F): 97.9 (18 Dec 2023 12:27), Max: 98.1 (17 Dec 2023 18:20)  HR: 82 (18 Dec 2023 12:27) (82 - 89)  BP: 162/62 (18 Dec 2023 12:27) (154/72 - 162/72)  BP(mean): --  RR: 17 (18 Dec 2023 12:27) (17 - 18)  SpO2: 99% (18 Dec 2023 12:27) (97% - 99%)    Parameters below as of 18 Dec 2023 12:27  Patient On (Oxygen Delivery Method): room air        PHYSICAL EXAM:    General: Comfortable   HEENT: NC/AT; PERRL, anicteric sclera; MMM  Neck: supple  Cardiovascular: +S1/S2; RRR  Respiratory: CTA B/L; no W/R/R  Gastrointestinal: soft, NT/ND; +BSx4  Extremities: WWP; no edema, clubbing or cyanosis  Vascular: 2+ radial, DP/PT pulses B/L  Neurological: AAOx3; no focal deficits    MEDICATIONS:  MEDICATIONS  (STANDING):  aspirin  chewable 81 milliGRAM(s) Oral daily  atorvastatin 40 milliGRAM(s) Oral at bedtime  chlorhexidine 2% Cloths 1 Application(s) Topical daily  epoetin evan (EPOGEN) Injectable 4000 Unit(s) IV Push <User Schedule>  escitalopram 10 milliGRAM(s) Oral daily  finasteride 5 milliGRAM(s) Oral daily  heparin   Injectable 5000 Unit(s) SubCutaneous every 8 hours  levothyroxine 175 MICROGram(s) Oral daily  multivitamin 1 Tablet(s) Oral daily  sevelamer carbonate 1600 milliGRAM(s) Oral three times a day with meals  sodium zirconium cyclosilicate 10 Gram(s) Oral <User Schedule>    MEDICATIONS  (PRN):  acetaminophen     Tablet .. 650 milliGRAM(s) Oral every 6 hours PRN Temp greater or equal to 38C (100.4F), Mild Pain (1 - 3)  aluminum hydroxide/magnesium hydroxide/simethicone Suspension 30 milliLiter(s) Oral every 4 hours PRN Dyspepsia  melatonin 3 milliGRAM(s) Oral at bedtime PRN Insomnia  ondansetron Injectable 4 milliGRAM(s) IV Push every 8 hours PRN Nausea and/or Vomiting  sodium chloride 0.9% Bolus. 100 milliLiter(s) IV Bolus every 5 minutes PRN SBP LESS THAN or EQUAL to 90 mmHg  zolpidem 5 milliGRAM(s) Oral at bedtime PRN Insomnia      ALLERGIES:  Allergies    No Known Allergies    Intolerances        LABS:                        9.8    7.65  )-----------( 402      ( 18 Dec 2023 05:41 )             29.5     12-18    137  |  99  |  91<H>  ----------------------------<  93  5.3   |  18<L>  |  7.56<H>    Ca    9.4      18 Dec 2023 05:41  Phos  7.4     12-18  Mg     2.70     12-18    TPro  6.6  /  Alb  3.9  /  TBili  0.3  /  DBili  x   /  AST  9   /  ALT  12  /  AlkPhos  57  12-17      Urinalysis Basic - ( 18 Dec 2023 05:41 )    Color: x / Appearance: x / SG: x / pH: x  Gluc: 93 mg/dL / Ketone: x  / Bili: x / Urobili: x   Blood: x / Protein: x / Nitrite: x   Leuk Esterase: x / RBC: x / WBC x   Sq Epi: x / Non Sq Epi: x / Bacteria: x      CAPILLARY BLOOD GLUCOSE          RADIOLOGY & ADDITIONAL TESTS: Reviewed.    ASSESSMENT:    PLAN:

## 2023-12-18 NOTE — CONSULT NOTE ADULT - PROBLEM SELECTOR RECOMMENDATION 3
Pt. with hyperphosphatemia in the setting of ESRD. Serum phosphorus (7.4) is above target range. Pt. takes PO Velphoro. Recommend to start sevelamer 2 tabs tid w/ meals. Monitor serum phosphorus, goal: 3.5-5.5.

## 2023-12-18 NOTE — CONSULT NOTE ADULT - PROBLEM SELECTOR RECOMMENDATION 4
Pt. with h/o hyperkalemia, takes PO Valtessa 8.4 gm on non-HD days. Serum potassium is 5.3 today. Plan for HD, as above. Recommend Lokelma 10 gm on non-HD days. Monitor serum potassium.    If you have any questions, please feel free to contact me  Cecy Velasco  Nephrology Fellow  [phone] / [employer] Teams(Preferred)  (After 5pm or on weekends please page the on-call fellow) Pt. with h/o hyperkalemia, takes PO Valtessa 8.4 gm on non-HD days. Serum potassium is 5.3 today. Plan for HD, as above. Recommend Lokelma 10 gm on non-HD days. Monitor serum potassium.    If you have any questions, please feel free to contact me  Cecy Velasco  Nephrology Fellow  [phone] / [employer] Teams(Preferred)  (After 5pm or on weekends please page the on-call fellow)

## 2023-12-18 NOTE — BH CONSULTATION LIAISON ASSESSMENT NOTE - RISK ASSESSMENT
Risk factors: +current passive SI, chronic medical conditions, recent breakup    Protective factors: no current HIIP, no h/o SA/SIB, no h/o psych admissions, no access to weapons, no active substance abuse, domiciled, social supports, engaged in treatment, compliant with treatment, help-seeking behaviors

## 2023-12-18 NOTE — PATIENT PROFILE ADULT - OVER THE PAST TWO WEEKS, HAVE YOU FELT LITTLE INTEREST OR PLEASURE IN DOING THINGS?
ASSESSMENT  URI/ cough/ pharyngitis -- viral illness    PLAN  -If having fevers then keep them down with over the counter analgesics if tolerated.     --May use OTC cold medication such as  Use mucinex dm as needed for daytime cough/ congestion  if tolerated.      --Increase fluid intake-- popsicles, clear liquids.    --Rest - use vaporizer if congested.    --If sore throat--- can gargle with warm water.      --Medication prescribed : Use promethazine dm as directed prescribed if needed for night time cough as it causes sedation. No driving or operating heavy machinery on medication.    Side effects of the medications recommended or prescribed discussed during the visit.     Patient is advised of the self-limiting nature of this viral illness.  Expect improvement of fevers if any in 2-3 days and a gradual improvement of other symptoms over 5-7 days.  If persisting fevers greater than 3-4 days or symptoms that are lingering inspite of symptomatic care then patient should be recheck at walk in care or with your doctor.      But if develop new symptoms of concern develop such new fevers / reoccurrence of fever during an existing illness, severe headaches, wheezing, new pains etc  then must seek medical attention immediately with your doctor, walk in care or ER.     no

## 2023-12-18 NOTE — CONSULT NOTE ADULT - ATTENDING COMMENTS
ESRD on HD MWF  hyperkalemia, Hyperphosphatemia, Anemia management and 5ecommecenadtyions as outlined above   dose meds per eGFR

## 2023-12-18 NOTE — CONSULT NOTE ADULT - PROBLEM SELECTOR RECOMMENDATION 2
Pt. with anemia in the setting of ESRD, receives EPO at outpatient HD center. Hgb (9.8) is slightly below goal for ESRD. Will order HIEU. Check iron panel including ferritin. Monitor hgb, goal: 10-11.

## 2023-12-18 NOTE — BH CONSULTATION LIAISON ASSESSMENT NOTE - NSBHCHARTREVIEWVS_PSY_A_CORE FT
Vital Signs Last 24 Hrs  T(C): 36.7 (18 Dec 2023 05:40), Max: 36.8 (17 Dec 2023 11:55)  T(F): 98 (18 Dec 2023 05:40), Max: 98.2 (17 Dec 2023 11:55)  HR: 86 (18 Dec 2023 05:40) (86 - 90)  BP: 158/75 (18 Dec 2023 05:40) (154/68 - 165/75)  BP(mean): --  RR: 18 (18 Dec 2023 05:40) (16 - 18)  SpO2: 98% (18 Dec 2023 05:40) (97% - 100%)    Parameters below as of 18 Dec 2023 05:40  Patient On (Oxygen Delivery Method): room air

## 2023-12-18 NOTE — PATIENT PROFILE ADULT - FALL HARM RISK - UNIVERSAL INTERVENTIONS
Bed in lowest position, wheels locked, appropriate side rails in place/Call bell, personal items and telephone in reach/Instruct patient to call for assistance before getting out of bed or chair/Non-slip footwear when patient is out of bed/[place] to call system/Physically safe environment - no spills, clutter or unnecessary equipment/Purposeful Proactive Rounding/Room/bathroom lighting operational, light cord in reach Bed in lowest position, wheels locked, appropriate side rails in place/Call bell, personal items and telephone in reach/Instruct patient to call for assistance before getting out of bed or chair/Non-slip footwear when patient is out of bed/[place] to call system/Physically safe environment - no spills, clutter or unnecessary equipment/Purposeful Proactive Rounding/Room/bathroom lighting operational, light cord in reach

## 2023-12-18 NOTE — BH CONSULTATION LIAISON ASSESSMENT NOTE - NSBHATTESTBILLING_PSY_A_CORE
92186-Hkujadvfrt OBS or IP - moderate complexity OR 35-49 mins 91071-Mdkicaaouh OBS or IP - moderate complexity OR 35-49 mins

## 2023-12-18 NOTE — BH CONSULTATION LIAISON ASSESSMENT NOTE - NSACTIVEVENT_PSY_ALL_CORE
Triggering events leading to humiliation, shame, and/or despair (e.g., Loss of relationship, financial or health status) (real or anticipated) Triggering events leading to humiliation, shame, and/or despair (e.g., Loss of relationship, financial or health status) (real or anticipated)/Perceived burden on family or others

## 2023-12-18 NOTE — PATIENT PROFILE ADULT - NSPRESCRALCFREQ_GEN_A_NUR
Detail Level: Detailed Quality 131: Pain Assessment And Follow-Up: Pain assessment using a standardized tool is documented as negative, no follow-up plan required Quality 110: Preventive Care And Screening: Influenza Immunization: Influenza Immunization previously received during influenza season Quality 137: Melanoma: Continuity Of Care - Recall System: Patient information entered into a recall system that includes: target date for the next exam specified AND a process to follow up with patients regarding missed or unscheduled appointments Quality 226: Preventive Care And Screening: Tobacco Use: Screening And Cessation Intervention: Patient screened for tobacco use and is an ex/non-smoker Quality 130: Documentation Of Current Medications In The Medical Record: Current Medications Documented Never

## 2023-12-18 NOTE — BH CONSULTATION LIAISON ASSESSMENT NOTE - HPI (INCLUDE ILLNESS QUALITY, SEVERITY, DURATION, TIMING, CONTEXT, MODIFYING FACTORS, ASSOCIATED SIGNS AND SYMPTOMS)
Mr. Cobian is a 77 y/o male, , domiciled at [facility], with pphx of depression, no prior psychiatric hospitalizations but was evaluated at [facility] a few weeks ago and stayed overnight, no hx of SA, no hx of NSSIB, no hx of violence or arrests, no active substance use, w/ pmh significant for hx of lung cancer, hx of thyroid cancer, ESRD on hemodialysis Monday Wednesday Friday, and high cholesterol, was admitted after patient expressed severe depressive symptoms.     On interview, patient was calm and cooperative, forthcoming . Patient reports that he has been very depressed for the past 3-4 months. He states that he has multiple medical problems and has had traumatic events over the past few months. Started with his long term GF left him and then he was started on hemodialysis, patient stated that after all these he was depressed and that thoughts of suicide. As per patient he had no plans to it but these thoughts crossed his mind. He states that he was also depressed 30 years ago and at that time was started on Paxil as well as started therapy. He has been taking Paxil for the past 30 years. He sees psychiatrist Dr. Dane Ruelas who is now switching him from Paxil to Lexapro. He endorses symptoms of depression including depressed mood, insomnia, anhedonia, guilt, low energy, poor concentration, and passive suicidal ideation. He denies changes in appetite. He denies symptoms of lux including elated mood, distractibility, increase in goal directed activities, flight of ideas, and grandiosity. He denies symptoms of psychosis including AVH, paranoia.   Patient is interested in medication adjustments that will likely to be done in inpatient setting. Patient feels safe in the hospital and has no active plans to hurt himself.    Mr. Cobian is a 77 y/o male, , domiciled at [facility], with pphx of depression, no prior psychiatric hospitalizations but was evaluated at [facility] a few weeks ago and stayed overnight, no hx of SA, no hx of NSSIB, no hx of violence or arrests, no active substance use, w/ pmh significant for hx of lung cancer, hx of thyroid cancer, ESRD on hemodialysis Monday Wednesday Friday, and high cholesterol, was admitted after patient expressed severe depressive symptoms.     On interview, patient was calm and cooperative, forthcoming . Patient reports that he has been very depressed for the past 3-4 months. He states that he has multiple medical problems and has had traumatic events over the past few months. Started with his long term GF left him and then he was started on hemodialysis, patient stated that after all these he was depressed and that thoughts of suicide. As per patient he had no plans to it but these thoughts crossed his mind. He states that he was also depressed 30 years ago and at that time was started on Paxil as well as started therapy. He has been taking Paxil for the past 30 years. He sees psychiatrist Dr. Dane Ruelas who is now switching him from Paxil to Lexapro. He endorses symptoms of depression including depressed mood, insomnia, anhedonia, guilt, low energy, poor concentration, and passive suicidal ideation. He denies changes in appetite. He denies symptoms of lux including elated mood, distractibility, increase in goal directed activities, flight of ideas, and grandiosity. He denies symptoms of psychosis including AVH, paranoia.   Patient is interested in medication adjustments that will likely to be done in inpatient setting. Patient feels safe in the hospital and has no active plans to hurt himself.    THIS NOTE WAS MEANT TO BE A PROGRESS NOTE    Mr. Cobian is a 79 y/o male, , domiciled at [facility], with pphx of depression, no prior psychiatric hospitalizations but was evaluated at [facility] a few weeks ago and stayed overnight, no hx of SA, no hx of NSSIB, no hx of violence or arrests, no active substance use, w/ pmh significant for hx of lung cancer, hx of thyroid cancer, ESRD on hemodialysis Monday Wednesday Friday, and high cholesterol, was admitted after patient expressed severe depressive symptoms.     On interview, patient was calm and cooperative, forthcoming . Patient reports that he has been very depressed for the past 3-4 months. He states that he has multiple medical problems and has had traumatic events over the past few months. Started with his long term GF left him and then he was started on hemodialysis, patient stated that after all these he was depressed and that thoughts of suicide. As per patient he had no plans to it but these thoughts crossed his mind. He states that he was also depressed 30 years ago and at that time was started on Paxil as well as started therapy. He has been taking Paxil for the past 30 years. He sees psychiatrist Dr. Dane Ruelas who is now switching him from Paxil to Lexapro. He endorses symptoms of depression including depressed mood, insomnia, anhedonia, guilt, low energy, poor concentration, and passive suicidal ideation. He denies changes in appetite. He denies symptoms of lux including elated mood, distractibility, increase in goal directed activities, flight of ideas, and grandiosity. He denies symptoms of psychosis including AVH, paranoia.   Patient is interested in medication adjustments that will likely to be done in inpatient setting. Patient feels safe in the hospital and has no active plans to hurt himself.    THIS NOTE WAS MEANT TO BE A PROGRESS NOTE    Mr. Cobian is a 77 y/o male, , domiciled at [facility], with pphx of depression, no prior psychiatric hospitalizations but was evaluated at [facility] a few weeks ago and stayed overnight, no hx of SA, no hx of NSSIB, no hx of violence or arrests, no active substance use, w/ pmh significant for hx of lung cancer, hx of thyroid cancer, ESRD on hemodialysis Monday Wednesday Friday, and high cholesterol, was admitted after patient expressed severe depressive symptoms.     On interview, patient was calm and cooperative, forthcoming . Patient reports that he has been very depressed for the past 3-4 months. He states that he has multiple medical problems and has had traumatic events over the past few months. Started with his long term GF left him and then he was started on hemodialysis, patient stated that after all these he was depressed and that thoughts of suicide. As per patient he had no plans to it but these thoughts crossed his mind. He states that he was also depressed 30 years ago and at that time was started on Paxil as well as started therapy. He has been taking Paxil for the past 30 years. He sees psychiatrist Dr. Dane Ruelas who is now switching him from Paxil to Lexapro. He endorses symptoms of depression including depressed mood, insomnia, anhedonia, guilt, low energy, poor concentration, and passive suicidal ideation. He denies changes in appetite. He denies symptoms of lux including elated mood, distractibility, increase in goal directed activities, flight of ideas, and grandiosity. He denies symptoms of psychosis including AVH, paranoia.   Patient is interested in medication adjustments that will likely to be done in inpatient setting. Patient feels safe in the hospital and has no active plans to hurt himself.

## 2023-12-18 NOTE — BH CONSULTATION LIAISON ASSESSMENT NOTE - NSBHATTESTCOMMENTATTENDFT_PSY_A_CORE
Chart reviewed. I agree with Dr. Gaston's history, MSE, A/P.   In brief, patient admits to feeling more depressed of late with passive SI without intent or plan. He denies AH/VH. Is seeking inpatient voluntary admission but understands that he needs a unit that can accommodate HD.  Affect is dysthymic. Thought process is linear.    Will continue taper of paxil while admitted and waiting for inpatient psych transfer.  [] tomorrow lower paxil to 5mg daily  [] continue with lexapro as is  otherwise plan per above

## 2023-12-18 NOTE — CONSULT NOTE ADULT - SUBJECTIVE AND OBJECTIVE BOX
[facility] DIVISION OF KIDNEY DISEASES AND HYPERTENSION -- [phone]  -- INITIAL CONSULT NOTE  --------------------------------------------------------------------------------  HPI: 79 yo M with h/o ESRD on HD MWF, lung cancer, thyroid cancer, HTN HLD, MDD/anxiety, and BPH currently admitted for worsening depression. Nephrology was consulted for ESRD/HD management.     Pt. was seen and evaluated in the ER earlier today. Pt. reports initiating HD ~6 months ago. Per pt, etiology of ESRD is HTN. Pt. receives HD treatments at [facility] in [place] via LUE AVF. Pt. is unsure of the name of his outpatient nephrologist. Last outpatient HD treatment was on 12/15/23. Dry weight is 91 kg and pt. receives 1-2L UF with each treatment. Per pt, still produces large volume of urine. Pt. receives HIEU with HD. Also taking valtessa, and phosphorus binders. Pt. reports depression, otherwise feels well. Denies any headaches, fevers/chills, chest pain, SOB, and LE edema.      PAST HISTORY  --------------------------------------------------------------------------------  PAST MEDICAL & SURGICAL HISTORY:  ESRD on dialysis  Thyroid cancer  Lung cancer  HLD (hyperlipidemia)  Anxiety and depression  Acquired hypothyroidism    FAMILY HISTORY:  No pertinent family history in first degree relatives      PAST SOCIAL HISTORY:    ALLERGIES & MEDICATIONS  --------------------------------------------------------------------------------  Allergies    No Known Allergies    Intolerances      Standing Inpatient Medications  aspirin  chewable 81 milliGRAM(s) Oral daily  atorvastatin 40 milliGRAM(s) Oral at bedtime  chlorhexidine 2% Cloths 1 Application(s) Topical daily  escitalopram 10 milliGRAM(s) Oral daily  finasteride 5 milliGRAM(s) Oral daily  heparin   Injectable 5000 Unit(s) SubCutaneous every 8 hours  levothyroxine 175 MICROGram(s) Oral daily  multivitamin 1 Tablet(s) Oral daily    PRN Inpatient Medications  acetaminophen     Tablet .. 650 milliGRAM(s) Oral every 6 hours PRN  aluminum hydroxide/magnesium hydroxide/simethicone Suspension 30 milliLiter(s) Oral every 4 hours PRN  melatonin 3 milliGRAM(s) Oral at bedtime PRN  ondansetron Injectable 4 milliGRAM(s) IV Push every 8 hours PRN  zolpidem 5 milliGRAM(s) Oral at bedtime PRN      REVIEW OF SYSTEMS  --------------------------------------------------------------------------------  Gen: No fevers/chills  Skin: No rashes  Head/Eyes/Ears: No headaches  Respiratory: No dyspnea  CV: No chest pain  GI: No abdominal pain  : No dysuria, hematuria  MSK: No edema  Heme: No easy bruising or bleeding  Psych: +Worsening depression    All other systems were reviewed and are negative, except as noted.    VITALS/PHYSICAL EXAM  --------------------------------------------------------------------------------  T(C): 36.6 (12-18-23 @ 12:27), Max: 36.7 (12-17-23 @ 18:20)  HR: 82 (12-18-23 @ 12:27) (82 - 89)  BP: 162/62 (12-18-23 @ 12:27) (154/72 - 162/72)  RR: 17 (12-18-23 @ 12:27) (17 - 18)  SpO2: 99% (12-18-23 @ 12:27) (97% - 99%)  Wt(kg): --  Height (cm): 170.2 (12-17-23 @ 11:10)  Weight (kg): 92.5 (12-17-23 @ 11:10)  BMI (kg/m2): 31.9 (12-17-23 @ 11:10)  BSA (m2): 2.04 (12-17-23 @ 11:10)    Physical Exam:  Gen: Elderly male, in NAD  HEENT: MMM  Pulm: CTA B/L  CV: S1S2  Abd: Soft, +BS   Ext: No LE edema B/L  Neuro: Awake and alert  Skin: Warm and dry  Vascular access: LUE AVF with +thrill and bruit    LABS/STUDIES  --------------------------------------------------------------------------------              9.8    7.65  >-----------<  402      [12-18-23 @ 05:41]              29.5     137  |  99  |  91  ----------------------------<  93      [12-18-23 @ 05:41]  5.3   |  18  |  7.56        Ca     9.4     [12-18-23 @ 05:41]      Mg     2.70     [12-18-23 @ 05:41]      Phos  7.4     [12-18-23 @ 05:41]    TPro  6.6  /  Alb  3.9  /  TBili  0.3  /  DBili  x   /  AST  9   /  ALT  12  /  AlkPhos  57  [12-17-23 @ 11:39]    Creatinine Trend:  SCr 7.56 [12-18 @ 05:41]  SCr 6.68 [12-17 @ 11:39]    TSH 0.42      [12-17-23 @ 11:39]  Lipid: chol 111, , HDL 36, LDL --      [12-18-23 @ 05:41] [facility] DIVISION OF KIDNEY DISEASES AND HYPERTENSION -- [phone]  -- INITIAL CONSULT NOTE  --------------------------------------------------------------------------------  HPI: 79 yo M with h/o ESRD on HD MWF, lung cancer, thyroid cancer, HTN HLD, MDD/anxiety, and BPH currently admitted for worsening depression. Nephrology was consulted for ESRD/HD management.     Pt. was seen and evaluated in the ER earlier today. Pt. reports initiating HD ~6 months ago. Per pt, etiology of ESRD is HTN. Pt. receives HD treatments at [facility] in [place] via LUE AVF. Pt. is unsure of the name of his outpatient nephrologist. Last outpatient HD treatment was on 12/15/23. Dry weight is 91 kg and pt. receives 1-2L UF with each treatment. Per pt, still produces large volume of urine. Pt. receives HIEU with HD. Also taking valtessa, and phosphorus binders. Pt. reports depression, otherwise feels well. Denies any headaches, fevers/chills, chest pain, SOB, and LE edema.      PAST HISTORY  --------------------------------------------------------------------------------  PAST MEDICAL & SURGICAL HISTORY:  ESRD on dialysis  Thyroid cancer  Lung cancer  HLD (hyperlipidemia)  Anxiety and depression  Acquired hypothyroidism    FAMILY HISTORY:  No pertinent family history in first degree relatives      PAST SOCIAL HISTORY:    ALLERGIES & MEDICATIONS  --------------------------------------------------------------------------------  Allergies    No Known Allergies    Intolerances      Standing Inpatient Medications  aspirin  chewable 81 milliGRAM(s) Oral daily  atorvastatin 40 milliGRAM(s) Oral at bedtime  chlorhexidine 2% Cloths 1 Application(s) Topical daily  escitalopram 10 milliGRAM(s) Oral daily  finasteride 5 milliGRAM(s) Oral daily  heparin   Injectable 5000 Unit(s) SubCutaneous every 8 hours  levothyroxine 175 MICROGram(s) Oral daily  multivitamin 1 Tablet(s) Oral daily    PRN Inpatient Medications  acetaminophen     Tablet .. 650 milliGRAM(s) Oral every 6 hours PRN  aluminum hydroxide/magnesium hydroxide/simethicone Suspension 30 milliLiter(s) Oral every 4 hours PRN  melatonin 3 milliGRAM(s) Oral at bedtime PRN  ondansetron Injectable 4 milliGRAM(s) IV Push every 8 hours PRN  zolpidem 5 milliGRAM(s) Oral at bedtime PRN      REVIEW OF SYSTEMS  --------------------------------------------------------------------------------  Gen: No fevers/chills  Skin: No rashes  Head/Eyes/Ears: No headaches  Respiratory: No dyspnea  CV: No chest pain  GI: No abdominal pain  : No dysuria, hematuria  MSK: No edema  Heme: No easy bruising or bleeding  Psych: +Worsening depression    All other systems were reviewed and are negative, except as noted.    VITALS/PHYSICAL EXAM  --------------------------------------------------------------------------------  T(C): 36.6 (12-18-23 @ 12:27), Max: 36.7 (12-17-23 @ 18:20)  HR: 82 (12-18-23 @ 12:27) (82 - 89)  BP: 162/62 (12-18-23 @ 12:27) (154/72 - 162/72)  RR: 17 (12-18-23 @ 12:27) (17 - 18)  SpO2: 99% (12-18-23 @ 12:27) (97% - 99%)  Wt(kg): --  Height (cm): 170.2 (12-17-23 @ 11:10)  Weight (kg): 92.5 (12-17-23 @ 11:10)  BMI (kg/m2): 31.9 (12-17-23 @ 11:10)  BSA (m2): 2.04 (12-17-23 @ 11:10)    Physical Exam:  Gen: Elderly male, in NAD  HEENT: MMM  Pulm: CTA B/L  CV: S1S2  Abd: Soft, +BS   Ext: No LE edema B/L  Neuro: Awake and alert  Skin: Warm and dry  Vascular access: LUE AVF with +thrill and bruit    LABS/STUDIES  --------------------------------------------------------------------------------              9.8    7.65  >-----------<  402      [12-18-23 @ 05:41]              29.5     137  |  99  |  91  ----------------------------<  93      [12-18-23 @ 05:41]  5.3   |  18  |  7.56        Ca     9.4     [12-18-23 @ 05:41]      Mg     2.70     [12-18-23 @ 05:41]      Phos  7.4     [12-18-23 @ 05:41]    TPro  6.6  /  Alb  3.9  /  TBili  0.3  /  DBili  x   /  AST  9   /  ALT  12  /  AlkPhos  57  [12-17-23 @ 11:39]    Creatinine Trend:  SCr 7.56 [12-18 @ 05:41]  SCr 6.68 [12-17 @ 11:39]    TSH 0.42      [12-17-23 @ 11:39]  Lipid: chol 111, , HDL 36, LDL --      [12-18-23 @ 05:41]

## 2023-12-18 NOTE — BH CONSULTATION LIAISON ASSESSMENT NOTE - DETAILS
mom had depression Pt endorses passive SI stating that he wants nature to take him. He denies intent or plan.

## 2023-12-18 NOTE — PROGRESS NOTE ADULT - TIME BILLING
Review of laboratory data, radiology results, consultants' recommendations, documentation in [place], discussion with patient/advanced care providers and interdisciplinary staff (such as , social workers, etc). Interventions were performed as documented above. Review of laboratory data, radiology results, consultants' recommendations, documentation in [place], discussion with patient/advanced care providers and interdisciplinary staff (such as , social workers, etc). Interventions were performed as documented above.

## 2023-12-18 NOTE — BH CONSULTATION LIAISON ASSESSMENT NOTE - CURRENT MEDICATION
MEDICATIONS  (STANDING):  aspirin  chewable 81 milliGRAM(s) Oral daily  atorvastatin 40 milliGRAM(s) Oral at bedtime  chlorhexidine 2% Cloths 1 Application(s) Topical daily  escitalopram 10 milliGRAM(s) Oral daily  finasteride 5 milliGRAM(s) Oral daily  heparin   Injectable 5000 Unit(s) SubCutaneous every 8 hours  levothyroxine 175 MICROGram(s) Oral daily  multivitamin 1 Tablet(s) Oral daily    MEDICATIONS  (PRN):  acetaminophen     Tablet .. 650 milliGRAM(s) Oral every 6 hours PRN Temp greater or equal to 38C (100.4F), Mild Pain (1 - 3)  aluminum hydroxide/magnesium hydroxide/simethicone Suspension 30 milliLiter(s) Oral every 4 hours PRN Dyspepsia  melatonin 3 milliGRAM(s) Oral at bedtime PRN Insomnia  ondansetron Injectable 4 milliGRAM(s) IV Push every 8 hours PRN Nausea and/or Vomiting  zolpidem 5 milliGRAM(s) Oral at bedtime PRN Insomnia

## 2023-12-18 NOTE — PROGRESS NOTE ADULT - PROBLEM SELECTOR PLAN 1
Acute on chronic as unstable  Voluntary admission    consulted: no 1:1 indicated; Paroxetine 10 mg daily, Lexapro 10 mg daily, PRN: Zolpidem 5 mg qhs. For agitation: PRN PO/IM Haldol 2.5 mg (if qtc <500ms), Ativan 1 mg q 6 if patient does not respond to behavioral interventions  *Rexulti not prescribed as per  no

## 2023-12-19 LAB
A1C WITH ESTIMATED AVERAGE GLUCOSE RESULT: 5.5 % — SIGNIFICANT CHANGE UP (ref 4–5.6)
A1C WITH ESTIMATED AVERAGE GLUCOSE RESULT: 5.5 % — SIGNIFICANT CHANGE UP (ref 4–5.6)
ANION GAP SERPL CALC-SCNC: 14 MMOL/L — SIGNIFICANT CHANGE UP (ref 7–14)
ANION GAP SERPL CALC-SCNC: 14 MMOL/L — SIGNIFICANT CHANGE UP (ref 7–14)
BUN SERPL-MCNC: 46 MG/DL — HIGH (ref 7–23)
BUN SERPL-MCNC: 46 MG/DL — HIGH (ref 7–23)
CALCIUM SERPL-MCNC: 9.4 MG/DL — SIGNIFICANT CHANGE UP (ref 8.4–10.5)
CALCIUM SERPL-MCNC: 9.4 MG/DL — SIGNIFICANT CHANGE UP (ref 8.4–10.5)
CHLORIDE SERPL-SCNC: 95 MMOL/L — LOW (ref 98–107)
CHLORIDE SERPL-SCNC: 95 MMOL/L — LOW (ref 98–107)
CO2 SERPL-SCNC: 29 MMOL/L — SIGNIFICANT CHANGE UP (ref 22–31)
CO2 SERPL-SCNC: 29 MMOL/L — SIGNIFICANT CHANGE UP (ref 22–31)
CREAT SERPL-MCNC: 5.07 MG/DL — HIGH (ref 0.5–1.3)
CREAT SERPL-MCNC: 5.07 MG/DL — HIGH (ref 0.5–1.3)
EGFR: 11 ML/MIN/1.73M2 — LOW
EGFR: 11 ML/MIN/1.73M2 — LOW
ESTIMATED AVERAGE GLUCOSE: 111 — SIGNIFICANT CHANGE UP
ESTIMATED AVERAGE GLUCOSE: 111 — SIGNIFICANT CHANGE UP
FERRITIN SERPL-MCNC: 450 NG/ML — HIGH (ref 30–400)
FERRITIN SERPL-MCNC: 450 NG/ML — HIGH (ref 30–400)
GLUCOSE BLDC GLUCOMTR-MCNC: 127 MG/DL — HIGH (ref 70–99)
GLUCOSE BLDC GLUCOMTR-MCNC: 127 MG/DL — HIGH (ref 70–99)
GLUCOSE SERPL-MCNC: 134 MG/DL — HIGH (ref 70–99)
GLUCOSE SERPL-MCNC: 134 MG/DL — HIGH (ref 70–99)
HBV CORE IGM SER-ACNC: SIGNIFICANT CHANGE UP
HBV CORE IGM SER-ACNC: SIGNIFICANT CHANGE UP
HBV SURFACE AB SER-ACNC: <3 MIU/ML — LOW
HBV SURFACE AB SER-ACNC: <3 MIU/ML — LOW
HBV SURFACE AG SER-ACNC: SIGNIFICANT CHANGE UP
HBV SURFACE AG SER-ACNC: SIGNIFICANT CHANGE UP
HCT VFR BLD CALC: 30.8 % — LOW (ref 39–50)
HCT VFR BLD CALC: 30.8 % — LOW (ref 39–50)
HGB BLD-MCNC: 10.5 G/DL — LOW (ref 13–17)
HGB BLD-MCNC: 10.5 G/DL — LOW (ref 13–17)
IRON SATN MFR SERPL: 39 % — SIGNIFICANT CHANGE UP (ref 14–50)
IRON SATN MFR SERPL: 39 % — SIGNIFICANT CHANGE UP (ref 14–50)
IRON SATN MFR SERPL: 89 UG/DL — SIGNIFICANT CHANGE UP (ref 45–165)
IRON SATN MFR SERPL: 89 UG/DL — SIGNIFICANT CHANGE UP (ref 45–165)
MAGNESIUM SERPL-MCNC: 2.2 MG/DL — SIGNIFICANT CHANGE UP (ref 1.6–2.6)
MAGNESIUM SERPL-MCNC: 2.2 MG/DL — SIGNIFICANT CHANGE UP (ref 1.6–2.6)
MCHC RBC-ENTMCNC: 34 PG — SIGNIFICANT CHANGE UP (ref 27–34)
MCHC RBC-ENTMCNC: 34 PG — SIGNIFICANT CHANGE UP (ref 27–34)
MCHC RBC-ENTMCNC: 34.1 GM/DL — SIGNIFICANT CHANGE UP (ref 32–36)
MCHC RBC-ENTMCNC: 34.1 GM/DL — SIGNIFICANT CHANGE UP (ref 32–36)
MCV RBC AUTO: 99.7 FL — SIGNIFICANT CHANGE UP (ref 80–100)
MCV RBC AUTO: 99.7 FL — SIGNIFICANT CHANGE UP (ref 80–100)
MRSA PCR RESULT.: SIGNIFICANT CHANGE UP
MRSA PCR RESULT.: SIGNIFICANT CHANGE UP
NRBC # BLD: 0 /100 WBCS — SIGNIFICANT CHANGE UP (ref 0–0)
NRBC # BLD: 0 /100 WBCS — SIGNIFICANT CHANGE UP (ref 0–0)
NRBC # FLD: 0 K/UL — SIGNIFICANT CHANGE UP (ref 0–0)
NRBC # FLD: 0 K/UL — SIGNIFICANT CHANGE UP (ref 0–0)
PHOSPHATE SERPL-MCNC: 4.2 MG/DL — SIGNIFICANT CHANGE UP (ref 2.5–4.5)
PHOSPHATE SERPL-MCNC: 4.2 MG/DL — SIGNIFICANT CHANGE UP (ref 2.5–4.5)
PLATELET # BLD AUTO: 398 K/UL — SIGNIFICANT CHANGE UP (ref 150–400)
PLATELET # BLD AUTO: 398 K/UL — SIGNIFICANT CHANGE UP (ref 150–400)
POTASSIUM SERPL-MCNC: 4.6 MMOL/L — SIGNIFICANT CHANGE UP (ref 3.5–5.3)
POTASSIUM SERPL-MCNC: 4.6 MMOL/L — SIGNIFICANT CHANGE UP (ref 3.5–5.3)
POTASSIUM SERPL-SCNC: 4.6 MMOL/L — SIGNIFICANT CHANGE UP (ref 3.5–5.3)
POTASSIUM SERPL-SCNC: 4.6 MMOL/L — SIGNIFICANT CHANGE UP (ref 3.5–5.3)
RBC # BLD: 3.09 M/UL — LOW (ref 4.2–5.8)
RBC # BLD: 3.09 M/UL — LOW (ref 4.2–5.8)
RBC # FLD: 12 % — SIGNIFICANT CHANGE UP (ref 10.3–14.5)
RBC # FLD: 12 % — SIGNIFICANT CHANGE UP (ref 10.3–14.5)
S AUREUS DNA NOSE QL NAA+PROBE: SIGNIFICANT CHANGE UP
S AUREUS DNA NOSE QL NAA+PROBE: SIGNIFICANT CHANGE UP
SARS-COV-2 RNA SPEC QL NAA+PROBE: SIGNIFICANT CHANGE UP
SARS-COV-2 RNA SPEC QL NAA+PROBE: SIGNIFICANT CHANGE UP
SODIUM SERPL-SCNC: 138 MMOL/L — SIGNIFICANT CHANGE UP (ref 135–145)
SODIUM SERPL-SCNC: 138 MMOL/L — SIGNIFICANT CHANGE UP (ref 135–145)
T3 SERPL-MCNC: 63 NG/DL — LOW (ref 80–200)
T3 SERPL-MCNC: 63 NG/DL — LOW (ref 80–200)
T4 AB SER-ACNC: 8.74 UG/DL — SIGNIFICANT CHANGE UP (ref 5.1–13)
T4 AB SER-ACNC: 8.74 UG/DL — SIGNIFICANT CHANGE UP (ref 5.1–13)
TIBC SERPL-MCNC: 226 UG/DL — SIGNIFICANT CHANGE UP (ref 220–430)
TIBC SERPL-MCNC: 226 UG/DL — SIGNIFICANT CHANGE UP (ref 220–430)
UIBC SERPL-MCNC: 137 UG/DL — SIGNIFICANT CHANGE UP (ref 110–370)
UIBC SERPL-MCNC: 137 UG/DL — SIGNIFICANT CHANGE UP (ref 110–370)
WBC # BLD: 9.09 K/UL — SIGNIFICANT CHANGE UP (ref 3.8–10.5)
WBC # BLD: 9.09 K/UL — SIGNIFICANT CHANGE UP (ref 3.8–10.5)
WBC # FLD AUTO: 9.09 K/UL — SIGNIFICANT CHANGE UP (ref 3.8–10.5)
WBC # FLD AUTO: 9.09 K/UL — SIGNIFICANT CHANGE UP (ref 3.8–10.5)

## 2023-12-19 PROCEDURE — 99232 SBSQ HOSP IP/OBS MODERATE 35: CPT

## 2023-12-19 PROCEDURE — 99232 SBSQ HOSP IP/OBS MODERATE 35: CPT | Mod: GC

## 2023-12-19 RX ORDER — LISINOPRIL 2.5 MG/1
5 TABLET ORAL DAILY
Refills: 0 | Status: DISCONTINUED | OUTPATIENT
Start: 2023-12-19 | End: 2023-12-20

## 2023-12-19 RX ADMIN — Medication 175 MICROGRAM(S): at 05:16

## 2023-12-19 RX ADMIN — Medication 81 MILLIGRAM(S): at 13:11

## 2023-12-19 RX ADMIN — FINASTERIDE 5 MILLIGRAM(S): 5 TABLET, FILM COATED ORAL at 13:11

## 2023-12-19 RX ADMIN — HEPARIN SODIUM 5000 UNIT(S): 5000 INJECTION INTRAVENOUS; SUBCUTANEOUS at 08:07

## 2023-12-19 RX ADMIN — SEVELAMER CARBONATE 1600 MILLIGRAM(S): 2400 POWDER, FOR SUSPENSION ORAL at 13:11

## 2023-12-19 RX ADMIN — SEVELAMER CARBONATE 1600 MILLIGRAM(S): 2400 POWDER, FOR SUSPENSION ORAL at 18:12

## 2023-12-19 RX ADMIN — ATORVASTATIN CALCIUM 40 MILLIGRAM(S): 80 TABLET, FILM COATED ORAL at 21:40

## 2023-12-19 RX ADMIN — CHLORHEXIDINE GLUCONATE 1 APPLICATION(S): 213 SOLUTION TOPICAL at 13:16

## 2023-12-19 RX ADMIN — Medication 5 MILLIGRAM(S): at 14:29

## 2023-12-19 RX ADMIN — ESCITALOPRAM OXALATE 10 MILLIGRAM(S): 10 TABLET, FILM COATED ORAL at 13:11

## 2023-12-19 RX ADMIN — HEPARIN SODIUM 5000 UNIT(S): 5000 INJECTION INTRAVENOUS; SUBCUTANEOUS at 14:29

## 2023-12-19 RX ADMIN — SEVELAMER CARBONATE 1600 MILLIGRAM(S): 2400 POWDER, FOR SUSPENSION ORAL at 09:07

## 2023-12-19 RX ADMIN — LISINOPRIL 5 MILLIGRAM(S): 2.5 TABLET ORAL at 18:13

## 2023-12-19 RX ADMIN — ATORVASTATIN CALCIUM 40 MILLIGRAM(S): 80 TABLET, FILM COATED ORAL at 00:56

## 2023-12-19 RX ADMIN — HEPARIN SODIUM 5000 UNIT(S): 5000 INJECTION INTRAVENOUS; SUBCUTANEOUS at 00:56

## 2023-12-19 RX ADMIN — SODIUM ZIRCONIUM CYCLOSILICATE 10 GRAM(S): 10 POWDER, FOR SUSPENSION ORAL at 11:36

## 2023-12-19 RX ADMIN — Medication 1 TABLET(S): at 13:11

## 2023-12-19 NOTE — PROGRESS NOTE ADULT - PROBLEM SELECTOR PLAN 1
Acute on chronic as unstable  Voluntary admission    consulted: no 1:1 indicated; Paroxetine 10 mg daily, Lexapro 10 mg daily, PRN: Zolpidem 5 mg qhs. For agitation: PRN PO/IM Haldol 2.5 mg (if qtc <500ms), Ativan 1 mg q 6 if patient does not respond to behavioral interventions  *Rexulti not prescribed as per

## 2023-12-19 NOTE — PROGRESS NOTE ADULT - NSPROGADDITIONALINFOA_GEN_ALL_CORE
discussed with pt and his relative at bedside that as per Psych notes, efforts are made to transfer to a psych unit which can accommodate HD.

## 2023-12-19 NOTE — PROGRESS NOTE ADULT - PROBLEM SELECTOR PLAN 4
Pt. with h/o hyperkalemia, takes PO Valtessa 8.4 gm on non-HD days. Serum potassium is 5.3 today. Plan for HD, as above. Recommend Lokelma 10 gm on non-HD days. Monitor serum potassium.

## 2023-12-19 NOTE — BH CONSULTATION LIAISON PROGRESS NOTE - NSBHASSESSMENTFT_PSY_ALL_CORE
Pt is a 79 y/o male, , domiciled at [facility], with pphx of depression, no prior psychiatric hospitalizations but [facility] and [facility], no hx of SA, no hx of NSSIB, no hx of violence or arrests, no active substance use, w/ pmh significant for hx of lung cancer, hx of thyroid cancer, ESRD on hemodialysis Monday Wednesday Friday, and high cholesterol was admitted for depression     On assessment, patient endorses worsening depressive symptoms over the past 3-4 months including depressed mood, insomnia, anhedonia, guilt, low energy, poor concentration, and suicidal ideation. He reports passive suicidal ideation, . Collateral from patient's daughter was obtained in the ED is significant for recent breakup with patient's longtime partner who he used to live with.     12/19: depressed, with passive SI, but no intent/plan. Forward-thinking, wants care.     Plan:   -- Routine observation  -- C/W Lexapro 10mg daily   -- C/W Paxil 5mg today, then discontinue altogether  -- Dispo: inpt psych with HD capabilities on voluntary basis ([facility] units include [facility], [facility], [facility]); transfer SW aware.   -- Will continue to follow Pt is a 77 y/o male, , domiciled at [facility], with pphx of depression, no prior psychiatric hospitalizations but was evaluated at [facility] a few weeks ago and stayed overnight, no hx of SA, no hx of NSSIB, no hx of violence or arrests, no active substance use, w/ pmh significant for hx of lung cancer, hx of thyroid cancer, ESRD on hemodialysis Monday Wednesday Friday, and high cholesterol was admitted for depression     On assessment, patient endorses worsening depressive symptoms over the past 3-4 months including depressed mood, insomnia, anhedonia, guilt, low energy, poor concentration, and suicidal ideation. He reports passive suicidal ideation, . Collateral from patient's daughter was obtained in the ED is significant for recent breakup with patient's longtime partner who he used to live with.     12/19: depressed, with passive SI, but no intent/plan. Forward-thinking, wants care.     Plan:   -- Routine observation  -- C/W Lexapro 10mg daily   -- C/W Paxil 5mg today, then discontinue altogether  -- Dispo: inpt psych with HD capabilities on voluntary basis ([facility] units include [facility], [facility], [facility]); transfer SW aware.   -- Will continue to follow

## 2023-12-19 NOTE — PROGRESS NOTE ADULT - PROBLEM SELECTOR PLAN 3
BP ranging from 140 -190s prior to dialysis. Not on medications at home.   - Start lisinopril 5mg qd.

## 2023-12-19 NOTE — PROGRESS NOTE ADULT - PROBLEM SELECTOR PLAN 3
Pt. with hyperphosphatemia in the setting of ESRD. Serum phosphorus (7.4) is above target range. Pt. takes PO Velphoro. Recommend to start sevelamer 2 tabs tid w/ meals. Monitor serum phosphorus, goal: 3.5-5.5. repeat Phos in am

## 2023-12-19 NOTE — PROGRESS NOTE ADULT - SUBJECTIVE AND OBJECTIVE BOX
[facility] DIVISION OF KIDNEY DISEASES AND HYPERTENSION -- HEMODIALYSIS NOTE   Nehrology Office [phone]  available on [employer] teams--> Oneal Mclaughlin   --------------------------------------------------------------------------------  Chief Complaint: ESRD/Ongoing hemodialysis requirement  pt is calm. no sob. he is eager to move into a psych unit.     24 hour events/subjective:      ALLERGIES & MEDICATIONS  --------------------------------------------------------------------------------  Allergies    No Known Allergies    Intolerances      Standing Inpatient Medications  aspirin  chewable 81 milliGRAM(s) Oral daily  atorvastatin 40 milliGRAM(s) Oral at bedtime  chlorhexidine 2% Cloths 1 Application(s) Topical daily  epoetin evan (EPOGEN) Injectable 4000 Unit(s) IV Push <User Schedule>  escitalopram 10 milliGRAM(s) Oral daily  escitalopram 10 milliGRAM(s) Oral daily  finasteride 5 milliGRAM(s) Oral daily  heparin   Injectable 5000 Unit(s) SubCutaneous every 8 hours  influenza  Vaccine (HIGH DOSE) 0.7 milliLiter(s) IntraMuscular once  levothyroxine 175 MICROGram(s) Oral daily  multivitamin 1 Tablet(s) Oral daily  PARoxetine 5 milliGRAM(s) Oral daily  sevelamer carbonate 1600 milliGRAM(s) Oral three times a day with meals  sodium zirconium cyclosilicate 10 Gram(s) Oral <User Schedule>    PRN Inpatient Medications  acetaminophen     Tablet .. 650 milliGRAM(s) Oral every 6 hours PRN  aluminum hydroxide/magnesium hydroxide/simethicone Suspension 30 milliLiter(s) Oral every 4 hours PRN  haloperidol     Tablet 2.5 milliGRAM(s) Oral every 6 hours PRN  haloperidol    Injectable 2.5 milliGRAM(s) IntraMuscular every 6 hours PRN  melatonin 3 milliGRAM(s) Oral at bedtime PRN  ondansetron Injectable 4 milliGRAM(s) IV Push every 8 hours PRN  sodium chloride 0.9% Bolus. 100 milliLiter(s) IV Bolus every 5 minutes PRN  zolpidem 5 milliGRAM(s) Oral at bedtime PRN      VITALS/PHYSICAL EXAM  --------------------------------------------------------------------------------  T(C): 36.8 (12-19-23 @ 00:30), Max: 36.8 (12-19-23 @ 00:30)  HR: 88 (12-19-23 @ 00:30) (82 - 90)  BP: 139/88 (12-19-23 @ 00:30) (139/88 - 195/80)  RR: 16 (12-19-23 @ 00:30) (16 - 19)  SpO2: 97% (12-19-23 @ 00:30) (97% - 99%)  Wt(kg): --        12-18-23 @ 07:01  -  12-19-23 @ 07:00  --------------------------------------------------------  IN: 400 mL / OUT: 2400 mL / NET: -2000 mL      Physical Exam:  	Gen NAD  	HEENT: no JVD  	Pulm: CTABL  	CV: S1S2,  	Abd: Soft,   	Ext:   - edema B/L LE   	Neuro: Awake and alert  	Skin: Warm and dry          Vascular:  AVF + bruit    LABS/STUDIES  --------------------------------------------------------------------------------              10.5   9.09  >-----------<  398      [12-19-23 @ 11:16]              30.8     137  |  99  |  91  ----------------------------<  93      [12-18-23 @ 05:41]  5.3   |  18  |  7.56        Ca     9.4     [12-18-23 @ 05:41]      Mg     2.70     [12-18-23 @ 05:41]      Phos  7.4     [12-18-23 @ 05:41]    TPro  6.6  /  Alb  3.9  /  TBili  0.3  /  DBili  x   /  AST  9   /  ALT  12  /  AlkPhos  57  [12-17-23 @ 11:39]          TSH 0.42      [12-17-23 @ 11:39]  Lipid: chol 111, , HDL 36, LDL --      [12-18-23 @ 05:41]    HCV 0.06, Nonreact      [12-18-23 @ 05:41]   [facility] DIVISION OF KIDNEY DISEASES AND HYPERTENSION -- HEMODIALYSIS NOTE   Nehrology Office [phone]  available on [employer] teams--> Oneal Mclaughlin   --------------------------------------------------------------------------------  Chief Complaint: ESRD/Ongoing hemodialysis requirement  pt is calm. no sob. he is eager to move into a psych unit.     24 hour events/subjective:      ALLERGIES & MEDICATIONS  --------------------------------------------------------------------------------  Allergies    No Known Allergies    Intolerances      Standing Inpatient Medications  aspirin  chewable 81 milliGRAM(s) Oral daily  atorvastatin 40 milliGRAM(s) Oral at bedtime  chlorhexidine 2% Cloths 1 Application(s) Topical daily  epoetin evan (EPOGEN) Injectable 4000 Unit(s) IV Push <User Schedule>  escitalopram 10 milliGRAM(s) Oral daily  escitalopram 10 milliGRAM(s) Oral daily  finasteride 5 milliGRAM(s) Oral daily  heparin   Injectable 5000 Unit(s) SubCutaneous every 8 hours  influenza  Vaccine (HIGH DOSE) 0.7 milliLiter(s) IntraMuscular once  levothyroxine 175 MICROGram(s) Oral daily  multivitamin 1 Tablet(s) Oral daily  PARoxetine 5 milliGRAM(s) Oral daily  sevelamer carbonate 1600 milliGRAM(s) Oral three times a day with meals  sodium zirconium cyclosilicate 10 Gram(s) Oral <User Schedule>    PRN Inpatient Medications  acetaminophen     Tablet .. 650 milliGRAM(s) Oral every 6 hours PRN  aluminum hydroxide/magnesium hydroxide/simethicone Suspension 30 milliLiter(s) Oral every 4 hours PRN  haloperidol     Tablet 2.5 milliGRAM(s) Oral every 6 hours PRN  haloperidol    Injectable 2.5 milliGRAM(s) IntraMuscular every 6 hours PRN  melatonin 3 milliGRAM(s) Oral at bedtime PRN  ondansetron Injectable 4 milliGRAM(s) IV Push every 8 hours PRN  sodium chloride 0.9% Bolus. 100 milliLiter(s) IV Bolus every 5 minutes PRN  zolpidem 5 milliGRAM(s) Oral at bedtime PRN      VITALS/PHYSICAL EXAM  --------------------------------------------------------------------------------  T(C): 36.8 (12-19-23 @ 00:30), Max: 36.8 (12-19-23 @ 00:30)  HR: 88 (12-19-23 @ 00:30) (82 - 90)  BP: 139/88 (12-19-23 @ 00:30) (139/88 - 195/80)  RR: 16 (12-19-23 @ 00:30) (16 - 19)  SpO2: 97% (12-19-23 @ 00:30) (97% - 99%)  Wt(kg): --        12-18-23 @ 07:01  -  12-19-23 @ 07:00  --------------------------------------------------------  IN: 400 mL / OUT: 2400 mL / NET: -2000 mL      Physical Exam:  	Gen NAD  	HEENT: no JVD  	Pulm: CTABL  	CV: S1S2,  	Abd: Soft,   	Ext:   - edema B/L LE   	Neuro: Awake and alert  	Skin: Warm and dry          Vascular:  AVF + bruit    LABS/STUDIES  --------------------------------------------------------------------------------              10.5   9.09  >-----------<  398      [12-19-23 @ 11:16]              30.8     137  |  99  |  91  ----------------------------<  93      [12-18-23 @ 05:41]  5.3   |  18  |  7.56        Ca     9.4     [12-18-23 @ 05:41]      Mg     2.70     [12-18-23 @ 05:41]      Phos  7.4     [12-18-23 @ 05:41]    TPro  6.6  /  Alb  3.9  /  TBili  0.3  /  DBili  x   /  AST  9   /  ALT  12  /  AlkPhos  57  [12-17-23 @ 11:39]          TSH 0.42      [12-17-23 @ 11:39]  Lipid: chol 111, , HDL 36, LDL --      [12-18-23 @ 05:41]    HCV 0.06, Nonreact      [12-18-23 @ 05:41]

## 2023-12-19 NOTE — PROGRESS NOTE ADULT - NSPROGADDITIONALINFOA_GEN_ALL_CORE
Dispo: to inpatient psych with dialysis unit. Likely [facility]. Dispo: to inpatient psych with dialysis unit. Likely [facility].

## 2023-12-19 NOTE — PROGRESS NOTE ADULT - PROBLEM SELECTOR PLAN 1
Pt. with ESRD on HD TIW (MWF schedule) via LUE AVF at [facility] in [place]. Last outpatient HD treatment was on 12/15/23. Pt. is euvolemic on exam.  Labs reviewed. Plan for maintenance HD treatment tomorrow.  Avoid nephrotoxins. Dose medications to ESRD/HD. Pt. with ESRD on HD TIW (MWF schedule) via LUE AVF at [facility] in [place]. Last outpatient HD treatment was on 12/15/23. Pt. is euvolemic on exam.  Labs reviewed. Plan for maintenance HD treatment tomorrow.  Avoid nephrotoxins. Dose medications to ESRD/HD.

## 2023-12-19 NOTE — PROGRESS NOTE ADULT - TIME BILLING
Review of laboratory data, radiology results, consultants' recommendations, documentation in [place], discussion with patient/advanced care providers and interdisciplinary staff (such as , social workers, etc). Interventions were performed as documented above. Review of laboratory data, radiology results, consultants' recommendations, documentation in [place], discussion with patient/advanced care providers and interdisciplinary staff (such as , social workers, etc). Interventions were performed as documented above.

## 2023-12-19 NOTE — BH CONSULTATION LIAISON PROGRESS NOTE - NSBHATTESTBILLING_PSY_A_CORE
48480-[id number] OBS or IP - moderate complexity OR 35-49 mins 82305-Wtzcjwtvme OBS or IP - moderate complexity OR 35-49 mins

## 2023-12-20 ENCOUNTER — TRANSCRIPTION ENCOUNTER (OUTPATIENT)
Age: 78
End: 2023-12-20

## 2023-12-20 LAB
ANION GAP SERPL CALC-SCNC: 12 MMOL/L — SIGNIFICANT CHANGE UP (ref 7–14)
ANION GAP SERPL CALC-SCNC: 12 MMOL/L — SIGNIFICANT CHANGE UP (ref 7–14)
BUN SERPL-MCNC: 48 MG/DL — HIGH (ref 7–23)
BUN SERPL-MCNC: 48 MG/DL — HIGH (ref 7–23)
CALCIUM SERPL-MCNC: 9.3 MG/DL — SIGNIFICANT CHANGE UP (ref 8.4–10.5)
CALCIUM SERPL-MCNC: 9.3 MG/DL — SIGNIFICANT CHANGE UP (ref 8.4–10.5)
CHLORIDE SERPL-SCNC: 98 MMOL/L — SIGNIFICANT CHANGE UP (ref 98–107)
CHLORIDE SERPL-SCNC: 98 MMOL/L — SIGNIFICANT CHANGE UP (ref 98–107)
CO2 SERPL-SCNC: 27 MMOL/L — SIGNIFICANT CHANGE UP (ref 22–31)
CO2 SERPL-SCNC: 27 MMOL/L — SIGNIFICANT CHANGE UP (ref 22–31)
CREAT SERPL-MCNC: 5.47 MG/DL — HIGH (ref 0.5–1.3)
CREAT SERPL-MCNC: 5.47 MG/DL — HIGH (ref 0.5–1.3)
EGFR: 10 ML/MIN/1.73M2 — LOW
EGFR: 10 ML/MIN/1.73M2 — LOW
FERRITIN SERPL-MCNC: 376 NG/ML — SIGNIFICANT CHANGE UP (ref 30–400)
FERRITIN SERPL-MCNC: 376 NG/ML — SIGNIFICANT CHANGE UP (ref 30–400)
GLUCOSE SERPL-MCNC: 106 MG/DL — HIGH (ref 70–99)
GLUCOSE SERPL-MCNC: 106 MG/DL — HIGH (ref 70–99)
HCT VFR BLD CALC: 30.6 % — LOW (ref 39–50)
HCT VFR BLD CALC: 30.6 % — LOW (ref 39–50)
HGB BLD-MCNC: 10.4 G/DL — LOW (ref 13–17)
HGB BLD-MCNC: 10.4 G/DL — LOW (ref 13–17)
IRON SATN MFR SERPL: 139 UG/DL — SIGNIFICANT CHANGE UP (ref 45–165)
IRON SATN MFR SERPL: 139 UG/DL — SIGNIFICANT CHANGE UP (ref 45–165)
IRON SATN MFR SERPL: 68 % — HIGH (ref 14–50)
IRON SATN MFR SERPL: 68 % — HIGH (ref 14–50)
MAGNESIUM SERPL-MCNC: 2.3 MG/DL — SIGNIFICANT CHANGE UP (ref 1.6–2.6)
MAGNESIUM SERPL-MCNC: 2.3 MG/DL — SIGNIFICANT CHANGE UP (ref 1.6–2.6)
MCHC RBC-ENTMCNC: 34 GM/DL — SIGNIFICANT CHANGE UP (ref 32–36)
MCHC RBC-ENTMCNC: 34 GM/DL — SIGNIFICANT CHANGE UP (ref 32–36)
MCHC RBC-ENTMCNC: 34.7 PG — HIGH (ref 27–34)
MCHC RBC-ENTMCNC: 34.7 PG — HIGH (ref 27–34)
MCV RBC AUTO: 102 FL — HIGH (ref 80–100)
MCV RBC AUTO: 102 FL — HIGH (ref 80–100)
NRBC # BLD: 0 /100 WBCS — SIGNIFICANT CHANGE UP (ref 0–0)
NRBC # BLD: 0 /100 WBCS — SIGNIFICANT CHANGE UP (ref 0–0)
NRBC # FLD: 0 K/UL — SIGNIFICANT CHANGE UP (ref 0–0)
NRBC # FLD: 0 K/UL — SIGNIFICANT CHANGE UP (ref 0–0)
PHOSPHATE SERPL-MCNC: 5.1 MG/DL — HIGH (ref 2.5–4.5)
PHOSPHATE SERPL-MCNC: 5.1 MG/DL — HIGH (ref 2.5–4.5)
PLATELET # BLD AUTO: 414 K/UL — HIGH (ref 150–400)
PLATELET # BLD AUTO: 414 K/UL — HIGH (ref 150–400)
POTASSIUM SERPL-MCNC: 4 MMOL/L — SIGNIFICANT CHANGE UP (ref 3.5–5.3)
POTASSIUM SERPL-MCNC: 4 MMOL/L — SIGNIFICANT CHANGE UP (ref 3.5–5.3)
POTASSIUM SERPL-SCNC: 4 MMOL/L — SIGNIFICANT CHANGE UP (ref 3.5–5.3)
POTASSIUM SERPL-SCNC: 4 MMOL/L — SIGNIFICANT CHANGE UP (ref 3.5–5.3)
RBC # BLD: 3 M/UL — LOW (ref 4.2–5.8)
RBC # BLD: 3 M/UL — LOW (ref 4.2–5.8)
RBC # FLD: 12.1 % — SIGNIFICANT CHANGE UP (ref 10.3–14.5)
RBC # FLD: 12.1 % — SIGNIFICANT CHANGE UP (ref 10.3–14.5)
SODIUM SERPL-SCNC: 137 MMOL/L — SIGNIFICANT CHANGE UP (ref 135–145)
SODIUM SERPL-SCNC: 137 MMOL/L — SIGNIFICANT CHANGE UP (ref 135–145)
TIBC SERPL-MCNC: 203 UG/DL — LOW (ref 220–430)
TIBC SERPL-MCNC: 203 UG/DL — LOW (ref 220–430)
UIBC SERPL-MCNC: 64 UG/DL — LOW (ref 110–370)
UIBC SERPL-MCNC: 64 UG/DL — LOW (ref 110–370)
WBC # BLD: 7.13 K/UL — SIGNIFICANT CHANGE UP (ref 3.8–10.5)
WBC # BLD: 7.13 K/UL — SIGNIFICANT CHANGE UP (ref 3.8–10.5)
WBC # FLD AUTO: 7.13 K/UL — SIGNIFICANT CHANGE UP (ref 3.8–10.5)
WBC # FLD AUTO: 7.13 K/UL — SIGNIFICANT CHANGE UP (ref 3.8–10.5)

## 2023-12-20 PROCEDURE — 99232 SBSQ HOSP IP/OBS MODERATE 35: CPT

## 2023-12-20 PROCEDURE — 93010 ELECTROCARDIOGRAM REPORT: CPT

## 2023-12-20 PROCEDURE — 90935 HEMODIALYSIS ONE EVALUATION: CPT | Mod: GC

## 2023-12-20 RX ORDER — LISINOPRIL 2.5 MG/1
2.5 TABLET ORAL DAILY
Refills: 0 | Status: DISCONTINUED | OUTPATIENT
Start: 2023-12-20 | End: 2023-12-21

## 2023-12-20 RX ORDER — LIDOCAINE AND PRILOCAINE CREAM 25; 25 MG/G; MG/G
1 CREAM TOPICAL DAILY
Refills: 0 | Status: DISCONTINUED | OUTPATIENT
Start: 2023-12-20 | End: 2023-12-21

## 2023-12-20 RX ADMIN — ERYTHROPOIETIN 4000 UNIT(S): 10000 INJECTION, SOLUTION INTRAVENOUS; SUBCUTANEOUS at 07:25

## 2023-12-20 RX ADMIN — Medication 81 MILLIGRAM(S): at 12:55

## 2023-12-20 RX ADMIN — SEVELAMER CARBONATE 1600 MILLIGRAM(S): 2400 POWDER, FOR SUSPENSION ORAL at 10:06

## 2023-12-20 RX ADMIN — FINASTERIDE 5 MILLIGRAM(S): 5 TABLET, FILM COATED ORAL at 12:53

## 2023-12-20 RX ADMIN — Medication 175 MICROGRAM(S): at 05:19

## 2023-12-20 RX ADMIN — SEVELAMER CARBONATE 1600 MILLIGRAM(S): 2400 POWDER, FOR SUSPENSION ORAL at 12:53

## 2023-12-20 RX ADMIN — ESCITALOPRAM OXALATE 10 MILLIGRAM(S): 10 TABLET, FILM COATED ORAL at 12:55

## 2023-12-20 RX ADMIN — Medication 1 TABLET(S): at 12:55

## 2023-12-20 RX ADMIN — ATORVASTATIN CALCIUM 40 MILLIGRAM(S): 80 TABLET, FILM COATED ORAL at 22:08

## 2023-12-20 RX ADMIN — LIDOCAINE AND PRILOCAINE CREAM 1 APPLICATION(S): 25; 25 CREAM TOPICAL at 06:07

## 2023-12-20 RX ADMIN — CHLORHEXIDINE GLUCONATE 1 APPLICATION(S): 213 SOLUTION TOPICAL at 13:12

## 2023-12-20 RX ADMIN — SEVELAMER CARBONATE 1600 MILLIGRAM(S): 2400 POWDER, FOR SUSPENSION ORAL at 17:21

## 2023-12-20 NOTE — PROGRESS NOTE ADULT - TIME BILLING
Review of laboratory data, radiology results, consultants' recommendations, documentation in [place], discussion with patient/advanced care providers and interdisciplinary staff (such as , social workers, etc). Interventions were performed as documented above. Review of laboratory data, radiology results, consultants' recommendations, documentation in [place], discussion with patient/advanced care providers and interdisciplinary staff (such as , social workers, etc). Interventions were performed as documented above.

## 2023-12-20 NOTE — DISCHARGE NOTE PROVIDER - CARE PROVIDER_API CALL
Oneal Mclaughlin  Nephrology  [address] [place] [id number]  Phone: [phone]  Fax: [phone]  Follow Up Time:    Oneal Mclaughlin  Nephrology  [address] [place] [id number]  Phone: [phone]  Fax: [phone]  Follow Up Time:

## 2023-12-20 NOTE — DISCHARGE NOTE PROVIDER - CARE PROVIDERS DIRECT ADDRESSES
[email or web address]

## 2023-12-20 NOTE — DISCHARGE NOTE PROVIDER - NSDCMRMEDTOKEN_GEN_ALL_CORE_FT
aspirin 81 mg oral tablet: 1 tab(s) orally once a day  finasteride 5 mg oral tablet: 1 tab(s) orally once a day  levothyroxine 175 mcg (0.175 mg) oral tablet: 1 tab(s) orally once a day  Lexapro 10 mg oral tablet: 1 tab(s) orally once a day  PARoxetine 10 mg oral tablet: 1 tab(s) orally once a day  renavite: 1 tablet daily  REXULTI 1 MG TABS: 1 tab(s) orally once a day  rosuvastatin 10 mg oral capsule: 1 cap(s) orally once a day  Velphoro 500 mg oral tablet, chewable: 2 tab(s) chewed 3 times a day  Veltassa 8.4 g oral powder for reconstitution: 8.4 gram(s) orally every other day on non HD days  ZOLPIDEM TARTRATE 5 MG TABS: 1 tab(s) orally once a day (at bedtime) as needed for  insomnia   aspirin 81 mg oral tablet: 1 tab(s) orally once a day  escitalopram 10 mg oral tablet: 1 tab(s) orally once a day  finasteride 5 mg oral tablet: 1 tab(s) orally once a day  levothyroxine 175 mcg (0.175 mg) oral tablet: 1 tab(s) orally once a day  lidocaine-prilocaine 2.5%-2.5% topical cream: 1 Apply topically to affected area once a day As needed w dialysis  lisinopril 2.5 mg oral tablet: 1 tab(s) orally once a day  melatonin 3 mg oral tablet: 1 tab(s) orally once a day (at bedtime) As needed Insomnia  Multiple Vitamins oral tablet: 1 tab(s) orally once a day  rosuvastatin 10 mg oral capsule: 1 cap(s) orally once a day  sevelamer carbonate 800 mg oral tablet: 2 tab(s) orally 3 times a day (with meals)  sodium zirconium cyclosilicate: orally 4 times a week Sun/ Tues/ Thurs/ Sat  zolpidem 5 mg oral tablet: 1 tab(s) orally once a day (at bedtime) As needed Insomnia

## 2023-12-20 NOTE — PHYSICAL THERAPY INITIAL EVALUATION ADULT - ADDITIONAL COMMENTS
Pt presents from the [facility] assisted living facility, +stairs in the facility, was independent with all functional mobility and ADL performance without use of an assistive device prior to admission.     Pt left seated at edge of bed with all lines intact, all needs in reach, bed alarm set, in NAD. REED Quispe aware. Heart rate 102 beats per minute. Pt presents from the [facility] assisted [facility] facility, +stairs in the facility, was independent with all functional mobility and ADL performance without use of an assistive device prior to admission.     Pt left seated at edge of bed with all lines intact, all needs in reach, bed alarm set, in NAD. REED Quispe aware. Heart rate 102 beats per minute.

## 2023-12-20 NOTE — PROGRESS NOTE ADULT - PROBLEM SELECTOR PLAN 4
Pt. with h/o hyperkalemia, takes PO Valtessa 8.4 gm on non-HD days. Serum potassium is 4.0 today. Continue PO Lokelma 10 gm on non-HD days. Monitor serum potassium.    If you have any questions, please feel free to contact me  Cecy Velasco  Nephrology Fellow  [phone] / [employer] Teams(Preferred)  (After 5pm or on weekends please page the on-call fellow) Pt. with h/o hyperkalemia, takes PO Valtessa 8.4 gm on non-HD days. Serum potassium is 4.0 today. Continue PO Lokelma 10 gm on non-HD days. Monitor serum potassium.    If you have any questions, please feel free to contact me  Cecy Velasco  Nephrology Fellow  [phone] / [employer] Teams(Preferred)  (After 5pm or on weekends please page the on-call fellow)

## 2023-12-20 NOTE — DISCHARGE NOTE PROVIDER - HOSPITAL COURSE
77 y/o M ESRD on HD MWF, hx of lung cancer and thyroid cancer (no longer on treatment), HLD, anxiety/depression and BPH who presents for worsening depression symptoms since August. No SI or HI. On admission, VSS. TSH wnl. Renal consulted for dialysis. Electrolytes stable. Of note, pt. blood pressure elevated. SBP 190s. After dialysis, blood pressure improves to SBP 140s. Started on lisinopril 5mg and blood pressure dropped to SBP 95 after dialysis. Lisinopril dose dropped to 2.5mg and now BP is better controlled. EKG NSR. Medically stable for discharge to inpatient psych.

## 2023-12-20 NOTE — PROGRESS NOTE ADULT - SUBJECTIVE AND OBJECTIVE BOX
Hospitalist Progress Note  Olimpia Rodriguez MD Pager # [phone]    OVERNIGHT EVENTS: LOTUS    SUBJECTIVE / INTERVAL HPI: Patient seen and examined at bedside. Patient reports that he feels more comfortable now that he is in a room. Discussed his blood pressure and he says that he had high blood pressure in the past that improved with dialysis. They monitor his pressure at dialysis to make sure that it does not go too low. This AM when his pressure was on the lower side, he denied any dizziness or lightheadedness. No chest pain or SOB. All other ROS negative.     VITAL SIGNS:  Vital Signs Last 24 Hrs  T(C): 36.9 (20 Dec 2023 15:32), Max: 36.9 (20 Dec 2023 15:32)  T(F): 98.5 (20 Dec 2023 15:32), Max: 98.5 (20 Dec 2023 15:32)  HR: 65 (20 Dec 2023 15:32) (65 - 109)  BP: 111/63 (20 Dec 2023 15:32) (91/55 - 172/75)  BP(mean): --  RR: 17 (20 Dec 2023 15:32) (17 - 17)  SpO2: 99% (20 Dec 2023 15:32) (96% - 99%)    Parameters below as of 20 Dec 2023 15:32  Patient On (Oxygen Delivery Method): room air        PHYSICAL EXAM:    General: Comfortable and resting in bed   HEENT: NC/AT; PERRL, anicteric sclera; MMM  Neck: supple  Cardiovascular: +S1/S2; RRR  Respiratory: CTA B/L; no W/R/R  Gastrointestinal: soft, NT/ND; +BSx4  Extremities: WWP; no edema, clubbing or cyanosis  Vascular: 2+ radial, DP/PT pulses B/L  Neurological: AAOx3; no focal deficits    MEDICATIONS:  MEDICATIONS  (STANDING):  aspirin  chewable 81 milliGRAM(s) Oral daily  atorvastatin 40 milliGRAM(s) Oral at bedtime  chlorhexidine 2% Cloths 1 Application(s) Topical daily  epoetin evan (EPOGEN) Injectable 4000 Unit(s) IV Push <User Schedule>  escitalopram 10 milliGRAM(s) Oral daily  finasteride 5 milliGRAM(s) Oral daily  heparin   Injectable 5000 Unit(s) SubCutaneous every 8 hours  influenza  Vaccine (HIGH DOSE) 0.7 milliLiter(s) IntraMuscular once  levothyroxine 175 MICROGram(s) Oral daily  lisinopril 2.5 milliGRAM(s) Oral daily  multivitamin 1 Tablet(s) Oral daily  sevelamer carbonate 1600 milliGRAM(s) Oral three times a day with meals  sodium zirconium cyclosilicate 10 Gram(s) Oral <User Schedule>    MEDICATIONS  (PRN):  acetaminophen     Tablet .. 650 milliGRAM(s) Oral every 6 hours PRN Temp greater or equal to 38C (100.4F), Mild Pain (1 - 3)  aluminum hydroxide/magnesium hydroxide/simethicone Suspension 30 milliLiter(s) Oral every 4 hours PRN Dyspepsia  haloperidol     Tablet 2.5 milliGRAM(s) Oral every 6 hours PRN agitation  haloperidol    Injectable 2.5 milliGRAM(s) IntraMuscular every 6 hours PRN Agitation  lidocaine/prilocaine Cream 1 Application(s) Topical daily PRN w dialysis  melatonin 3 milliGRAM(s) Oral at bedtime PRN Insomnia  ondansetron Injectable 4 milliGRAM(s) IV Push every 8 hours PRN Nausea and/or Vomiting  sodium chloride 0.9% Bolus. 100 milliLiter(s) IV Bolus every 5 minutes PRN SBP LESS THAN or EQUAL to 90 mmHg  zolpidem 5 milliGRAM(s) Oral at bedtime PRN Insomnia      ALLERGIES:  Allergies    No Known Allergies    Intolerances        LABS:                        10.4   7.13  )-----------( 414      ( 20 Dec 2023 06:00 )             30.6     12-20    137  |  98  |  48<H>  ----------------------------<  106<H>  4.0   |  27  |  5.47<H>    Ca    9.3      20 Dec 2023 06:00  Phos  5.1     12-20  Mg     2.30     12-20        Urinalysis Basic - ( 20 Dec 2023 06:00 )    Color: x / Appearance: x / SG: x / pH: x  Gluc: 106 mg/dL / Ketone: x  / Bili: x / Urobili: x   Blood: x / Protein: x / Nitrite: x   Leuk Esterase: x / RBC: x / WBC x   Sq Epi: x / Non Sq Epi: x / Bacteria: x      CAPILLARY BLOOD GLUCOSE      POCT Blood Glucose.: 127 mg/dL (19 Dec 2023 09:16)      RADIOLOGY & ADDITIONAL TESTS: Reviewed.    ASSESSMENT:    PLAN:  Hospitalist Progress Note  Olimpia Rodriguez MD Pager # [phone]    OVERNIGHT EVENTS: LOTUS    SUBJECTIVE / INTERVAL HPI: Patient seen and examined at bedside. Patient reports that he feels more comfortable now that he is in a room. Discussed his blood pressure and he says that he had high blood pressure in the past that improved with dialysis. They monitor his pressure at dialysis to make sure that it does not go too low. This AM when his pressure was on the lower side, he denied any dizziness or lightheadedness. No chest pain or SOB. All other ROS negative.     VITAL SIGNS:  Vital Signs Last 24 Hrs  T(C): 36.9 (20 Dec 2023 15:32), Max: 36.9 (20 Dec 2023 15:32)  T(F): 98.5 (20 Dec 2023 15:32), Max: 98.5 (20 Dec 2023 15:32)  HR: 65 (20 Dec 2023 15:32) (65 - 109)  BP: 111/63 (20 Dec 2023 15:32) (91/55 - 172/75)  BP(mean): --  RR: 17 (20 Dec 2023 15:32) (17 - 17)  SpO2: 99% (20 Dec 2023 15:32) (96% - 99%)    Parameters below as of 20 Dec 2023 15:32  Patient On (Oxygen Delivery Method): room air        PHYSICAL EXAM:    General: Comfortable and resting in bed   HEENT: NC/AT; PERRL, anicteric sclera; MMM  Neck: supple  Cardiovascular: +S1/S2; RRR  Respiratory: CTA B/L; no W/R/R  Gastrointestinal: soft, NT/ND; +BSx4  Extremities: WWP; no edema, clubbing or cyanosis  Vascular: 2+ radial, DP/PT pulses B/L  Neurological: AAOx3; no focal deficits    MEDICATIONS:  MEDICATIONS  (STANDING):  aspirin  chewable 81 milliGRAM(s) Oral daily  atorvastatin 40 milliGRAM(s) Oral at bedtime  chlorhexidine 2% Cloths 1 Application(s) Topical daily  epoetin evan (EPOGEN) Injectable 4000 Unit(s) IV Push <User Schedule>  escitalopram 10 milliGRAM(s) Oral daily  finasteride 5 milliGRAM(s) Oral daily  heparin   Injectable 5000 Unit(s) SubCutaneous every 8 hours  influenza  Vaccine (HIGH DOSE) 0.7 milliLiter(s) IntraMuscular once  levothyroxine 175 MICROGram(s) Oral daily  lisinopril 2.5 milliGRAM(s) Oral daily  multivitamin 1 Tablet(s) Oral daily  sevelamer carbonate 1600 milliGRAM(s) Oral three times a day with meals  sodium zirconium cyclosilicate 10 Gram(s) Oral <User Schedule>    MEDICATIONS  (PRN):  acetaminophen     Tablet .. 650 milliGRAM(s) Oral every 6 hours PRN Temp greater or equal to 38C (100.4F), Mild Pain (1 - 3)  aluminum hydroxide/magnesium hydroxide/simethicone Suspension 30 milliLiter(s) Oral every 4 hours PRN Dyspepsia  haloperidol     Tablet 2.5 milliGRAM(s) Oral every 6 hours PRN agitation  haloperidol    Injectable 2.5 milliGRAM(s) IntraMuscular every 6 hours PRN Agitation  lidocaine/prilocaine Cream 1 Application(s) Topical daily PRN w dialysis  melatonin 3 milliGRAM(s) Oral at bedtime PRN Insomnia  ondansetron Injectable 4 milliGRAM(s) IV Push every 8 hours PRN Nausea and/or Vomiting  sodium chloride 0.9% Bolus. 100 milliLiter(s) IV Bolus every 5 minutes PRN SBP LESS THAN or EQUAL to 90 mmHg  zolpidem 5 milliGRAM(s) Oral at bedtime PRN Insomnia      ALLERGIES:  Allergies    No Known Allergies    Intolerances        LABS:                        10.4   7.13  )-----------( 414      ( 20 Dec 2023 06:00 )             30.6     12-20    137  |  98  |  48<H>  ----------------------------<  106<H>  4.0   |  27  |  5.47<H>    Ca    9.3      20 Dec 2023 06:00  Phos  5.1     12-20  Mg     2.30     12-20        Urinalysis Basic - ( 20 Dec 2023 06:00 )    Color: x / Appearance: x / SG: x / pH: x  Gluc: 106 mg/dL / Ketone: x  / Bili: x / Urobili: x   Blood: x / Protein: x / Nitrite: x   Leuk Esterase: x / RBC: x / WBC x   Sq Epi: x / Non Sq Epi: x / Bacteria: x      CAPILLARY BLOOD GLUCOSE      POCT Blood Glucose.: 127 mg/dL (19 Dec 2023 09:16)      RADIOLOGY & ADDITIONAL TESTS: Reviewed.    ASSESSMENT:    PLAN:

## 2023-12-20 NOTE — PROGRESS NOTE ADULT - ATTENDING COMMENTS
ESRD on HD MWF  hyperkalemia, Hyperphosphatemia, Anemia management and 5ecommecenadtyions as outlined above   dose meds per eGFR
ESRD on HD  pt was evaluated during HD session this am. BP and Blood flow during dialysis are acceptable   anemia, hyperphosphatemia and Hyperkalemia. management as per outlined above   he is now looking to outpatient treatment options for depression.

## 2023-12-20 NOTE — PROGRESS NOTE ADULT - PROBLEM SELECTOR PLAN 3
BP ranging from 140 -190s prior to dialysis. Labile pressures. He received lisinopril 5mg yesterday evening at 6PM and this AM his systolic pressure was in the 90s. Pt. states he was asymptomatic at the time. HR went to 100s. EKG showing sinus tachycardia. Now HR and BP normalized.   - Given, elevated pressures over the prior days - will continue with lisinopril at a reduced dose - 2.5mg qd. If pressure is too low to give - try to reschedule till later.

## 2023-12-20 NOTE — PHYSICAL THERAPY INITIAL EVALUATION ADULT - PERTINENT HX OF CURRENT PROBLEM, REHAB EVAL
78 year old male with a PMH stated below, presents with progressive depressive symptoms since August 2023.

## 2023-12-20 NOTE — PROGRESS NOTE ADULT - PROBLEM SELECTOR PLAN 1
Pt. with ESRD on HD TIW (MWF schedule) via LUE AVF at [facility] in [place]. Pt. seen and evaluated in the HD unit, receiving HD. BP stable, and functioning well. Pt. is euvolemic on exam.  Labs reviewed. Avoid nephrotoxins. Dose medications to ESRD/HD. Pt. with ESRD on HD TIW (MWF schedule) via LUE AVF at [facility] in [place]. Pt. seen and evaluated in the HD unit, receiving HD. BP stable, and functioning well. Pt. is euvolemic on exam.  Labs reviewed. Avoid nephrotoxins. Dose medications to ESRD/HD.

## 2023-12-20 NOTE — PHYSICAL THERAPY INITIAL EVALUATION ADULT - GENERAL OBSERVATIONS, REHAB EVAL
Pt received seated at edge of bed. all lines intact, in NAD. Pt agreeable to participate in PT evaluation.

## 2023-12-20 NOTE — PROGRESS NOTE ADULT - PROBLEM SELECTOR PLAN 3
Pt. with hyperphosphatemia in the setting of ESRD. Serum phosphorus (5.1) is within target range. Pt. takes PO Velphoro. Recommend to continue sevelamer 2 tabs tid w/ meals. Monitor serum phosphorus, goal: 3.5-5.5. repeat Phos in am Pt. with hyperphosphatemia in the setting of ESRD. Serum phosphorus (5.1) is within target range. Pt. takes PO Velphoro. Recommend to continue sevelamer 2 tabs tid w/ meals. Monitor serum phosphorus, goal: 3.5-5.5. repeat Phos 5.1

## 2023-12-20 NOTE — BH CONSULTATION LIAISON PROGRESS NOTE - NSBHASSESSMENTFT_PSY_ALL_CORE
Pt is a 79 y/o male, , domiciled at [facility], with pphx of depression, no prior psychiatric hospitalizations but was evaluated at [facility] a few weeks ago and stayed overnight, no hx of SA, no hx of NSSIB, no hx of violence or arrests, no active substance use, w/ pmh significant for hx of lung cancer, hx of thyroid cancer, ESRD on hemodialysis Monday Wednesday Friday, and high cholesterol was admitted for depression     On assessment, patient endorses worsening depressive symptoms over the past 3-4 months including depressed mood, insomnia, anhedonia, guilt, low energy, poor concentration, and suicidal ideation. He reports passive suicidal ideation, . Collateral from patient's daughter was obtained in the ED is significant for recent breakup with patient's longtime partner who he used to live with.     12/19: depressed, with passive SI, but no intent/plan. Forward-thinking, wants care.   12/20: after more time, patient continues to acknowledge that he is depressed, anxious and not doing well and would like to pursue voluntary admission. I agree that he meets criteria for voluntary admission and would benefit from said admission. The fact that yesterday he was considering to no longer seek inpatient admission, but with further time thinking again became in his words "hesitant" I believe validates the severity of his depression which he is seeking help for.      Plan:   -- Routine observation  -- C/W Lexapro 10mg daily   	[] likely further titrate tomorrow  [] discontinue paxil today  -- Dispo: inpt psych with HD capabilities as well as CPAP capabilities (per SW- patient on CPAP) on voluntary basis ([facility] units include [facility], [facility], [facility]); transfer SW aware.   -- Will continue to follow Pt is a 77 y/o male, , domiciled at [facility], with pphx of depression, no prior psychiatric hospitalizations but was evaluated at [facility] a few weeks ago and stayed overnight, no hx of SA, no hx of NSSIB, no hx of violence or arrests, no active substance use, w/ pmh significant for hx of lung cancer, hx of thyroid cancer, ESRD on hemodialysis Monday Wednesday Friday, and high cholesterol was admitted for depression     On assessment, patient endorses worsening depressive symptoms over the past 3-4 months including depressed mood, insomnia, anhedonia, guilt, low energy, poor concentration, and suicidal ideation. He reports passive suicidal ideation, . Collateral from patient's daughter was obtained in the ED is significant for recent breakup with patient's longtime partner who he used to live with.     12/19: depressed, with passive SI, but no intent/plan. Forward-thinking, wants care.   12/20: after more time, patient continues to acknowledge that he is depressed, anxious and not doing well and would like to pursue voluntary admission. I agree that he meets criteria for voluntary admission and would benefit from said admission. The fact that yesterday he was considering to no longer seek inpatient admission, but with further time thinking again became in his words "hesitant" I believe validates the severity of his depression which he is seeking help for.      Plan:   -- Routine observation  -- C/W Lexapro 10mg daily   	[] likely further titrate tomorrow  [] discontinue paxil today  -- Dispo: inpt psych with HD capabilities as well as CPAP capabilities (per SW- patient on CPAP) on voluntary basis ([facility] units include [facility], [facility], [facility]); transfer SW aware.   -- Will continue to follow

## 2023-12-20 NOTE — PHYSICAL THERAPY INITIAL EVALUATION ADULT - NAME OF CLINICIAN
Patient is scheduled on 12-21-18 for a Loop recorder implant with Dr. Laird. No meds to stop. Patient to check in at 11:00 for a 1:00 procedure. H&P was done on 12-13-18 by ATIYA Kessler.  Pre admit to call patient.  
REED Quispe

## 2023-12-20 NOTE — PROGRESS NOTE ADULT - SUBJECTIVE AND OBJECTIVE BOX
[facility] Division of Kidney Diseases & Hypertension  FOLLOW UP NOTE  [phone]--------------------------------------------------------------------------------    Chief Complaint: ESRD/Ongoing hemodialysis requirement    24 hour events/subjective: Pt. was seen and evaluated in the HD unit receiving HD. Vitals stable, tolerating well. Pt. reports feeling well, offers no complaints. States he is being discharged today. Denies any headaches, fevers/chills, chest pain, SOB, and LE edema.      PAST HISTORY  --------------------------------------------------------------------------------  No significant changes to PMH, PSH, FHx, SHx, unless otherwise noted    ALLERGIES & MEDICATIONS  --------------------------------------------------------------------------------  Allergies    No Known Allergies    Intolerances      Standing Inpatient Medications  aspirin  chewable 81 milliGRAM(s) Oral daily  atorvastatin 40 milliGRAM(s) Oral at bedtime  chlorhexidine 2% Cloths 1 Application(s) Topical daily  epoetin evan (EPOGEN) Injectable 4000 Unit(s) IV Push <User Schedule>  escitalopram 10 milliGRAM(s) Oral daily  finasteride 5 milliGRAM(s) Oral daily  heparin   Injectable 5000 Unit(s) SubCutaneous every 8 hours  influenza  Vaccine (HIGH DOSE) 0.7 milliLiter(s) IntraMuscular once  levothyroxine 175 MICROGram(s) Oral daily  lisinopril 5 milliGRAM(s) Oral daily  multivitamin 1 Tablet(s) Oral daily  sevelamer carbonate 1600 milliGRAM(s) Oral three times a day with meals  sodium zirconium cyclosilicate 10 Gram(s) Oral <User Schedule>    PRN Inpatient Medications  acetaminophen     Tablet .. 650 milliGRAM(s) Oral every 6 hours PRN  aluminum hydroxide/magnesium hydroxide/simethicone Suspension 30 milliLiter(s) Oral every 4 hours PRN  haloperidol     Tablet 2.5 milliGRAM(s) Oral every 6 hours PRN  haloperidol    Injectable 2.5 milliGRAM(s) IntraMuscular every 6 hours PRN  lidocaine/prilocaine Cream 1 Application(s) Topical daily PRN  melatonin 3 milliGRAM(s) Oral at bedtime PRN  ondansetron Injectable 4 milliGRAM(s) IV Push every 8 hours PRN  sodium chloride 0.9% Bolus. 100 milliLiter(s) IV Bolus every 5 minutes PRN  zolpidem 5 milliGRAM(s) Oral at bedtime PRN      REVIEW OF SYSTEMS  --------------------------------------------------------------------------------  See HPI    VITALS/PHYSICAL EXAM  --------------------------------------------------------------------------------  T(C): 36.8 (12-20-23 @ 06:30), Max: 36.8 (12-19-23 @ 14:33)  HR: 81 (12-20-23 @ 06:30) (79 - 88)  BP: 140/67 (12-20-23 @ 06:30) (115/86 - 172/75)  RR: 17 (12-20-23 @ 06:30) (17 - 17)  SpO2: 96% (12-20-23 @ 05:30) (93% - 98%)  Wt(kg): --    Physical Exam:  Gen NAD  HEENT: no JVD  Pulm: CTABL  CV: S1S2,  Abd: Soft,   Ext:  - edema B/L LE   Neuro: Awake and alert  Skin: Warm and dry  Vascular:  +AVF being used for HD    LABS/STUDIES  --------------------------------------------------------------------------------              10.4   7.13  >-----------<  414      [12-20-23 @ 06:00]              30.6     137  |  98  |  48  ----------------------------<  106      [12-20-23 @ 06:00]  4.0   |  27  |  5.47        Ca     9.3     [12-20-23 @ 06:00]      Mg     2.30     [12-20-23 @ 06:00]      Phos  5.1     [12-20-23 @ 06:00]    Creatinine Trend:  SCr 5.47 [12-20 @ 06:00]  SCr 5.07 [12-19 @ 11:16]  SCr 7.56 [12-18 @ 05:41]  SCr 6.68 [12-17 @ 11:39]    Iron 139, TIBC 203, %sat 68      [12-20-23 @ 06:00]  Ferritin 376      [12-20-23 @ 06:00]  TSH 0.42      [12-17-23 @ 11:39]  Lipid: chol 111, , HDL 36, LDL --      [12-18-23 @ 05:41]    HBsAb <3.0      [12-18-23 @ 21:50]  HBsAg Nonreact      [12-18-23 @ 21:50]  HCV 0.06, Nonreact      [12-18-23 @ 05:41] [facility] Division of Kidney Diseases & Hypertension  FOLLOW UP NOTE  [phone]--------------------------------------------------------------------------------    Chief Complaint: ESRD/Ongoing hemodialysis requirement    24 hour events/subjective: Pt. was seen and evaluated in the HD unit receiving HD. Vitals stable, tolerating well. Pt. reports feeling well, offers no complaints. States he is being discharged today. Denies any headaches, fevers/chills, chest pain, SOB, and LE edema.      PAST HISTORY  --------------------------------------------------------------------------------  No significant changes to PMH, PSH, FHx, SHx, unless otherwise noted    ALLERGIES & MEDICATIONS  --------------------------------------------------------------------------------  Allergies    No Known Allergies    Intolerances      Standing Inpatient Medications  aspirin  chewable 81 milliGRAM(s) Oral daily  atorvastatin 40 milliGRAM(s) Oral at bedtime  chlorhexidine 2% Cloths 1 Application(s) Topical daily  epoetin evan (EPOGEN) Injectable 4000 Unit(s) IV Push <User Schedule>  escitalopram 10 milliGRAM(s) Oral daily  finasteride 5 milliGRAM(s) Oral daily  heparin   Injectable 5000 Unit(s) SubCutaneous every 8 hours  influenza  Vaccine (HIGH DOSE) 0.7 milliLiter(s) IntraMuscular once  levothyroxine 175 MICROGram(s) Oral daily  lisinopril 5 milliGRAM(s) Oral daily  multivitamin 1 Tablet(s) Oral daily  sevelamer carbonate 1600 milliGRAM(s) Oral three times a day with meals  sodium zirconium cyclosilicate 10 Gram(s) Oral <User Schedule>    PRN Inpatient Medications  acetaminophen     Tablet .. 650 milliGRAM(s) Oral every 6 hours PRN  aluminum hydroxide/magnesium hydroxide/simethicone Suspension 30 milliLiter(s) Oral every 4 hours PRN  haloperidol     Tablet 2.5 milliGRAM(s) Oral every 6 hours PRN  haloperidol    Injectable 2.5 milliGRAM(s) IntraMuscular every 6 hours PRN  lidocaine/prilocaine Cream 1 Application(s) Topical daily PRN  melatonin 3 milliGRAM(s) Oral at bedtime PRN  ondansetron Injectable 4 milliGRAM(s) IV Push every 8 hours PRN  sodium chloride 0.9% Bolus. 100 milliLiter(s) IV Bolus every 5 minutes PRN  zolpidem 5 milliGRAM(s) Oral at bedtime PRN      REVIEW OF SYSTEMS  --------------------------------------------------------------------------------  See HPI    VITALS/PHYSICAL EXAM  --------------------------------------------------------------------------------  T(C): 36.8 (12-20-23 @ 06:30), Max: 36.8 (12-19-23 @ 14:33)  HR: 81 (12-20-23 @ 06:30) (79 - 88)  BP: 140/67 (12-20-23 @ 06:30) (115/86 - 172/75)  RR: 17 (12-20-23 @ 06:30) (17 - 17)  SpO2: 96% (12-20-23 @ 05:30) (93% - 98%)  Wt(kg): --    Physical Exam:  Gen NAD  HEENT: no JVD  Pulm: CTABL  CV: S1S2,  Abd: Soft,   Ext:  - edema B/L LE   Neuro: Awake and alert  Skin: Warm and dry  Vascular:  +AVF being used for HD    LABS/STUDIES  --------------------------------------------------------------------------------              10.4   7.13  >-----------<  414      [12-20-23 @ 06:00]              30.6     137  |  98  |  48  ----------------------------<  106      [12-20-23 @ 06:00]  4.0   |  27  |  5.47        Ca     9.3     [12-20-23 @ 06:00]      Mg     2.30     [12-20-23 @ 06:00]      Phos  5.1     [12-20-23 @ 06:00]    Creatinine Trend:  SCr 5.47 [12-20 @ 06:00]  SCr 5.07 [12-19 @ 11:16]  SCr 7.56 [12-18 @ 05:41]  SCr 6.68 [12-17 @ 11:39]    Iron 139, TIBC 203, %sat 68      [12-20-23 @ 06:00]  Ferritin 376      [12-20-23 @ 06:00]  TSH 0.42      [12-17-23 @ 11:39]  Lipid: chol 111, , HDL 36, LDL --      [12-18-23 @ 05:41]    HBsAb <3.0      [12-18-23 @ 21:50]  HBsAg Nonreact      [12-18-23 @ 21:50]  HCV 0.06, Nonreact      [12-18-23 @ 05:41]

## 2023-12-20 NOTE — DISCHARGE NOTE PROVIDER - ATTENDING DISCHARGE PHYSICAL EXAMINATION:
.  VITAL SIGNS:  T(C): 36.3 (12-21-23 @ 05:15), Max: 36.9 (12-20-23 @ 15:32)  T(F): 97.4 (12-21-23 @ 05:15), Max: 98.5 (12-20-23 @ 15:32)  HR: 86 (12-21-23 @ 05:15) (65 - 91)  BP: 142/66 (12-21-23 @ 05:15) (111/63 - 142/66)  BP(mean): --  RR: 18 (12-21-23 @ 05:15) (17 - 18)  SpO2: 97% (12-21-23 @ 05:15) (97% - 99%)  Wt(kg): --    PHYSICAL EXAM:    Constitutional: Comfortable and resting in bed   Head: NC/AT  Eyes: PERRL, EOMI, anicteric sclera  ENT: no nasal discharge; uvula midline, no oropharyngeal erythema or exudates; MMM  Neck: supple; no JVD or thyromegaly  Respiratory: CTA B/L; no W/R/R, no retractions  Cardiac: +S1/S2; RRR; no M/R/G; PMI non-displaced  Gastrointestinal: abdomen soft, NT/ND; no rebound or guarding; +BSx4  Back: spine midline, no bony tenderness or step-offs; no CVAT B/L  Extremities: WWP, no clubbing or cyanosis; no peripheral edema  Musculoskeletal: NROM x4; no joint swelling, tenderness or erythema  Vascular: 2+ radial, femoral, DP/PT pulses B/L  Dermatologic: skin warm, dry and intact; no rashes, wounds, or scars  Lymphatic: no submandibular or cervical LAD  Neurologic: AAOx3; CNII-XII grossly intact; no focal deficits  Psychiatric: affect and characteristics of appearance, verbalizations, behaviors are appropriate

## 2023-12-20 NOTE — DISCHARGE NOTE PROVIDER - NSDCCPCAREPLAN_GEN_ALL_CORE_FT
PRINCIPAL DISCHARGE DIAGNOSIS  Diagnosis: Major depression  Assessment and Plan of Treatment: You have a history of depression. Psych saw you when you were in the hospital. [facility]      SECONDARY DISCHARGE DIAGNOSES  Diagnosis: HTN (hypertension)  Assessment and Plan of Treatment: You were found to have high blood pressure in the hospital. You do not take any medications at home. We started you on lisinopril 2.5mg once a day. Please take that medication and monitor your blood pressure. Follow up with your nephrologist outside of the hospital.     PRINCIPAL DISCHARGE DIAGNOSIS  Diagnosis: Major depression  Assessment and Plan of Treatment: You have a history of depression. Psych saw you when you were in the hospital. [facility]      SECONDARY DISCHARGE DIAGNOSES  Diagnosis: HTN (hypertension)  Assessment and Plan of Treatment: You were found to have high blood pressure in the hospital. You do not take any medications at home. We started you on lisinopril 2.5mg once a day. Please take that medication and monitor your blood pressure. Follow up with your nephrologist outside of the hospital.

## 2023-12-20 NOTE — DISCHARGE NOTE PROVIDER - NSDCFUADDAPPT_GEN_ALL_CORE_FT
Follow up with Brennan at [facility].   Follow up with nephrology for continued dialysis 3x weekly as scheduled.  Follow up with Brennan at [facility].   Follow up with nephrology for continued dialysis 3x weekly as scheduled.

## 2023-12-20 NOTE — PROGRESS NOTE ADULT - NSPROGADDITIONALINFOA_GEN_ALL_CORE
Discussed plan with daughter via telephone on 12/20. Expressed understanding regarding monitoring blood pressure and stabilizing the pressure prior to going to inpatient psych.

## 2023-12-20 NOTE — BH CONSULTATION LIAISON PROGRESS NOTE - NSBHATTESTBILLING_PSY_A_CORE
96526-[id number] OBS or IP - moderate complexity OR 35-49 mins [id number] OBS or IP - moderate complexity OR 35-49 mins

## 2023-12-21 ENCOUNTER — TRANSCRIPTION ENCOUNTER (OUTPATIENT)
Age: 78
End: 2023-12-21

## 2023-12-21 VITALS
SYSTOLIC BLOOD PRESSURE: 156 MMHG | OXYGEN SATURATION: 99 % | TEMPERATURE: 98 F | HEART RATE: 90 BPM | DIASTOLIC BLOOD PRESSURE: 69 MMHG | RESPIRATION RATE: 18 BRPM

## 2023-12-21 LAB
ANION GAP SERPL CALC-SCNC: 14 MMOL/L — SIGNIFICANT CHANGE UP (ref 7–14)
ANION GAP SERPL CALC-SCNC: 14 MMOL/L — SIGNIFICANT CHANGE UP (ref 7–14)
BASOPHILS # BLD AUTO: 0.05 K/UL — SIGNIFICANT CHANGE UP (ref 0–0.2)
BASOPHILS # BLD AUTO: 0.05 K/UL — SIGNIFICANT CHANGE UP (ref 0–0.2)
BASOPHILS NFR BLD AUTO: 0.5 % — SIGNIFICANT CHANGE UP (ref 0–2)
BASOPHILS NFR BLD AUTO: 0.5 % — SIGNIFICANT CHANGE UP (ref 0–2)
BUN SERPL-MCNC: 45 MG/DL — HIGH (ref 7–23)
BUN SERPL-MCNC: 45 MG/DL — HIGH (ref 7–23)
CALCIUM SERPL-MCNC: 9.5 MG/DL — SIGNIFICANT CHANGE UP (ref 8.4–10.5)
CALCIUM SERPL-MCNC: 9.5 MG/DL — SIGNIFICANT CHANGE UP (ref 8.4–10.5)
CHLORIDE SERPL-SCNC: 96 MMOL/L — LOW (ref 98–107)
CHLORIDE SERPL-SCNC: 96 MMOL/L — LOW (ref 98–107)
CO2 SERPL-SCNC: 27 MMOL/L — SIGNIFICANT CHANGE UP (ref 22–31)
CO2 SERPL-SCNC: 27 MMOL/L — SIGNIFICANT CHANGE UP (ref 22–31)
CREAT SERPL-MCNC: 5.64 MG/DL — HIGH (ref 0.5–1.3)
CREAT SERPL-MCNC: 5.64 MG/DL — HIGH (ref 0.5–1.3)
EGFR: 10 ML/MIN/1.73M2 — LOW
EGFR: 10 ML/MIN/1.73M2 — LOW
EOSINOPHIL # BLD AUTO: 0.17 K/UL — SIGNIFICANT CHANGE UP (ref 0–0.5)
EOSINOPHIL # BLD AUTO: 0.17 K/UL — SIGNIFICANT CHANGE UP (ref 0–0.5)
EOSINOPHIL NFR BLD AUTO: 1.6 % — SIGNIFICANT CHANGE UP (ref 0–6)
EOSINOPHIL NFR BLD AUTO: 1.6 % — SIGNIFICANT CHANGE UP (ref 0–6)
GLUCOSE SERPL-MCNC: 103 MG/DL — HIGH (ref 70–99)
GLUCOSE SERPL-MCNC: 103 MG/DL — HIGH (ref 70–99)
HCT VFR BLD CALC: 33.2 % — LOW (ref 39–50)
HCT VFR BLD CALC: 33.2 % — LOW (ref 39–50)
HGB BLD-MCNC: 10.8 G/DL — LOW (ref 13–17)
HGB BLD-MCNC: 10.8 G/DL — LOW (ref 13–17)
IANC: 7.71 K/UL — HIGH (ref 1.8–7.4)
IANC: 7.71 K/UL — HIGH (ref 1.8–7.4)
IMM GRANULOCYTES NFR BLD AUTO: 0.5 % — SIGNIFICANT CHANGE UP (ref 0–0.9)
IMM GRANULOCYTES NFR BLD AUTO: 0.5 % — SIGNIFICANT CHANGE UP (ref 0–0.9)
LYMPHOCYTES # BLD AUTO: 1.73 K/UL — SIGNIFICANT CHANGE UP (ref 1–3.3)
LYMPHOCYTES # BLD AUTO: 1.73 K/UL — SIGNIFICANT CHANGE UP (ref 1–3.3)
LYMPHOCYTES # BLD AUTO: 16.4 % — SIGNIFICANT CHANGE UP (ref 13–44)
LYMPHOCYTES # BLD AUTO: 16.4 % — SIGNIFICANT CHANGE UP (ref 13–44)
MAGNESIUM SERPL-MCNC: 2.4 MG/DL — SIGNIFICANT CHANGE UP (ref 1.6–2.6)
MAGNESIUM SERPL-MCNC: 2.4 MG/DL — SIGNIFICANT CHANGE UP (ref 1.6–2.6)
MCHC RBC-ENTMCNC: 32.5 GM/DL — SIGNIFICANT CHANGE UP (ref 32–36)
MCHC RBC-ENTMCNC: 32.5 GM/DL — SIGNIFICANT CHANGE UP (ref 32–36)
MCHC RBC-ENTMCNC: 33.4 PG — SIGNIFICANT CHANGE UP (ref 27–34)
MCHC RBC-ENTMCNC: 33.4 PG — SIGNIFICANT CHANGE UP (ref 27–34)
MCV RBC AUTO: 102.8 FL — HIGH (ref 80–100)
MCV RBC AUTO: 102.8 FL — HIGH (ref 80–100)
MONOCYTES # BLD AUTO: 0.86 K/UL — SIGNIFICANT CHANGE UP (ref 0–0.9)
MONOCYTES # BLD AUTO: 0.86 K/UL — SIGNIFICANT CHANGE UP (ref 0–0.9)
MONOCYTES NFR BLD AUTO: 8.1 % — SIGNIFICANT CHANGE UP (ref 2–14)
MONOCYTES NFR BLD AUTO: 8.1 % — SIGNIFICANT CHANGE UP (ref 2–14)
NEUTROPHILS # BLD AUTO: 7.71 K/UL — HIGH (ref 1.8–7.4)
NEUTROPHILS # BLD AUTO: 7.71 K/UL — HIGH (ref 1.8–7.4)
NEUTROPHILS NFR BLD AUTO: 72.9 % — SIGNIFICANT CHANGE UP (ref 43–77)
NEUTROPHILS NFR BLD AUTO: 72.9 % — SIGNIFICANT CHANGE UP (ref 43–77)
NRBC # BLD: 0 /100 WBCS — SIGNIFICANT CHANGE UP (ref 0–0)
NRBC # BLD: 0 /100 WBCS — SIGNIFICANT CHANGE UP (ref 0–0)
NRBC # FLD: 0 K/UL — SIGNIFICANT CHANGE UP (ref 0–0)
NRBC # FLD: 0 K/UL — SIGNIFICANT CHANGE UP (ref 0–0)
PHOSPHATE SERPL-MCNC: 5.7 MG/DL — HIGH (ref 2.5–4.5)
PHOSPHATE SERPL-MCNC: 5.7 MG/DL — HIGH (ref 2.5–4.5)
PLATELET # BLD AUTO: 369 K/UL — SIGNIFICANT CHANGE UP (ref 150–400)
PLATELET # BLD AUTO: 369 K/UL — SIGNIFICANT CHANGE UP (ref 150–400)
POTASSIUM SERPL-MCNC: 4.5 MMOL/L — SIGNIFICANT CHANGE UP (ref 3.5–5.3)
POTASSIUM SERPL-MCNC: 4.5 MMOL/L — SIGNIFICANT CHANGE UP (ref 3.5–5.3)
POTASSIUM SERPL-SCNC: 4.5 MMOL/L — SIGNIFICANT CHANGE UP (ref 3.5–5.3)
POTASSIUM SERPL-SCNC: 4.5 MMOL/L — SIGNIFICANT CHANGE UP (ref 3.5–5.3)
RBC # BLD: 3.23 M/UL — LOW (ref 4.2–5.8)
RBC # BLD: 3.23 M/UL — LOW (ref 4.2–5.8)
RBC # FLD: 12.2 % — SIGNIFICANT CHANGE UP (ref 10.3–14.5)
RBC # FLD: 12.2 % — SIGNIFICANT CHANGE UP (ref 10.3–14.5)
SODIUM SERPL-SCNC: 137 MMOL/L — SIGNIFICANT CHANGE UP (ref 135–145)
SODIUM SERPL-SCNC: 137 MMOL/L — SIGNIFICANT CHANGE UP (ref 135–145)
WBC # BLD: 10.57 K/UL — HIGH (ref 3.8–10.5)
WBC # BLD: 10.57 K/UL — HIGH (ref 3.8–10.5)
WBC # FLD AUTO: 10.57 K/UL — HIGH (ref 3.8–10.5)
WBC # FLD AUTO: 10.57 K/UL — HIGH (ref 3.8–10.5)

## 2023-12-21 PROCEDURE — 99232 SBSQ HOSP IP/OBS MODERATE 35: CPT

## 2023-12-21 PROCEDURE — 99239 HOSP IP/OBS DSCHRG MGMT >30: CPT

## 2023-12-21 RX ORDER — ESCITALOPRAM OXALATE 10 MG/1
1 TABLET, FILM COATED ORAL
Refills: 0 | DISCHARGE

## 2023-12-21 RX ORDER — SUCROFERRIC OXYHYDROXIDE 500 MG/1
2 TABLET, CHEWABLE ORAL
Refills: 0 | DISCHARGE

## 2023-12-21 RX ORDER — ZOLPIDEM TARTRATE 10 MG/1
5 TABLET ORAL AT BEDTIME
Refills: 0 | Status: DISCONTINUED | OUTPATIENT
Start: 2023-12-21 | End: 2023-12-21

## 2023-12-21 RX ORDER — LISINOPRIL 2.5 MG/1
1 TABLET ORAL
Qty: 0 | Refills: 0 | DISCHARGE
Start: 2023-12-21

## 2023-12-21 RX ORDER — SODIUM ZIRCONIUM CYCLOSILICATE 10 G/10G
10 POWDER, FOR SUSPENSION ORAL
Qty: 0 | Refills: 0 | DISCHARGE
Start: 2023-12-21

## 2023-12-21 RX ORDER — PATIROMER 8.4 G/1
8.4 POWDER, FOR SUSPENSION ORAL
Refills: 0 | DISCHARGE

## 2023-12-21 RX ORDER — SEVELAMER CARBONATE 2400 MG/1
2 POWDER, FOR SUSPENSION ORAL
Qty: 0 | Refills: 0 | DISCHARGE
Start: 2023-12-21

## 2023-12-21 RX ORDER — ESCITALOPRAM OXALATE 10 MG/1
1 TABLET, FILM COATED ORAL
Qty: 0 | Refills: 0 | DISCHARGE
Start: 2023-12-21

## 2023-12-21 RX ORDER — LANOLIN ALCOHOL/MO/W.PET/CERES
1 CREAM (GRAM) TOPICAL
Qty: 0 | Refills: 0 | DISCHARGE
Start: 2023-12-21

## 2023-12-21 RX ORDER — BREXPIPRAZOLE 0.25 MG/1
1 TABLET ORAL
Refills: 0 | DISCHARGE

## 2023-12-21 RX ORDER — LIDOCAINE AND PRILOCAINE CREAM 25; 25 MG/G; MG/G
1 CREAM TOPICAL
Qty: 0 | Refills: 0 | DISCHARGE
Start: 2023-12-21

## 2023-12-21 RX ORDER — ZOLPIDEM TARTRATE 10 MG/1
1 TABLET ORAL
Refills: 0 | DISCHARGE

## 2023-12-21 RX ORDER — ZOLPIDEM TARTRATE 10 MG/1
1 TABLET ORAL
Qty: 0 | Refills: 0 | DISCHARGE
Start: 2023-12-21

## 2023-12-21 RX ADMIN — HEPARIN SODIUM 5000 UNIT(S): 5000 INJECTION INTRAVENOUS; SUBCUTANEOUS at 05:25

## 2023-12-21 RX ADMIN — SEVELAMER CARBONATE 1600 MILLIGRAM(S): 2400 POWDER, FOR SUSPENSION ORAL at 12:17

## 2023-12-21 RX ADMIN — LISINOPRIL 2.5 MILLIGRAM(S): 2.5 TABLET ORAL at 05:25

## 2023-12-21 RX ADMIN — ESCITALOPRAM OXALATE 10 MILLIGRAM(S): 10 TABLET, FILM COATED ORAL at 12:16

## 2023-12-21 RX ADMIN — Medication 81 MILLIGRAM(S): at 12:16

## 2023-12-21 RX ADMIN — Medication 175 MICROGRAM(S): at 05:24

## 2023-12-21 RX ADMIN — CHLORHEXIDINE GLUCONATE 1 APPLICATION(S): 213 SOLUTION TOPICAL at 12:17

## 2023-12-21 RX ADMIN — SEVELAMER CARBONATE 1600 MILLIGRAM(S): 2400 POWDER, FOR SUSPENSION ORAL at 08:13

## 2023-12-21 RX ADMIN — Medication 1 TABLET(S): at 12:17

## 2023-12-21 RX ADMIN — FINASTERIDE 5 MILLIGRAM(S): 5 TABLET, FILM COATED ORAL at 12:16

## 2023-12-21 RX ADMIN — HEPARIN SODIUM 5000 UNIT(S): 5000 INJECTION INTRAVENOUS; SUBCUTANEOUS at 13:06

## 2023-12-21 RX ADMIN — SODIUM ZIRCONIUM CYCLOSILICATE 10 GRAM(S): 10 POWDER, FOR SUSPENSION ORAL at 08:12

## 2023-12-21 NOTE — BH CONSULTATION LIAISON PROGRESS NOTE - CURRENT MEDICATION
MEDICATIONS  (STANDING):  aspirin  chewable 81 milliGRAM(s) Oral daily  atorvastatin 40 milliGRAM(s) Oral at bedtime  chlorhexidine 2% Cloths 1 Application(s) Topical daily  epoetin evan (EPOGEN) Injectable 4000 Unit(s) IV Push <User Schedule>  escitalopram 10 milliGRAM(s) Oral daily  finasteride 5 milliGRAM(s) Oral daily  heparin   Injectable 5000 Unit(s) SubCutaneous every 8 hours  influenza  Vaccine (HIGH DOSE) 0.7 milliLiter(s) IntraMuscular once  levothyroxine 175 MICROGram(s) Oral daily  lisinopril 2.5 milliGRAM(s) Oral daily  multivitamin 1 Tablet(s) Oral daily  sevelamer carbonate 1600 milliGRAM(s) Oral three times a day with meals  sodium zirconium cyclosilicate 10 Gram(s) Oral <User Schedule>    MEDICATIONS  (PRN):  acetaminophen     Tablet .. 650 milliGRAM(s) Oral every 6 hours PRN Temp greater or equal to 38C (100.4F), Mild Pain (1 - 3)  aluminum hydroxide/magnesium hydroxide/simethicone Suspension 30 milliLiter(s) Oral every 4 hours PRN Dyspepsia  haloperidol     Tablet 2.5 milliGRAM(s) Oral every 6 hours PRN agitation  haloperidol    Injectable 2.5 milliGRAM(s) IntraMuscular every 6 hours PRN Agitation  lidocaine/prilocaine Cream 1 Application(s) Topical daily PRN w dialysis  melatonin 3 milliGRAM(s) Oral at bedtime PRN Insomnia  ondansetron Injectable 4 milliGRAM(s) IV Push every 8 hours PRN Nausea and/or Vomiting  sodium chloride 0.9% Bolus. 100 milliLiter(s) IV Bolus every 5 minutes PRN SBP LESS THAN or EQUAL to 90 mmHg  zolpidem 5 milliGRAM(s) Oral at bedtime PRN Insomnia  
MEDICATIONS  (STANDING):  aspirin  chewable 81 milliGRAM(s) Oral daily  atorvastatin 40 milliGRAM(s) Oral at bedtime  chlorhexidine 2% Cloths 1 Application(s) Topical daily  epoetin evan (EPOGEN) Injectable 4000 Unit(s) IV Push <User Schedule>  escitalopram 10 milliGRAM(s) Oral daily  finasteride 5 milliGRAM(s) Oral daily  heparin   Injectable 5000 Unit(s) SubCutaneous every 8 hours  influenza  Vaccine (HIGH DOSE) 0.7 milliLiter(s) IntraMuscular once  levothyroxine 175 MICROGram(s) Oral daily  multivitamin 1 Tablet(s) Oral daily  sevelamer carbonate 1600 milliGRAM(s) Oral three times a day with meals  sodium zirconium cyclosilicate 10 Gram(s) Oral <User Schedule>    MEDICATIONS  (PRN):  acetaminophen     Tablet .. 650 milliGRAM(s) Oral every 6 hours PRN Temp greater or equal to 38C (100.4F), Mild Pain (1 - 3)  aluminum hydroxide/magnesium hydroxide/simethicone Suspension 30 milliLiter(s) Oral every 4 hours PRN Dyspepsia  haloperidol     Tablet 2.5 milliGRAM(s) Oral every 6 hours PRN agitation  haloperidol    Injectable 2.5 milliGRAM(s) IntraMuscular every 6 hours PRN Agitation  lidocaine/prilocaine Cream 1 Application(s) Topical daily PRN w dialysis  melatonin 3 milliGRAM(s) Oral at bedtime PRN Insomnia  ondansetron Injectable 4 milliGRAM(s) IV Push every 8 hours PRN Nausea and/or Vomiting  sodium chloride 0.9% Bolus. 100 milliLiter(s) IV Bolus every 5 minutes PRN SBP LESS THAN or EQUAL to 90 mmHg  zolpidem 5 milliGRAM(s) Oral at bedtime PRN Insomnia  
MEDICATIONS  (STANDING):  aspirin  chewable 81 milliGRAM(s) Oral daily  atorvastatin 40 milliGRAM(s) Oral at bedtime  chlorhexidine 2% Cloths 1 Application(s) Topical daily  epoetin evan (EPOGEN) Injectable 4000 Unit(s) IV Push <User Schedule>  escitalopram 10 milliGRAM(s) Oral daily  escitalopram 10 milliGRAM(s) Oral daily  finasteride 5 milliGRAM(s) Oral daily  heparin   Injectable 5000 Unit(s) SubCutaneous every 8 hours  influenza  Vaccine (HIGH DOSE) 0.7 milliLiter(s) IntraMuscular once  levothyroxine 175 MICROGram(s) Oral daily  multivitamin 1 Tablet(s) Oral daily  PARoxetine 5 milliGRAM(s) Oral daily  sevelamer carbonate 1600 milliGRAM(s) Oral three times a day with meals  sodium zirconium cyclosilicate 10 Gram(s) Oral <User Schedule>    MEDICATIONS  (PRN):  acetaminophen     Tablet .. 650 milliGRAM(s) Oral every 6 hours PRN Temp greater or equal to 38C (100.4F), Mild Pain (1 - 3)  aluminum hydroxide/magnesium hydroxide/simethicone Suspension 30 milliLiter(s) Oral every 4 hours PRN Dyspepsia  haloperidol     Tablet 2.5 milliGRAM(s) Oral every 6 hours PRN agitation  haloperidol    Injectable 2.5 milliGRAM(s) IntraMuscular every 6 hours PRN Agitation  melatonin 3 milliGRAM(s) Oral at bedtime PRN Insomnia  ondansetron Injectable 4 milliGRAM(s) IV Push every 8 hours PRN Nausea and/or Vomiting  sodium chloride 0.9% Bolus. 100 milliLiter(s) IV Bolus every 5 minutes PRN SBP LESS THAN or EQUAL to 90 mmHg  zolpidem 5 milliGRAM(s) Oral at bedtime PRN Insomnia

## 2023-12-21 NOTE — BH CONSULTATION LIAISON PROGRESS NOTE - NSBHATTESTBILLING_PSY_A_CORE
[id number] OBS or IP - moderate complexity OR 35-49 mins [id number] OBS or IP - moderate complexity OR 35-49 mins

## 2023-12-21 NOTE — BH CONSULTATION LIAISON PROGRESS NOTE - NSBHCHARTREVIEWLAB_PSY_A_CORE FT
10.4   7.13  )-----------( 414      ( 20 Dec 2023 06:00 )             30.6   12-20    137  |  98  |  48<H>  ----------------------------<  106<H>  4.0   |  27  |  5.47<H>    Ca    9.3      20 Dec 2023 06:00  Phos  5.1     12-20  Mg     2.30     12-20    
                      10.8   10.57 )-----------( 369      ( 21 Dec 2023 06:08 )             33.2   12-21    137  |  96<L>  |  45<H>  ----------------------------<  103<H>  4.5   |  27  |  5.64<H>    Ca    9.5      21 Dec 2023 06:08  Phos  5.7     12-21  Mg     2.40     12-21

## 2023-12-21 NOTE — BH CONSULTATION LIAISON PROGRESS NOTE - NSBHFUPINTERVALHXFT_PSY_A_CORE
Patient states he continues to be depressed. He denies SI. He remains in agreement with transfer to inpatient psych. Hopes that he can be transferred today. Is in agreement with increasing lexapro.   States that in the past he tried rexulti but doesn't remember if it was effective.
Chart reviewed. Patient initially considering to rescind his inpatient voluntary request, however, this AM he notes that he did wake up feeling more hesitant to go home and acknowledges that he is not right. He expressed frustration with how he had been treated the past couple of days- waiting for a bed, not getting psychotherapy, but also notes that this is not an inpatient psychiatric facility. He continues to feel depressed and anxious. Denies active SI but thinks about not being alive. Spoke with patient and daughter- both are in agreement to pursue voluntary psychiatric admission which will help jump start his recovery.
Chart reviewed. Pt seen with daughter. AA O x 3, dysphoric/depressed-appearing. Says he needs help for depression/amotivation. Wants to get better, forward-thinking. Asking about programs vs inpt care. Open to psych unit with HD capabilities. Denies hopelessness, but endorses periodic passive SI without plan/intent. Denies HI, AVH, paranoia. No focal neuro deficits noted.

## 2023-12-21 NOTE — PROGRESS NOTE ADULT - PROBLEM SELECTOR PLAN 2
Chronic stable  HD on MWF  - Renal consulted
Pt. with anemia in the setting of ESRD, receives EPO at outpatient HD center. Hgb 10.4 . EPO started 4K units TIW 12/18. Monitor hgb, goal: 10-11.
Chronic stable  HD on MWF  - Renal consulted
Chronic stable  HD on MWF  - Renal consulted - dialysis today
Pt. with anemia in the setting of ESRD, receives EPO at outpatient HD center. Hgb 10.5 . EPO started 4K units TIW 12/18. Check iron panel including ferritin. (ordered) Monitor hgb, goal: 10-11.
Chronic stable  HD on MWF  - Renal consulted

## 2023-12-21 NOTE — DISCHARGE NOTE NURSING/CASE MANAGEMENT/SOCIAL WORK - NSDCFUADDAPPT_GEN_ALL_CORE_FT
Follow up with Brennan at [facility].   Follow up with nephrology for continued dialysis 3x weekly as scheduled.  Follow up with Brennan at [facility].   Follow up with nephrology for continued dialysis 3x weekly as scheduled.

## 2023-12-21 NOTE — PROGRESS NOTE ADULT - ASSESSMENT
78 yr old male presenting with a voluntary admission for progressive depression symptoms. Being admitted to HD due to HD needs
Pt. with ESRD on HD TIW
Pt. with ESRD on HD TIW
78 yr old male presenting with a voluntary admission for progressive depression symptoms. Being admitted to HD due to HD needs

## 2023-12-21 NOTE — PROGRESS NOTE ADULT - PROBLEM SELECTOR PLAN 3
BP ranging from 140 -190s prior to dialysis. Labile pressures. He received lisinopril 5mg yesterday evening at 6PM and this AM his systolic pressure was in the 90s. Pt. states he was asymptomatic at the time. HR went to 100s. EKG showing sinus tachycardia. Now HR and BP normalized. Received lisinopril 2.5mg this Am and now BP is better controlled. SBP 140s. HR 60-70s.   - C/w lisinopril 2.5mg qd - new med on this admission. Will need a new prescription when discharged from inpatient psych. BP ranging from 140 -190s prior to dialysis. Labile pressures. He received lisinopril 5mg yesterday 12/19 at  6PM and the following AM his systolic pressure was in the 90s. Pt. states he was asymptomatic at the time. HR went to 100s. EKG showing sinus tachycardia. Now HR and BP normalized. Received lisinopril 2.5mg this AM and now BP is better controlled. SBP 140s. HR 60-70s.   - C/w lisinopril 2.5mg qd - new med on this admission. Will need a new prescription when discharged from inpatient psych.

## 2023-12-21 NOTE — DISCHARGE NOTE NURSING/CASE MANAGEMENT/SOCIAL WORK - PATIENT PORTAL LINK FT
You can access the FollowMyHealth Patient Portal offered by [facility] by registering at the following website: [email or web address]. By joining [employer]’s FollowMyHealth portal, you will also be able to view your health information using other applications (apps) compatible with our system. You can access the FollowMyHealth Patient Portal offered by [facility] by registering at the following website: [email or web address]. By joining [email or web address]’s FollowMyHealth portal, you will also be able to view your health information using other applications (apps) compatible with our system.

## 2023-12-21 NOTE — PROGRESS NOTE ADULT - SUBJECTIVE AND OBJECTIVE BOX
Hospitalist Progress Note  Olimpia Rodriguez MD Pager # [phone]    OVERNIGHT EVENTS: LOTUS    SUBJECTIVE / INTERVAL HPI: Patient seen and examined at bedside. Pt. states he feels better today. No dizziness or lightheadedness. He is ready to go to inpatient psych.     VITAL SIGNS:  Vital Signs Last 24 Hrs  T(C): 36.3 (21 Dec 2023 05:15), Max: 36.9 (20 Dec 2023 15:32)  T(F): 97.4 (21 Dec 2023 05:15), Max: 98.5 (20 Dec 2023 15:32)  HR: 86 (21 Dec 2023 05:15) (65 - 102)  BP: 142/66 (21 Dec 2023 05:15) (102/59 - 142/66)  BP(mean): --  RR: 18 (21 Dec 2023 05:15) (17 - 18)  SpO2: 97% (21 Dec 2023 05:15) (97% - 99%)    Parameters below as of 21 Dec 2023 05:15  Patient On (Oxygen Delivery Method): room air        PHYSICAL EXAM:    General: Comfortable and resting in bed   HEENT: NC/AT; PERRL, anicteric sclera; MMM  Neck: supple  Cardiovascular: +S1/S2; RRR  Respiratory: CTA B/L; no W/R/R  Gastrointestinal: soft, NT/ND; +BSx4  Extremities: WWP; no edema, clubbing or cyanosis  Vascular: 2+ radial, DP/PT pulses B/L  Neurological: AAOx3; no focal deficits    MEDICATIONS:  MEDICATIONS  (STANDING):  aspirin  chewable 81 milliGRAM(s) Oral daily  atorvastatin 40 milliGRAM(s) Oral at bedtime  chlorhexidine 2% Cloths 1 Application(s) Topical daily  epoetin vean (EPOGEN) Injectable 4000 Unit(s) IV Push <User Schedule>  escitalopram 10 milliGRAM(s) Oral daily  finasteride 5 milliGRAM(s) Oral daily  heparin   Injectable 5000 Unit(s) SubCutaneous every 8 hours  influenza  Vaccine (HIGH DOSE) 0.7 milliLiter(s) IntraMuscular once  levothyroxine 175 MICROGram(s) Oral daily  lisinopril 2.5 milliGRAM(s) Oral daily  multivitamin 1 Tablet(s) Oral daily  sevelamer carbonate 1600 milliGRAM(s) Oral three times a day with meals  sodium zirconium cyclosilicate 10 Gram(s) Oral <User Schedule>    MEDICATIONS  (PRN):  acetaminophen     Tablet .. 650 milliGRAM(s) Oral every 6 hours PRN Temp greater or equal to 38C (100.4F), Mild Pain (1 - 3)  aluminum hydroxide/magnesium hydroxide/simethicone Suspension 30 milliLiter(s) Oral every 4 hours PRN Dyspepsia  haloperidol     Tablet 2.5 milliGRAM(s) Oral every 6 hours PRN agitation  haloperidol    Injectable 2.5 milliGRAM(s) IntraMuscular every 6 hours PRN Agitation  lidocaine/prilocaine Cream 1 Application(s) Topical daily PRN w dialysis  melatonin 3 milliGRAM(s) Oral at bedtime PRN Insomnia  ondansetron Injectable 4 milliGRAM(s) IV Push every 8 hours PRN Nausea and/or Vomiting  sodium chloride 0.9% Bolus. 100 milliLiter(s) IV Bolus every 5 minutes PRN SBP LESS THAN or EQUAL to 90 mmHg  zolpidem 5 milliGRAM(s) Oral at bedtime PRN Insomnia      ALLERGIES:  Allergies    No Known Allergies    Intolerances        LABS:                        10.8   10.57 )-----------( 369      ( 21 Dec 2023 06:08 )             33.2     12-21    137  |  96<L>  |  45<H>  ----------------------------<  103<H>  4.5   |  27  |  5.64<H>    Ca    9.5      21 Dec 2023 06:08  Phos  5.7     12-21  Mg     2.40     12-21        Urinalysis Basic - ( 21 Dec 2023 06:08 )    Color: x / Appearance: x / SG: x / pH: x  Gluc: 103 mg/dL / Ketone: x  / Bili: x / Urobili: x   Blood: x / Protein: x / Nitrite: x   Leuk Esterase: x / RBC: x / WBC x   Sq Epi: x / Non Sq Epi: x / Bacteria: x      CAPILLARY BLOOD GLUCOSE          RADIOLOGY & ADDITIONAL TESTS: Reviewed.    ASSESSMENT:    PLAN:  Hospitalist Progress Note  Olimpia Rodriguez MD Pager # [phone]    OVERNIGHT EVENTS: LOTUS    SUBJECTIVE / INTERVAL HPI: Patient seen and examined at bedside. Pt. states he feels better today. No dizziness or lightheadedness. He is ready to go to inpatient psych.     VITAL SIGNS:  Vital Signs Last 24 Hrs  T(C): 36.3 (21 Dec 2023 05:15), Max: 36.9 (20 Dec 2023 15:32)  T(F): 97.4 (21 Dec 2023 05:15), Max: 98.5 (20 Dec 2023 15:32)  HR: 86 (21 Dec 2023 05:15) (65 - 102)  BP: 142/66 (21 Dec 2023 05:15) (102/59 - 142/66)  BP(mean): --  RR: 18 (21 Dec 2023 05:15) (17 - 18)  SpO2: 97% (21 Dec 2023 05:15) (97% - 99%)    Parameters below as of 21 Dec 2023 05:15  Patient On (Oxygen Delivery Method): room air        PHYSICAL EXAM:    General: Comfortable and resting in bed   HEENT: NC/AT; PERRL, anicteric sclera; MMM  Neck: supple  Cardiovascular: +S1/S2; RRR  Respiratory: CTA B/L; no W/R/R  Gastrointestinal: soft, NT/ND; +BSx4  Extremities: WWP; no edema, clubbing or cyanosis  Vascular: 2+ radial, DP/PT pulses B/L  Neurological: AAOx3; no focal deficits    MEDICATIONS:  MEDICATIONS  (STANDING):  aspirin  chewable 81 milliGRAM(s) Oral daily  atorvastatin 40 milliGRAM(s) Oral at bedtime  chlorhexidine 2% Cloths 1 Application(s) Topical daily  epoetin evan (EPOGEN) Injectable 4000 Unit(s) IV Push <User Schedule>  escitalopram 10 milliGRAM(s) Oral daily  finasteride 5 milliGRAM(s) Oral daily  heparin   Injectable 5000 Unit(s) SubCutaneous every 8 hours  influenza  Vaccine (HIGH DOSE) 0.7 milliLiter(s) IntraMuscular once  levothyroxine 175 MICROGram(s) Oral daily  lisinopril 2.5 milliGRAM(s) Oral daily  multivitamin 1 Tablet(s) Oral daily  sevelamer carbonate 1600 milliGRAM(s) Oral three times a day with meals  sodium zirconium cyclosilicate 10 Gram(s) Oral <User Schedule>    MEDICATIONS  (PRN):  acetaminophen     Tablet .. 650 milliGRAM(s) Oral every 6 hours PRN Temp greater or equal to 38C (100.4F), Mild Pain (1 - 3)  aluminum hydroxide/magnesium hydroxide/simethicone Suspension 30 milliLiter(s) Oral every 4 hours PRN Dyspepsia  haloperidol     Tablet 2.5 milliGRAM(s) Oral every 6 hours PRN agitation  haloperidol    Injectable 2.5 milliGRAM(s) IntraMuscular every 6 hours PRN Agitation  lidocaine/prilocaine Cream 1 Application(s) Topical daily PRN w dialysis  melatonin 3 milliGRAM(s) Oral at bedtime PRN Insomnia  ondansetron Injectable 4 milliGRAM(s) IV Push every 8 hours PRN Nausea and/or Vomiting  sodium chloride 0.9% Bolus. 100 milliLiter(s) IV Bolus every 5 minutes PRN SBP LESS THAN or EQUAL to 90 mmHg  zolpidem 5 milliGRAM(s) Oral at bedtime PRN Insomnia      ALLERGIES:  Allergies    No Known Allergies    Intolerances        LABS:                        10.8   10.57 )-----------( 369      ( 21 Dec 2023 06:08 )             33.2     12-21    137  |  96<L>  |  45<H>  ----------------------------<  103<H>  4.5   |  27  |  5.64<H>    Ca    9.5      21 Dec 2023 06:08  Phos  5.7     12-21  Mg     2.40     12-21        Urinalysis Basic - ( 21 Dec 2023 06:08 )    Color: x / Appearance: x / SG: x / pH: x  Gluc: 103 mg/dL / Ketone: x  / Bili: x / Urobili: x   Blood: x / Protein: x / Nitrite: x   Leuk Esterase: x / RBC: x / WBC x   Sq Epi: x / Non Sq Epi: x / Bacteria: x      CAPILLARY BLOOD GLUCOSE          RADIOLOGY & ADDITIONAL TESTS: Reviewed.    ASSESSMENT:    PLAN:

## 2023-12-21 NOTE — DISCHARGE NOTE NURSING/CASE MANAGEMENT/SOCIAL WORK - NSDCCRNAME_GEN_ALL_CORE_FT
[facility]- [address], [place] [address] [facility]- [address], [place] [address]

## 2023-12-21 NOTE — DISCHARGE NOTE NURSING/CASE MANAGEMENT/SOCIAL WORK - NSDCPEFALRISK_GEN_ALL_CORE
For information on Fall & Injury Prevention, visit: [email or web address] OR  [email or web address] OR  https://www.cdc.gov/steadi/patient.html For information on Fall & Injury Prevention, visit: [email or web address] OR  [email or web address] OR  https://www.cdc.gov/steadi/patient.html

## 2023-12-21 NOTE — PROGRESS NOTE ADULT - PROBLEM SELECTOR PROBLEM 1
ESRD on hemodialysis
Depression, major, recurrent, moderate
ESRD on hemodialysis

## 2023-12-21 NOTE — PROGRESS NOTE ADULT - TIME BILLING
Review of laboratory data, radiology results, consultants' recommendations, documentation in [place], discussion with patient/advanced care providers and interdisciplinary staff (such as , social workers, etc). Interventions were performed as documented above. Review of laboratory data, radiology results, consultants' recommendations, documentation in [place], discussion with patient/advanced care providers and interdisciplinary staff (such as , social workers, etc). Interventions were performed as documented above.

## 2023-12-21 NOTE — BH CONSULTATION LIAISON PROGRESS NOTE - MSE UNSTRUCTURED FT
Mental Status Exam:  Jace: well groomed, fair hygiene     Behavior: calm, cooperative, no psychomotor retardation/agitation  Motor: no tremors, EPS, or rigidity  Gait: did not assess, pt in bed  Speech: normal rate, rhythm, prosody and volume   Mood: "not great"  Affect: dysthymic  Thought process: clear, goal directed, linear   Thought Content: denies paranoia, delusions; expressed concerns about mood   Perception: denies AH/VH  SI: denies  HI: denies  Insight: fair  Judgment: fair    Cognitive Exam:  Orientation: AOx3  Recall: intact  Attention: intact  Abstraction: intact  
AA O x 3, dysphoric/depressed-appearing. Says he needs help for depression/amotivation. Wants to get better, forward-thinking. Asking about programs vs inpt care. Open to psych unit with HD capabilities. Denies hopelessness, but endorses periodic passive SI without plan/intent. Denies HI, AVH, paranoia. No focal neuro deficits noted. 
Mental Status Exam:  Jace: well groomed, fair hygiene     Behavior: calm, cooperative, no psychomotor retardation/agitation  Motor: no tremors, EPS, or rigidity  Gait: did not assess, pt in bed  Speech: normal rate, rhythm, prosody and volume   Mood: "not great"  Affect: dysthymic  Thought process: clear, goal directed, linear   Thought Content: denies paranoia, delusions; expressed concerns about mood   Perception: denies AH/VH  SI: denies  HI: denies  Insight: fair  Judgment: fair    Cognitive Exam:  Orientation: AOx3  Recall: intact  Attention: intact  Abstraction: intact

## 2023-12-21 NOTE — BH CONSULTATION LIAISON PROGRESS NOTE - NSBHASSESSMENTFT_PSY_ALL_CORE
Pt is a 79 y/o male, , domiciled at [facility], with pphx of depression, no prior psychiatric hospitalizations but was evaluated at [facility] a few weeks ago and stayed overnight, no hx of SA, no hx of NSSIB, no hx of violence or arrests, no active substance use, w/ pmh significant for hx of lung cancer, hx of thyroid cancer, ESRD on hemodialysis Monday Wednesday Friday, and high cholesterol was admitted for depression     On assessment, patient endorses worsening depressive symptoms over the past 3-4 months including depressed mood, insomnia, anhedonia, guilt, low energy, poor concentration, and suicidal ideation. He reports passive suicidal ideation, . Collateral from patient's daughter was obtained in the ED is significant for recent breakup with patient's longtime partner who he used to live with.     12/19: depressed, with passive SI, but no intent/plan. Forward-thinking, wants care.   12/20: after more time, patient continues to acknowledge that he is depressed, anxious and not doing well and would like to pursue voluntary admission. I agree that he meets criteria for voluntary admission and would benefit from said admission. The fact that yesterday he was considering to no longer seek inpatient admission, but with further time thinking again became in his words "hesitant" I believe validates the severity of his depression which he is seeking help for.    12/21: Remains depressed and is awaiting transfer for voluntary psychiatric admission.    Plan:   -- Routine observation  --PLEASE REPEAT EKG, IF QTC < 500, INCREASE Lexapro 15mg daily   [] PAXIL NOW discontinued (12/20/2023)  -- Dispo: inpt psych with HD capabilities as well as CPAP capabilities (per SW- patient on CPAP) on voluntary basis ([facility] units include [facility], [place], [place]); transfer SW aware.   -- Will continue to follow Pt is a 79 y/o male, , domiciled at [facility], with pphx of depression, no prior psychiatric hospitalizations but was evaluated at [facility] a few weeks ago and stayed overnight, no hx of SA, no hx of NSSIB, no hx of violence or arrests, no active substance use, w/ pmh significant for hx of lung cancer, hx of thyroid cancer, ESRD on hemodialysis Monday Wednesday Friday, and high cholesterol was admitted for depression     On assessment, patient endorses worsening depressive symptoms over the past 3-4 months including depressed mood, insomnia, anhedonia, guilt, low energy, poor concentration, and suicidal ideation. He reports passive suicidal ideation, . Collateral from patient's daughter was obtained in the ED is significant for recent breakup with patient's longtime partner who he used to live with.     12/19: depressed, with passive SI, but no intent/plan. Forward-thinking, wants care.   12/20: after more time, patient continues to acknowledge that he is depressed, anxious and not doing well and would like to pursue voluntary admission. I agree that he meets criteria for voluntary admission and would benefit from said admission. The fact that yesterday he was considering to no longer seek inpatient admission, but with further time thinking again became in his words "hesitant" I believe validates the severity of his depression which he is seeking help for.    12/21: Remains depressed and is awaiting transfer for voluntary psychiatric admission.    Plan:   -- Routine observation  --PLEASE REPEAT EKG, IF QTC < 500, INCREASE Lexapro 15mg daily   [] PAXIL NOW discontinued (12/20/2023)  -- Dispo: inpt psych with HD capabilities as well as CPAP capabilities (per SW- patient on CPAP) on voluntary basis ([facility] units include [facility], [place], [place]); transfer SW aware.   -- Will continue to follow

## 2023-12-21 NOTE — PROGRESS NOTE ADULT - PROBLEM SELECTOR PROBLEM 4
Hyperkalemia
Acquired hypothyroidism
Hyperkalemia
Acquired hypothyroidism

## 2023-12-21 NOTE — PROGRESS NOTE ADULT - PROBLEM SELECTOR PROBLEM 3
Hyperphosphatemia
Elevated blood pressure reading without diagnosis of hypertension
Elevated blood pressure reading without diagnosis of hypertension
Hyperphosphatemia
Elevated blood pressure reading without diagnosis of hypertension
Elevated blood pressure reading without diagnosis of hypertension

## 2023-12-21 NOTE — BH CONSULTATION LIAISON PROGRESS NOTE - NSICDXBHPRIMARYDX_PSY_ALL_CORE
Depression, major, recurrent, moderate   F33.1  

## 2023-12-21 NOTE — PROGRESS NOTE ADULT - PROBLEM SELECTOR PLAN 5
Chronic stable  Continue rosuvastatin 10mg daily formulary

## 2023-12-21 NOTE — BH CONSULTATION LIAISON PROGRESS NOTE - NSBHCHARTREVIEWVS_PSY_A_CORE FT
Vital Signs Last 24 Hrs  T(C): 36.3 (21 Dec 2023 05:15), Max: 36.9 (20 Dec 2023 15:32)  T(F): 97.4 (21 Dec 2023 05:15), Max: 98.5 (20 Dec 2023 15:32)  HR: 86 (21 Dec 2023 05:15) (65 - 102)  BP: 142/66 (21 Dec 2023 05:15) (102/59 - 142/66)  BP(mean): --  RR: 18 (21 Dec 2023 05:15) (17 - 18)  SpO2: 97% (21 Dec 2023 05:15) (97% - 99%)    Parameters below as of 21 Dec 2023 05:15  Patient On (Oxygen Delivery Method): room air    
Vital Signs Last 24 Hrs  T(C): 36.8 (19 Dec 2023 00:30), Max: 36.8 (19 Dec 2023 00:30)  T(F): 98.2 (19 Dec 2023 00:30), Max: 98.2 (19 Dec 2023 00:30)  HR: 88 (19 Dec 2023 00:30) (87 - 90)  BP: 139/88 (19 Dec 2023 00:30) (139/88 - 195/80)  BP(mean): --  RR: 16 (19 Dec 2023 00:30) (16 - 19)  SpO2: 97% (19 Dec 2023 00:30) (97% - 99%)    Parameters below as of 19 Dec 2023 00:30  Patient On (Oxygen Delivery Method): room air    
Vital Signs Last 24 Hrs  T(C): 36.8 (20 Dec 2023 11:53), Max: 36.8 (19 Dec 2023 14:33)  T(F): 98.2 (20 Dec 2023 11:53), Max: 98.3 (19 Dec 2023 14:33)  HR: 102 (20 Dec 2023 11:53) (79 - 109)  BP: 102/59 (20 Dec 2023 11:53) (91/55 - 172/75)  BP(mean): --  RR: 17 (20 Dec 2023 11:53) (17 - 17)  SpO2: 96% (20 Dec 2023 05:30) (93% - 98%)    Parameters below as of 20 Dec 2023 11:53  Patient On (Oxygen Delivery Method): room air

## 2024-03-08 PROBLEM — F41.9 ANXIETY DISORDER, UNSPECIFIED: Chronic | Status: ACTIVE | Noted: 2023-12-17

## 2024-03-08 PROBLEM — C34.90 MALIGNANT NEOPLASM OF UNSPECIFIED PART OF UNSPECIFIED BRONCHUS OR LUNG: Chronic | Status: ACTIVE | Noted: 2023-12-17

## 2024-03-08 PROBLEM — E78.5 HYPERLIPIDEMIA, UNSPECIFIED: Chronic | Status: ACTIVE | Noted: 2023-12-17

## 2024-03-08 PROBLEM — E03.9 HYPOTHYROIDISM, UNSPECIFIED: Chronic | Status: ACTIVE | Noted: 2023-12-17

## 2024-03-08 PROBLEM — N18.6 END STAGE RENAL DISEASE: Chronic | Status: ACTIVE | Noted: 2023-12-17

## 2024-03-08 PROBLEM — C73 MALIGNANT NEOPLASM OF THYROID GLAND: Chronic | Status: ACTIVE | Noted: 2023-12-17

## 2024-03-16 ENCOUNTER — APPOINTMENT (OUTPATIENT)
Dept: MRI IMAGING | Facility: CLINIC | Age: 79
End: 2024-03-16
Payer: MEDICARE

## 2024-03-16 PROCEDURE — 70551 MRI BRAIN STEM W/O DYE: CPT | Mod: MH

## 2024-03-16 PROCEDURE — 76377 3D RENDER W/INTRP POSTPROCES: CPT

## 2024-12-14 ENCOUNTER — INPATIENT (INPATIENT)
Facility: HOSPITAL | Age: 79
LOS: 3 days | Discharge: ANOTHER HOSPITAL | DRG: 321 | End: 2024-12-18
Attending: INTERNAL MEDICINE | Admitting: INTERNAL MEDICINE
Payer: MEDICARE

## 2024-12-14 VITALS
HEART RATE: 83 BPM | DIASTOLIC BLOOD PRESSURE: 97 MMHG | SYSTOLIC BLOOD PRESSURE: 197 MMHG | RESPIRATION RATE: 20 BRPM | OXYGEN SATURATION: 100 % | TEMPERATURE: 98 F | WEIGHT: 207.01 LBS

## 2024-12-14 LAB
ALBUMIN SERPL ELPH-MCNC: 3.4 G/DL — SIGNIFICANT CHANGE UP (ref 3.3–5)
ALP SERPL-CCNC: 138 U/L — HIGH (ref 40–120)
ALT FLD-CCNC: 18 U/L — SIGNIFICANT CHANGE UP (ref 12–78)
ANION GAP SERPL CALC-SCNC: 8 MMOL/L — SIGNIFICANT CHANGE UP (ref 5–17)
AST SERPL-CCNC: 5 U/L — LOW (ref 15–37)
BASOPHILS # BLD AUTO: 0.05 K/UL — SIGNIFICANT CHANGE UP (ref 0–0.2)
BASOPHILS NFR BLD AUTO: 0.5 % — SIGNIFICANT CHANGE UP (ref 0–2)
BILIRUB SERPL-MCNC: 0.2 MG/DL — SIGNIFICANT CHANGE UP (ref 0.2–1.2)
BUN SERPL-MCNC: 63 MG/DL — HIGH (ref 7–23)
CALCIUM SERPL-MCNC: 8.3 MG/DL — LOW (ref 8.5–10.1)
CHLORIDE SERPL-SCNC: 99 MMOL/L — SIGNIFICANT CHANGE UP (ref 96–108)
CO2 SERPL-SCNC: 28 MMOL/L — SIGNIFICANT CHANGE UP (ref 22–31)
CREAT SERPL-MCNC: 5.8 MG/DL — HIGH (ref 0.5–1.3)
EGFR: 9 ML/MIN/1.73M2 — LOW
EOSINOPHIL # BLD AUTO: 0.16 K/UL — SIGNIFICANT CHANGE UP (ref 0–0.5)
EOSINOPHIL NFR BLD AUTO: 1.5 % — SIGNIFICANT CHANGE UP (ref 0–6)
GLUCOSE SERPL-MCNC: 137 MG/DL — HIGH (ref 70–99)
HCT VFR BLD CALC: 31 % — LOW (ref 39–50)
HGB BLD-MCNC: 10.1 G/DL — LOW (ref 13–17)
IMM GRANULOCYTES NFR BLD AUTO: 0.4 % — SIGNIFICANT CHANGE UP (ref 0–0.9)
LIDOCAIN IGE QN: 49 U/L — SIGNIFICANT CHANGE UP (ref 13–75)
LYMPHOCYTES # BLD AUTO: 1.29 K/UL — SIGNIFICANT CHANGE UP (ref 1–3.3)
LYMPHOCYTES # BLD AUTO: 12.1 % — LOW (ref 13–44)
MAGNESIUM SERPL-MCNC: 2.2 MG/DL — SIGNIFICANT CHANGE UP (ref 1.6–2.6)
MCHC RBC-ENTMCNC: 32.6 G/DL — SIGNIFICANT CHANGE UP (ref 32–36)
MCHC RBC-ENTMCNC: 33.2 PG — SIGNIFICANT CHANGE UP (ref 27–34)
MCV RBC AUTO: 102 FL — HIGH (ref 80–100)
MONOCYTES # BLD AUTO: 0.79 K/UL — SIGNIFICANT CHANGE UP (ref 0–0.9)
MONOCYTES NFR BLD AUTO: 7.4 % — SIGNIFICANT CHANGE UP (ref 2–14)
NEUTROPHILS # BLD AUTO: 8.33 K/UL — HIGH (ref 1.8–7.4)
NEUTROPHILS NFR BLD AUTO: 78.1 % — HIGH (ref 43–77)
NRBC # BLD: 0 /100 WBCS — SIGNIFICANT CHANGE UP (ref 0–0)
PHOSPHATE SERPL-MCNC: 6 MG/DL — HIGH (ref 2.5–4.5)
PLATELET # BLD AUTO: 306 K/UL — SIGNIFICANT CHANGE UP (ref 150–400)
POTASSIUM SERPL-MCNC: 4.6 MMOL/L — SIGNIFICANT CHANGE UP (ref 3.5–5.3)
POTASSIUM SERPL-SCNC: 4.6 MMOL/L — SIGNIFICANT CHANGE UP (ref 3.5–5.3)
PROT SERPL-MCNC: 7.5 G/DL — SIGNIFICANT CHANGE UP (ref 6–8.3)
RBC # BLD: 3.04 M/UL — LOW (ref 4.2–5.8)
RBC # FLD: 14.4 % — SIGNIFICANT CHANGE UP (ref 10.3–14.5)
SODIUM SERPL-SCNC: 135 MMOL/L — SIGNIFICANT CHANGE UP (ref 135–145)
TROPONIN I, HIGH SENSITIVITY RESULT: 127 NG/L — HIGH
WBC # BLD: 10.66 K/UL — HIGH (ref 3.8–10.5)
WBC # FLD AUTO: 10.66 K/UL — HIGH (ref 3.8–10.5)

## 2024-12-14 PROCEDURE — 93010 ELECTROCARDIOGRAM REPORT: CPT | Mod: 76

## 2024-12-14 PROCEDURE — 71045 X-RAY EXAM CHEST 1 VIEW: CPT | Mod: 26

## 2024-12-14 PROCEDURE — 99285 EMERGENCY DEPT VISIT HI MDM: CPT

## 2024-12-14 RX ORDER — HEPARIN SODIUM,PORCINE 1000/ML
4000 VIAL (ML) INJECTION EVERY 6 HOURS
Refills: 0 | Status: DISCONTINUED | OUTPATIENT
Start: 2024-12-14 | End: 2024-12-16

## 2024-12-14 RX ORDER — ONDANSETRON HYDROCHLORIDE 4 MG/1
4 TABLET, FILM COATED ORAL ONCE
Refills: 0 | Status: COMPLETED | OUTPATIENT
Start: 2024-12-14 | End: 2024-12-14

## 2024-12-14 RX ORDER — TICAGRELOR 90 MG/1
180 TABLET ORAL ONCE
Refills: 0 | Status: COMPLETED | OUTPATIENT
Start: 2024-12-14 | End: 2024-12-14

## 2024-12-14 RX ORDER — NITROGLYCERIN 0.4MG/HR
0.4 PATCH, TRANSDERMAL 24 HOURS TRANSDERMAL
Refills: 0 | Status: DISCONTINUED | OUTPATIENT
Start: 2024-12-14 | End: 2024-12-18

## 2024-12-14 RX ORDER — HEPARIN SODIUM,PORCINE 1000/ML
VIAL (ML) INJECTION
Qty: 25000 | Refills: 0 | Status: DISCONTINUED | OUTPATIENT
Start: 2024-12-14 | End: 2024-12-16

## 2024-12-14 RX ORDER — HEPARIN SODIUM,PORCINE 1000/ML
4000 VIAL (ML) INJECTION ONCE
Refills: 0 | Status: COMPLETED | OUTPATIENT
Start: 2024-12-14 | End: 2024-12-15

## 2024-12-14 RX ORDER — FAMOTIDINE 20 MG/1
20 TABLET, FILM COATED ORAL ONCE
Refills: 0 | Status: COMPLETED | OUTPATIENT
Start: 2024-12-14 | End: 2024-12-14

## 2024-12-14 RX ADMIN — Medication 324 MILLIGRAM(S): at 23:31

## 2024-12-14 RX ADMIN — FAMOTIDINE 20 MILLIGRAM(S): 20 TABLET, FILM COATED ORAL at 23:30

## 2024-12-14 RX ADMIN — Medication 0.4 MILLIGRAM(S): at 23:40

## 2024-12-14 RX ADMIN — TICAGRELOR 180 MILLIGRAM(S): 90 TABLET ORAL at 23:39

## 2024-12-14 RX ADMIN — ONDANSETRON HYDROCHLORIDE 4 MILLIGRAM(S): 4 TABLET, FILM COATED ORAL at 23:29

## 2024-12-14 NOTE — ED ADULT TRIAGE NOTE - CHIEF COMPLAINT QUOTE
Patient BIBA from [facility], complaining of nausea and vomiting and HTN earlier today, has left arm dialysis (MWF), states has not missed any appointments, 18g right hand, given 4mg of zofran prior

## 2024-12-14 NOTE — ED PROVIDER NOTE - CLINICAL SUMMARY MEDICAL DECISION MAKING FREE TEXT BOX
patient is a 79-year-old male with past med history of HTN, HLD, dialysis with left arm fistula, Monday Wednesday Friday dialysis who presents emergency department for chest pain.  This started just as patient was going to sleep associate with nausea vomiting pain rating down his left upper extremity as well as to his jaw and neck.  Patient has never had pain like this before.  Patient is supposed to be taking aspirin, however stopped because he did not want to take it anymore.  Denies history of stents.      Discussed with interventional cardiology recommending heparin, aspirin, Brilinta and repeat EKG     will obtain labs, chest x-ray, medicate and reevaluate. patient is a 79-year-old male with past med history of HTN, HLD, dialysis with left arm fistula, Monday Wednesday Friday dialysis who presents emergency department for chest pain.  This started just as patient was going to sleep associate with nausea vomiting pain rating down his left upper extremity as well as to his jaw and neck.  Patient has never had pain like this before.  Patient is supposed to be taking aspirin, however stopped because he did not want to take it anymore.  Denies history of stents.    Discussed with interventional cardiology recommending heparin, aspirin, Brilinta and repeat EKG     will obtain labs, chest x-ray, medicate and reevaluate.

## 2024-12-14 NOTE — ED PROVIDER NOTE - ST/T WAVE
STD in I, II, aVL, V4-V6, TAYLA III, aVR, V2 - this is new from 12/17/23 STD in I, II, aVL, V4-V6, TAYLA aVR, V2 - same as previous, III has mildly improved

## 2024-12-14 NOTE — ED PROVIDER NOTE - PROGRESS NOTE DETAILS
discussed with interventional cards, Dr. Bernal, recommends heparin, asa, brilinta and repeat EKG. no cath lab at this time discussed with Dr. Gonzalez, covering for Dr. Higgins, will admit patient. also a cardiologist, agrees, recommends morphine if pain does not improve.

## 2024-12-14 NOTE — ED PROVIDER NOTE - NS ED MD TWO NIGHTS YN
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  Previous VEEG showed focal seizures originating from the left hemisphere, L frontotemporal/parietal spikes and sharps, and rare R temporal spikes. Recently admitted 11/6/17-11/15/17 and discharged on keppra, CBZ, and phenobarbital.  In last 2-3 days has had increased frequency and duration of seizures (up to 10/day, lasting up to 1 minute) and has been moving his right arm less in comparison to the left side..  CBZ level 5.1 yesterday Yes

## 2024-12-14 NOTE — ED PROVIDER NOTE - PHYSICAL EXAMINATION
General: uncomfortable appearing, no acute distress, appropriate weight, vomiting.  Head: normocephalic, atraumatic.   Cardiac: heart RRR, no murmurs, rubs, gallops, pulses 2+ x4. chest has no TTP  Pulm: lungs CTA  GI: abdomen soft, nontender, negative rebound, not distended  Skin: warm, dry, no rash, laceration, abrasion, contusion  Fistula to L arm with palaple thrill

## 2024-12-15 DIAGNOSIS — R07.9 CHEST PAIN, UNSPECIFIED: ICD-10-CM

## 2024-12-15 DIAGNOSIS — N18.6 END STAGE RENAL DISEASE: ICD-10-CM

## 2024-12-15 DIAGNOSIS — I21.4 NON-ST ELEVATION (NSTEMI) MYOCARDIAL INFARCTION: ICD-10-CM

## 2024-12-15 LAB
APTT BLD: 32.1 SEC — SIGNIFICANT CHANGE UP (ref 24.5–35.6)
APTT BLD: 51.9 SEC — HIGH (ref 24.5–35.6)
APTT BLD: 56.7 SEC — HIGH (ref 24.5–35.6)
APTT BLD: 60.2 SEC — HIGH (ref 24.5–35.6)
HCT VFR BLD CALC: 31.4 % — LOW (ref 39–50)
HGB BLD-MCNC: 10.7 G/DL — LOW (ref 13–17)
INR BLD: 1.06 RATIO — SIGNIFICANT CHANGE UP (ref 0.85–1.16)
LACTATE SERPL-SCNC: 1.5 MMOL/L — SIGNIFICANT CHANGE UP (ref 0.7–2)
MCHC RBC-ENTMCNC: 34.1 G/DL — SIGNIFICANT CHANGE UP (ref 32–36)
MCHC RBC-ENTMCNC: 34.7 PG — HIGH (ref 27–34)
MCV RBC AUTO: 101.9 FL — HIGH (ref 80–100)
NRBC # BLD: 0 /100 WBCS — SIGNIFICANT CHANGE UP (ref 0–0)
PLATELET # BLD AUTO: 340 K/UL — SIGNIFICANT CHANGE UP (ref 150–400)
PROTHROM AB SERPL-ACNC: 12.5 SEC — SIGNIFICANT CHANGE UP (ref 9.9–13.4)
RBC # BLD: 3.08 M/UL — LOW (ref 4.2–5.8)
RBC # FLD: 14.2 % — SIGNIFICANT CHANGE UP (ref 10.3–14.5)
TROPONIN I, HIGH SENSITIVITY RESULT: HIGH NG/L
TROPONIN I, HIGH SENSITIVITY RESULT: HIGH NG/L
WBC # BLD: 12.37 K/UL — HIGH (ref 3.8–10.5)
WBC # FLD AUTO: 12.37 K/UL — HIGH (ref 3.8–10.5)

## 2024-12-15 PROCEDURE — 93010 ELECTROCARDIOGRAM REPORT: CPT

## 2024-12-15 RX ORDER — CALCIUM ACETATE 667 MG/5ML
1334 SOLUTION ORAL
Refills: 0 | Status: DISCONTINUED | OUTPATIENT
Start: 2024-12-15 | End: 2024-12-16

## 2024-12-15 RX ORDER — ALPRAZOLAM 0.5 MG
0.25 TABLET ORAL EVERY 8 HOURS
Refills: 0 | Status: DISCONTINUED | OUTPATIENT
Start: 2024-12-15 | End: 2024-12-18

## 2024-12-15 RX ORDER — LEVOTHYROXINE SODIUM 150 MCG
175 TABLET ORAL DAILY
Refills: 0 | Status: DISCONTINUED | OUTPATIENT
Start: 2024-12-15 | End: 2024-12-18

## 2024-12-15 RX ORDER — ISOSORBIDE MONONITRATE 10 MG
30 TABLET ORAL DAILY
Refills: 0 | Status: DISCONTINUED | OUTPATIENT
Start: 2024-12-15 | End: 2024-12-18

## 2024-12-15 RX ORDER — METOPROLOL TARTRATE 100 MG/1
25 TABLET, FILM COATED ORAL THREE TIMES A DAY
Refills: 0 | Status: DISCONTINUED | OUTPATIENT
Start: 2024-12-15 | End: 2024-12-18

## 2024-12-15 RX ORDER — PANTOPRAZOLE SODIUM 40 MG/1
40 TABLET, DELAYED RELEASE ORAL DAILY
Refills: 0 | Status: DISCONTINUED | OUTPATIENT
Start: 2024-12-15 | End: 2024-12-18

## 2024-12-15 RX ORDER — PAROXETINE HYDROCHLORIDE HEMIHYDRATE 37.5 MG/1
40 TABLET, FILM COATED, EXTENDED RELEASE ORAL DAILY
Refills: 0 | Status: DISCONTINUED | OUTPATIENT
Start: 2024-12-15 | End: 2024-12-18

## 2024-12-15 RX ORDER — CLOPIDOGREL 75 MG/1
75 TABLET, FILM COATED ORAL DAILY
Refills: 0 | Status: DISCONTINUED | OUTPATIENT
Start: 2024-12-15 | End: 2024-12-17

## 2024-12-15 RX ORDER — ACETAMINOPHEN 500MG 500 MG/1
650 TABLET, COATED ORAL EVERY 6 HOURS
Refills: 0 | Status: DISCONTINUED | OUTPATIENT
Start: 2024-12-15 | End: 2024-12-18

## 2024-12-15 RX ORDER — POLYETHYLENE GLYCOL 3350 17 G/17G
17 POWDER, FOR SOLUTION ORAL AT BEDTIME
Refills: 0 | Status: DISCONTINUED | OUTPATIENT
Start: 2024-12-15 | End: 2024-12-18

## 2024-12-15 RX ADMIN — Medication 80 MILLIGRAM(S): at 21:10

## 2024-12-15 RX ADMIN — Medication 80 MILLIGRAM(S): at 05:48

## 2024-12-15 RX ADMIN — CALCIUM ACETATE 1334 MILLIGRAM(S): 667 SOLUTION ORAL at 17:00

## 2024-12-15 RX ADMIN — Medication 0.25 MILLIGRAM(S): at 17:02

## 2024-12-15 RX ADMIN — METOPROLOL TARTRATE 25 MILLIGRAM(S): 100 TABLET, FILM COATED ORAL at 21:10

## 2024-12-15 RX ADMIN — ACETAMINOPHEN 500MG 650 MILLIGRAM(S): 500 TABLET, COATED ORAL at 22:11

## 2024-12-15 RX ADMIN — METOPROLOL TARTRATE 25 MILLIGRAM(S): 100 TABLET, FILM COATED ORAL at 11:11

## 2024-12-15 RX ADMIN — Medication 1200 UNIT(S)/HR: at 15:11

## 2024-12-15 RX ADMIN — METOPROLOL TARTRATE 25 MILLIGRAM(S): 100 TABLET, FILM COATED ORAL at 05:49

## 2024-12-15 RX ADMIN — POLYETHYLENE GLYCOL 3350 17 GRAM(S): 17 POWDER, FOR SOLUTION ORAL at 21:10

## 2024-12-15 RX ADMIN — CLOPIDOGREL 75 MILLIGRAM(S): 75 TABLET, FILM COATED ORAL at 05:51

## 2024-12-15 RX ADMIN — Medication 1000 UNIT(S)/HR: at 00:00

## 2024-12-15 RX ADMIN — Medication 4000 UNIT(S): at 00:01

## 2024-12-15 RX ADMIN — Medication 1000 UNIT(S)/HR: at 07:23

## 2024-12-15 RX ADMIN — Medication 5 MILLIGRAM(S): at 21:09

## 2024-12-15 RX ADMIN — Medication 30 MILLIGRAM(S): at 05:48

## 2024-12-15 RX ADMIN — CALCIUM ACETATE 1334 MILLIGRAM(S): 667 SOLUTION ORAL at 12:04

## 2024-12-15 RX ADMIN — PAROXETINE HYDROCHLORIDE HEMIHYDRATE 40 MILLIGRAM(S): 37.5 TABLET, FILM COATED, EXTENDED RELEASE ORAL at 11:11

## 2024-12-15 RX ADMIN — Medication 0.4 MILLIGRAM(S): at 02:57

## 2024-12-15 RX ADMIN — Medication 1200 UNIT(S)/HR: at 21:06

## 2024-12-15 RX ADMIN — Medication 1200 UNIT(S)/HR: at 22:24

## 2024-12-15 RX ADMIN — ACETAMINOPHEN 500MG 650 MILLIGRAM(S): 500 TABLET, COATED ORAL at 21:11

## 2024-12-15 RX ADMIN — Medication 1200 UNIT(S)/HR: at 21:04

## 2024-12-15 RX ADMIN — Medication 1 MILLIGRAM(S): at 05:46

## 2024-12-15 NOTE — H&P ADULT - NSHPPHYSICALEXAM_GEN_ALL_CORE
· Physical Examination: General: uncomfortable appearing, no acute distress, appropriate weight, vomiting.  	Head: normocephalic, atraumatic.   	Cardiac: heart RRR, no murmurs, rubs, gallops, pulses 2+ x4. chest has no TTP  	Pulm: lungs CTA  	GI: abdomen soft, nontender, negative rebound, not distended  	Skin: warm, dry, no rash, laceration, abrasion, contusion  Fistula to L arm with palaple thrill

## 2024-12-15 NOTE — H&P ADULT - ASSESSMENT
Initial evaluation/Admission HNP  requested by Dr Gonzalez  on 12/15/2024 from Dr Andrei Garcia    Patient examined chart reviewed    HOSPITAL ADMISSION   PATIENT CAME  FROM (if information available)      [id number]  BEST PRACTICE ISSUES.  . HOB ELEVATN.    .... Yes  . DIET.   .... 12/15/2024 dash  . IV FLUIDS.  ....   . DVT PPLX.     .... 12/14 11p iv ufh ( MI)   . STRESS ULCER PPLX.   .... 12/15/2024 protonix 40   . INFECTION PPLX.   ....     [id number]  GENERAL DATA .   GOC.    ..    ICU STAY.    .. no   COVID.   ..   ALLGY.   ..     nka   WT.   ..  12/15/2024 93  BMI.      [id number]  VITALS/GAS EXCHANGE/DRIPS  ABGS.   .  VS/ PO/IO/ VENT/ DRIPS.   12/15/2024 afeb 104 130/80   12/15/2024 RA 94%        CHIEF COMPLAINT.   . 12/15/2024 Patient BIBA from [facility], complaining of nausea and vomiting and HTN earlier today, has left arm dialysis (MWF), states has not missed any appointments, 18g right hand, given 4mg of zofran prior    OVERALL PRESENTATION.  . 12/15/2024 79-year-old male with past med history of HTN, HLD, dialysis with left arm fistula, Monday Wednesday Friday dialysis who presents emergency department for chest pain.  This started just as patient was going to sleep associate with nausea vomiting pain rating down his left upper extremity as well as to his jaw and neck.  Patient has never had pain like this before.  Patient is supposed to be taking aspirin, however stopped because he did not want to take it anymore.  Denies history of stents.  Discussed with interventional cardiology recommending heparin, aspirin, Brilinta and repeat EKG    PMH.     PAST HOSPITAL STAYS .   .  HOME MEDS.    [id number]  PROBLEM ASSESSMENT RECOMMENDATIONS.  RESP.   .... Target PO 90-95%    INFECTION.  .... w 12/15/2024 w 12.3   .... No obvious infn noted     CARDIAC.  . MI    .... Trop 12/14 - 12/15 Tr [id number]   .... 12/15/2024 plavx 75   .... 12/14 11p iv ufh   .... 12/15/2024 ATORVASTAT 80   .... 12/15/2024 IMDUR 30   .... 12/15/2024 METOPROLOL 25.3   .... 12/15/2024 MS 1 Q 2 h p  .... pbnp     HEMAT.  .... Hb 12/15/2024 Hb 10.7   .... Plt 12/15/2024 Plt 240   .... 12/15/2024 On iv ufh   .... monitor      GI.  RENAL.  . ESRD  .... Na 12/15/2024 na 135  .... K 12/15/2024 K 4.6   .... CO2 12/15/2024 co2 28   .... AG 12/15/2024 ag 8   .... Alb 12/15/2024 alb 3.4   .... Cr 12/15/2024 Cr 5.8   .... Ca acetate 1334 x 3   .... HD as per renal     . BPH  .... 12/15/2024 FINASTAERIDE 5    ENDO.  .... 12/15/2024 LEVOXYL 175    NEURO.  .... 12/15/2024 Paxil 40   .... 12/15/2024 alprazolam .25 x 3 p     DISCUSSIONS.    [id number]   SUMMARY  CC.   . 12/15/2024 chest discomfort  . 12/15/2024 Nausea vomiting     PROBLEMS .  . NON TAYLA MI   . ESRD     PMH.  . HTN,   . HLD,   . dialysis with left arm        TIME SPENT.  . Over 55  minutes aggregate care time spent on encounter; activities included   direct patient care, counseling and/or coordinating care reviewing notes, lab data/ imaging , discussion with multidisciplinary team/ patient  /family and explaining in detail risks, benefits, alternatives  of the recommendations     KALEE GOSS 79 12/15/2024 1945

## 2024-12-15 NOTE — CONSULT NOTE ADULT - TIME BILLING
in direct care of patient , reviewing labs and other results and adjusting medications and in discussion with RN , PA , PMD and other consultants .

## 2024-12-15 NOTE — CONSULT NOTE ADULT - SUBJECTIVE AND OBJECTIVE BOX
[facility]     Patient is a 79y old  Male who presents with a chief complaint of     HPI:      HPI:    79y Male for Cardiology Consult    PAST MEDICAL & SURGICAL HISTORY:      FAMILY HISTORY:      SOCIAL HISTORY:   Alcohol:  Smoking:    Allergies    No Known Allergies    Intolerances        MEDICATIONS  (STANDING):  heparin  Infusion.  Unit(s)/Hr (10 mL/Hr) IV Continuous <Continuous>    MEDICATIONS  (PRN):  heparin   Injectable 4000 Unit(s) IV Push every 6 hours PRN For aPTT less than 40  nitroglycerin     SubLingual 0.4 milliGRAM(s) SubLingual every 5 minutes PRN Chest Pain      REVIEW OF SYSTEMS:  CONSTITUTIONAL: No fever, weight loss, chills, shakes, or fat  RESPIRATORY: No cough, wheezing, hemoptysis, or shortness of breath  CARDIOVASCULAR: No chest pain, dyspnea, palpitations, dizziness, syncope, paroxysmal nocturnal dyspnea, orthopnea, or arm or leg swelling  GASTROINTESTINAL: No abdominal  or epigastric pain, nausea, vomiting, hematemesis, diarrhea, constipation, melena or bright red bloo  NEUROLOGICAL: No headaches, memory loss, slurred speech, limb weakness, loss of strength, numbness, or tremors  SKIN: No itching, burning, rashes, or lesions  ENDOCRINE: No heat or cold intolerance, or hair loss  MUSCULOSKELETAL: No joint pain or swelling, muscle, back, or extremity pain  HEME/LYMPH: No easy bruising or bleeding gums  ALLERY AND IMMUNOLOGIC: No hives or rash.    Vital Signs Last 24 Hrs  T(C): 36.7 (15 Dec 2024 05:02), Max: 36.7 (15 Dec 2024 00:54)  T(F): 98 (15 Dec 2024 05:02), Max: 98.1 (15 Dec 2024 00:54)  HR: 104 (15 Dec 2024 05:02) (83 - 107)  BP: 137/83 (15 Dec 2024 05:02) (137/83 - 197/97)  BP(mean): --  RR: 22 (15 Dec 2024 05:02) (20 - 22)  SpO2: 95% (15 Dec 2024 05:02) (95% - 100%)    Parameters below as of 15 Dec 2024 04:36  Patient On (Oxygen Delivery Method): room air        PHYSICAL EXAM:  HEAD:  Atraumatic, Normocephalic  EYES: EOMI, PERRLA, conjunctiva and sclera clear  NECK: Supple and normal thyroid.  No JVD or carotid bruit.   HEART: S1, S2 regular , 1/6 soft ejection systolic murmur in mitral area , no thrill and no gallops .  PULMONARY: Bilateral vesicular breathing , few scattered ronchi and few scattered rales are present .  ABDOMEN: Soft nontender and positive bowl sounds   EXTREMITIES:  No clubbing, cyanosis, or pedal  edema  NEUROLOGICAL: AAOX3 , no focal deficit .    LABS:                        10.1   10.66 )-----------( 306      ( 14 Dec 2024 23:21 )             31.0     12-14    135  |  99  |  63[H]  ----------------------------<  137[H]  4.6   |  28  |  5.80[H]    Ca    8.3[L]      14 Dec 2024 23:21  Phos  6.0     12-14  Mg     2.2     12-14    TPro  7.5  /  Alb  3.4  /  TBili  0.2  /  DBili  x   /  AST  5[L]  /  ALT  18  /  AlkPhos  138[H]  12-14        PT/INR - ( 15 Dec 2024 00:25 )   PT: 12.5 sec;   INR: 1.06 ratio         PTT - ( 15 Dec 2024 00:25 )  PTT:32.1 sec  Urinalysis Basic - ( 14 Dec 2024 23:21 )    Color: x / Appearance: x / SG: x / pH: x  Gluc: 137 mg/dL / Ketone: x  / Bili: x / Urobili: x   Blood: x / Protein: x / Nitrite: x   Leuk Esterase: x / RBC: x / WBC x   Sq Epi: x / Non Sq Epi: x / Bacteria: x      BNP      EKG:  ECHO:  IMAGING:    Assessment and Plan :     Will continue to follow during hospital course and give further recommendations as needed . Thanks for your referral . if any questions please contact me at office ([phone] [phone])  MARIA ALEJANDRA DUNN MD [place] [address]  [address]  OFFICE : [phone]  CELL : [phone]  CARDIOLOGY CONSULT :    Patient is a 79y old  Male who presents with a chief complaint of chest pain .  Chief Complaint: chest discomfort and nausea vomiting. Patient chest pain is on the left side of chest and better with nitrates and also has EKG abnormalities . Patient Troponin I elevated . Patient feeling much better at the time of my examination and chest pain significantly better.  Patient mild SOB but better . Patient ambulates with walker and can do more than 100 feet slowly . Patient understands all questions very well and answers appropriately . Patient history obtained from patient himself , RN , PA , PMD and other consultants notes .   PAST MEDICAL & SURGICAL HISTORY:  H/O Hypertension .  ESRD and on hemodialysis .  Depression .  Hyperlipidemia .    FAMILY HISTORY: H/O CAD in the family       SOCIAL HISTORY:   Alcohol: Patient drinks alcohol socially occasionally .  Smoking: Patient quit smoking many years ago .    Allergies    No Known Allergies    Intolerances        MEDICATIONS  (STANDING):  heparin  Infusion.  Unit(s)/Hr (10 mL/Hr) IV Continuous <Continuous>    MEDICATIONS  (PRN):  heparin   Injectable 4000 Unit(s) IV Push every 6 hours PRN For aPTT less than 40  nitroglycerin     SubLingual 0.4 milliGRAM(s) SubLingual every 5 minutes PRN Chest Pain      REVIEW OF SYSTEMS:  CONSTITUTIONAL: No fever .  RESPIRATORY: No cough, wheezing, hemoptysis, and has mild  shortness of breath which is better .  CARDIOVASCULAR: Patient has left sided  chest pain which is now better and has mild SOB which is also better , but has no  palpitations, dizziness, syncope, paroxysmal nocturnal dyspnea, or arm or leg swelling  GASTROINTESTINAL: No abdominal  or epigastric pain,  hematemesis, diarrhea, constipation, melena or bright red blood . Patient has brief episode of nausea and vomiting which is better ,  NEUROLOGICAL: No headaches, memory loss, slurred speech, limb weakness, loss of strength, numbness, or tremors  SKIN: No itching, burning, rashes, or lesions  ENDOCRINE: No heat or cold intolerance, or hair loss  MUSCULOSKELETAL: Patient has mild chronic arthritis .  HEME/LYMPH: No easy bruising or bleeding gums  ALLERGY AND IMMUNOLOGIC: No hives or rash.    Vital Signs Last 24 Hrs  T(C): 36.7 (15 Dec 2024 05:02), Max: 36.7 (15 Dec 2024 00:54)  T(F): 98 (15 Dec 2024 05:02), Max: 98.1 (15 Dec 2024 00:54)  HR: 104 (15 Dec 2024 05:02) (83 - 107)  BP: 137/83 (15 Dec 2024 05:02) (137/83 - 197/97)  BP(mean): --  RR: 22 (15 Dec 2024 05:02) (20 - 22)  SpO2: 95% (15 Dec 2024 05:02) (95% - 100%)    Parameters below as of 15 Dec 2024 04:36  Patient On (Oxygen Delivery Method): room air        PHYSICAL EXAM:  HEAD:  Atraumatic, Normocephalic  EYES: EOMI, PERRLA, conjunctiva and sclera clear  NECK: Supple and normal thyroid.  No JVD or carotid bruit.   HEART: S1, S2 regular , 1/6 soft ejection systolic murmur in mitral area , no thrill and no gallops .  PULMONARY: Bilateral vesicular breathing , few scattered rhonchi and few scattered rales are present .  ABDOMEN: Soft nontender and positive bowl sounds   EXTREMITIES:  No clubbing, cyanosis, or pedal  edema  NEUROLOGICAL: AAOX3 , no focal deficit .    LABS:                        10.1   10.66 )-----------( 306      ( 14 Dec 2024 23:21 )             31.0     12-14    135  |  99  |  63[H]  ----------------------------<  137[H]  4.6   |  28  |  5.80[H]    Ca    8.3[L]      14 Dec 2024 23:21  Phos  6.0     12-14  Mg     2.2     12-14    TPro  7.5  /  Alb  3.4  /  TBili  0.2  /  DBili  x   /  AST  5[L]  /  ALT  18  /  AlkPhos  138[H]  12-14        PT/INR - ( 15 Dec 2024 00:25 )   PT: 12.5 sec;   INR: 1.06 ratio         PTT - ( 15 Dec 2024 00:25 )  PTT:32.1 sec  Urinalysis Basic - ( 14 Dec 2024 23:21 )    Color: x / Appearance: x / SG: x / pH: x  Gluc: 137 mg/dL / Ketone: x  / Bili: x / Urobili: x   Blood: x / Protein: x / Nitrite: x   Leuk Esterase: x / RBC: x / WBC x   Sq Epi: x / Non Sq Epi: x / Bacteria: x            EKG: Sinus tachycardia , lateral wall ischemic ST-T changes .  Assessment and Plan :   chest discomfort and nausea vomiting. Patient chest pain is on the left side of chest and better with nitrates and also has EKG abnormalities . Patient Troponin I elevated . Patient feeling much better at the time of my examination and chest pain significantly better.  Patient mild SOB but better . Patient ambulates with walker and can do more than 100 feet slowly . Patient understands all questions very well and answers appropriately . Patient history obtained from patient himself , RN , PA , PMD and other consultants notes .   Patient Second Troponin I significantly elevated and patient has ACS , NON-ST FRANCK . Continue aspirin , Plavix , nitrates , metoprolol and I/V heparin drip . Will repeat Troponin I levels to see further trending of Troponin I levels . Monitor hemoglobin and electrolytes . Will get echocardiogram done . Patient is chest pain free at this time and doing well . Will arrange cardiac cath tomorrow Monday if patient and family agrees . Patient cardiac wise stable and cleared for cardiac cath . Patient hemodynamically stable at this time . Prognosis guarded . Continue rest of medications as such .  Will continue to follow during hospital course and give further recommendations as needed . Thanks for your referral . if any questions please contact me at office ([phone] cell [phone])

## 2024-12-15 NOTE — ED ADULT NURSE NOTE - CHIEF COMPLAINT QUOTE
Patient BIBA from [facility], complaining of nausea and vomiting and HTN earlier today, has left arm dialysis (MWF), states has not missed any appointments, 18g right hand, given 4mg of zofran prior

## 2024-12-15 NOTE — CONSULT NOTE ADULT - SUBJECTIVE AND OBJECTIVE BOX
NEPHROLOGY CONSULTATION    CHIEF COMPLAINT: N/V, CP    HPI:  Pt is 78 yo m with past med history of HTN, HLD, ESRD on dialysis with left arm fistula Monday Wednesday Friday who presents to emergency department for chest pain.     ROS:  as above    Allergies:  No Known Allergies    PAST MEDICAL & SURGICAL HISTORY: as above  ESRD on dialysis  HTN  CAD  HLD  LUE AVF    SOCIAL HISTORY:  negative    FAMILY HISTORY:  NC    MEDICATIONS  (STANDING):  atorvastatin 80 milliGRAM(s) Oral at bedtime  calcium acetate 1334 milliGRAM(s) Oral three times a day with meals  clopidogrel Tablet 75 milliGRAM(s) Oral daily  finasteride 5 milliGRAM(s) Oral daily  heparin  Infusion.  Unit(s)/Hr (10 mL/Hr) IV Continuous <Continuous>  isosorbide   mononitrate ER Tablet (IMDUR) 30 milliGRAM(s) Oral daily  levothyroxine 175 MICROGram(s) Oral daily  metoprolol tartrate 25 milliGRAM(s) Oral three times a day  pantoprazole    Tablet 40 milliGRAM(s) Oral daily  PARoxetine 40 milliGRAM(s) Oral daily  polyethylene glycol 3350 17 Gram(s) Oral at bedtime    Vital Signs Last 24 Hrs  T(C): 36.7 (12-15-24 @ 08:12), Max: 36.7 (12-15-24 @ 00:54)  T(F): 98.1 (12-15-24 @ 08:12), Max: 98.1 (12-15-24 @ 00:54)  HR: 82 (12-15-24 @ 08:12) (82 - 107)  BP: 140/73 (12-15-24 @ 08:12) (137/83 - 197/97)  RR: 20 (12-15-24 @ 08:12) (20 - 22)  SpO2: 94% (12-15-24 @ 08:12) (94% - 100%)    LABS:                        10.7   12.37 )-----------( 340      ( 15 Dec 2024 06:23 )             31.4     12-14    135  |  99  |  63[H]  ----------------------------<  137[H]  4.6   |  28  |  5.80[H]    Ca    8.3[L]      14 Dec 2024 23:21  Phos  6.0     12-14  Mg     2.2     12-14    TPro  7.5  /  Alb  3.4  /  TBili  0.2  /  DBili  x   /  AST  5[L]  /  ALT  18  /  AlkPhos  138[H]  12-14    LIVER FUNCTIONS - ( 14 Dec 2024 23:21 )  Alb: 3.4 g/dL / Pro: 7.5 g/dL / ALK PHOS: 138 U/L / ALT: 18 U/L / AST: 5 U/L / GGT: x           PT/INR - ( 15 Dec 2024 00:25 )   PT: 12.5 sec;   INR: 1.06 ratio       PTT - ( 15 Dec 2024 13:53 )  PTT:51.9 sec    A/P:    full consult to follow    [phone]       NEPHROLOGY CONSULTATION    CHIEF COMPLAINT: N/V, CP    HPI:  Pt is 78 yo m with past med history of HTN, HLD, ESRD on dialysis MWF via LUE AVF who presented to emergency department for chest pain. Feels better today, still come chest discomfort on and off. Comfortable on NC O2. S/p N/V prior, not today. No F/C/D/C, min UO at baseline. Last HD on Friday.    ROS:  as above    Allergies:  No Known Allergies    PAST MEDICAL & SURGICAL HISTORY: as above  ESRD on dialysis  HTN  CAD  HLD  LUE AVF    SOCIAL HISTORY:  negative    FAMILY HISTORY:  NC    MEDICATIONS  (STANDING):  atorvastatin 80 milliGRAM(s) Oral at bedtime  calcium acetate 1334 milliGRAM(s) Oral three times a day with meals  clopidogrel Tablet 75 milliGRAM(s) Oral daily  finasteride 5 milliGRAM(s) Oral daily  heparin  Infusion.  Unit(s)/Hr (10 mL/Hr) IV Continuous <Continuous>  isosorbide   mononitrate ER Tablet (IMDUR) 30 milliGRAM(s) Oral daily  levothyroxine 175 MICROGram(s) Oral daily  metoprolol tartrate 25 milliGRAM(s) Oral three times a day  pantoprazole    Tablet 40 milliGRAM(s) Oral daily  PARoxetine 40 milliGRAM(s) Oral daily  polyethylene glycol 3350 17 Gram(s) Oral at bedtime    Vital Signs Last 24 Hrs  T(C): 36.7 (12-15-24 @ 08:12), Max: 36.7 (12-15-24 @ 00:54)  T(F): 98.1 (12-15-24 @ 08:12), Max: 98.1 (12-15-24 @ 00:54)  HR: 82 (12-15-24 @ 08:12) (82 - 107)  BP: 140/73 (12-15-24 @ 08:12) (137/83 - 197/97)  RR: 20 (12-15-24 @ 08:12) (20 - 22)  SpO2: 94% (12-15-24 @ 08:12) (94% - 100%)    s1s2  b/l air entry  soft, ND  sm edema, LUE AVF + bruit     LABS:                        10.7   12.37 )-----------( 340      ( 15 Dec 2024 06:23 )             31.4     12-14    135  |  99  |  63[H]  ----------------------------<  137[H]  4.6   |  28  |  5.80[H]    Ca    8.3[L]      14 Dec 2024 23:21  Phos  6.0     12-14  Mg     2.2     12-14    TPro  7.5  /  Alb  3.4  /  TBili  0.2  /  DBili  x   /  AST  5[L]  /  ALT  18  /  AlkPhos  138[H]  12-14    LIVER FUNCTIONS - ( 14 Dec 2024 23:21 )  Alb: 3.4 g/dL / Pro: 7.5 g/dL / ALK PHOS: 138 U/L / ALT: 18 U/L / AST: 5 U/L / GGT: x           PT/INR - ( 15 Dec 2024 00:25 )   PT: 12.5 sec;   INR: 1.06 ratio       PTT - ( 15 Dec 2024 13:53 )  PTT:51.9 sec    A/P:    Hx of ESRD on HD MWF at [facility] w/Dr Silvia Thornton w/CP, on Heparin qtt  Cardiology input appr  Plan for cath tomorrow   Plan for HD tomorrow after cath  Renal diet  Phoslo for high Phos   Will follow     [phone]

## 2024-12-15 NOTE — H&P ADULT - HISTORY OF PRESENT ILLNESS
CHIEF COMPLAINT.   . 12/15/2024 Patient BIBA from [facility], complaining of nausea and vomiting and HTN earlier today, has left arm dialysis (MWF), states has not missed any appointments, 18g right hand, given 4mg of zofran prior    OVERALL PRESENTATION.  . 12/15/2024 79-year-old male with past med history of HTN, HLD, dialysis with left arm fistula, Monday Wednesday Friday dialysis who presents emergency department for chest pain.  This started just as patient was going to sleep associate with nausea vomiting pain rating down his left upper extremity as well as to his jaw and neck.  Patient has never had pain like this before.  Patient is supposed to be taking aspirin, however stopped because he did not want to take it anymore.  Denies history of stents.  Discussed with interventional cardiology recommending heparin, aspirin, Brilinta and repeat EKG

## 2024-12-15 NOTE — ED ADULT NURSE NOTE - OBJECTIVE STATEMENT
Received Pt awake and alert, sent from [facility] for nausea, vomiting and some chest pressure,b started today. Received Pt awake and alert, sent from [facility] for nausea, vomiting and some chest pressure, started today.

## 2024-12-16 DIAGNOSIS — I10 ESSENTIAL (PRIMARY) HYPERTENSION: ICD-10-CM

## 2024-12-16 DIAGNOSIS — E78.5 HYPERLIPIDEMIA, UNSPECIFIED: ICD-10-CM

## 2024-12-16 DIAGNOSIS — E03.9 HYPOTHYROIDISM, UNSPECIFIED: ICD-10-CM

## 2024-12-16 DIAGNOSIS — E87.5 HYPERKALEMIA: ICD-10-CM

## 2024-12-16 DIAGNOSIS — N40.0 BENIGN PROSTATIC HYPERPLASIA WITHOUT LOWER URINARY TRACT SYMPTOMS: ICD-10-CM

## 2024-12-16 DIAGNOSIS — F41.9 ANXIETY DISORDER, UNSPECIFIED: ICD-10-CM

## 2024-12-16 DIAGNOSIS — I50.32 CHRONIC DIASTOLIC (CONGESTIVE) HEART FAILURE: ICD-10-CM

## 2024-12-16 DIAGNOSIS — Z29.9 ENCOUNTER FOR PROPHYLACTIC MEASURES, UNSPECIFIED: ICD-10-CM

## 2024-12-16 LAB
A1C WITH ESTIMATED AVERAGE GLUCOSE RESULT: 5.4 % — SIGNIFICANT CHANGE UP (ref 4–5.6)
ALBUMIN SERPL ELPH-MCNC: 3.6 G/DL — SIGNIFICANT CHANGE UP (ref 3.3–5)
ALP SERPL-CCNC: 92 U/L — SIGNIFICANT CHANGE UP (ref 40–120)
ALT FLD-CCNC: 31 U/L — SIGNIFICANT CHANGE UP (ref 12–78)
ANION GAP SERPL CALC-SCNC: 10 MMOL/L — SIGNIFICANT CHANGE UP (ref 5–17)
ANION GAP SERPL CALC-SCNC: 11 MMOL/L — SIGNIFICANT CHANGE UP (ref 5–17)
ANION GAP SERPL CALC-SCNC: 12 MMOL/L — SIGNIFICANT CHANGE UP (ref 5–17)
APTT BLD: 106 SEC — HIGH (ref 24.5–35.6)
APTT BLD: 42.1 SEC — HIGH (ref 24.5–35.6)
APTT BLD: 62.5 SEC — HIGH (ref 24.5–35.6)
APTT BLD: 83.2 SEC — HIGH (ref 24.5–35.6)
AST SERPL-CCNC: 77 U/L — HIGH (ref 15–37)
BILIRUB SERPL-MCNC: 0.3 MG/DL — SIGNIFICANT CHANGE UP (ref 0.2–1.2)
BUN SERPL-MCNC: 36 MG/DL — HIGH (ref 7–23)
BUN SERPL-MCNC: 83 MG/DL — HIGH (ref 7–23)
BUN SERPL-MCNC: 83 MG/DL — HIGH (ref 7–23)
CALCIUM SERPL-MCNC: 9.1 MG/DL — SIGNIFICANT CHANGE UP (ref 8.5–10.1)
CALCIUM SERPL-MCNC: 9.3 MG/DL — SIGNIFICANT CHANGE UP (ref 8.5–10.1)
CALCIUM SERPL-MCNC: 9.4 MG/DL — SIGNIFICANT CHANGE UP (ref 8.5–10.1)
CHLORIDE SERPL-SCNC: 96 MMOL/L — SIGNIFICANT CHANGE UP (ref 96–108)
CHLORIDE SERPL-SCNC: 97 MMOL/L — SIGNIFICANT CHANGE UP (ref 96–108)
CHLORIDE SERPL-SCNC: 98 MMOL/L — SIGNIFICANT CHANGE UP (ref 96–108)
CHOLEST SERPL-MCNC: 97 MG/DL — SIGNIFICANT CHANGE UP
CO2 SERPL-SCNC: 24 MMOL/L — SIGNIFICANT CHANGE UP (ref 22–31)
CO2 SERPL-SCNC: 26 MMOL/L — SIGNIFICANT CHANGE UP (ref 22–31)
CO2 SERPL-SCNC: 30 MMOL/L — SIGNIFICANT CHANGE UP (ref 22–31)
CREAT SERPL-MCNC: 3.9 MG/DL — HIGH (ref 0.5–1.3)
CREAT SERPL-MCNC: 7.3 MG/DL — HIGH (ref 0.5–1.3)
CREAT SERPL-MCNC: 7.4 MG/DL — HIGH (ref 0.5–1.3)
EGFR: 15 ML/MIN/1.73M2 — LOW
EGFR: 7 ML/MIN/1.73M2 — LOW
EGFR: 7 ML/MIN/1.73M2 — LOW
ESTIMATED AVERAGE GLUCOSE: 108 MG/DL — SIGNIFICANT CHANGE UP (ref 68–114)
GLUCOSE SERPL-MCNC: 101 MG/DL — HIGH (ref 70–99)
GLUCOSE SERPL-MCNC: 96 MG/DL — SIGNIFICANT CHANGE UP (ref 70–99)
GLUCOSE SERPL-MCNC: 99 MG/DL — SIGNIFICANT CHANGE UP (ref 70–99)
HCT VFR BLD CALC: 28.4 % — LOW (ref 39–50)
HCT VFR BLD CALC: 29.4 % — LOW (ref 39–50)
HCT VFR BLD CALC: 29.8 % — LOW (ref 39–50)
HCT VFR BLD CALC: 33.4 % — LOW (ref 39–50)
HDLC SERPL-MCNC: 55 MG/DL — SIGNIFICANT CHANGE UP
HGB BLD-MCNC: 10 G/DL — LOW (ref 13–17)
HGB BLD-MCNC: 10.8 G/DL — LOW (ref 13–17)
HGB BLD-MCNC: 9.3 G/DL — LOW (ref 13–17)
HGB BLD-MCNC: 9.6 G/DL — LOW (ref 13–17)
LIPID PNL WITH DIRECT LDL SERPL: 27 MG/DL — SIGNIFICANT CHANGE UP
MAGNESIUM SERPL-MCNC: 2.5 MG/DL — SIGNIFICANT CHANGE UP (ref 1.6–2.6)
MCHC RBC-ENTMCNC: 32.3 G/DL — SIGNIFICANT CHANGE UP (ref 32–36)
MCHC RBC-ENTMCNC: 32.7 G/DL — SIGNIFICANT CHANGE UP (ref 32–36)
MCHC RBC-ENTMCNC: 32.7 G/DL — SIGNIFICANT CHANGE UP (ref 32–36)
MCHC RBC-ENTMCNC: 33.3 PG — SIGNIFICANT CHANGE UP (ref 27–34)
MCHC RBC-ENTMCNC: 33.4 PG — SIGNIFICANT CHANGE UP (ref 27–34)
MCHC RBC-ENTMCNC: 33.5 PG — SIGNIFICANT CHANGE UP (ref 27–34)
MCHC RBC-ENTMCNC: 33.6 G/DL — SIGNIFICANT CHANGE UP (ref 32–36)
MCHC RBC-ENTMCNC: 34.4 PG — HIGH (ref 27–34)
MCV RBC AUTO: 102.2 FL — HIGH (ref 80–100)
MCV RBC AUTO: 102.4 FL — HIGH (ref 80–100)
MCV RBC AUTO: 102.4 FL — HIGH (ref 80–100)
MCV RBC AUTO: 103.1 FL — HIGH (ref 80–100)
NON HDL CHOLESTEROL: 43 MG/DL — SIGNIFICANT CHANGE UP
NRBC # BLD: 0 /100 WBCS — SIGNIFICANT CHANGE UP (ref 0–0)
NT-PROBNP SERPL-SCNC: HIGH PG/ML (ref 0–450)
PLATELET # BLD AUTO: 289 K/UL — SIGNIFICANT CHANGE UP (ref 150–400)
PLATELET # BLD AUTO: 309 K/UL — SIGNIFICANT CHANGE UP (ref 150–400)
PLATELET # BLD AUTO: 343 K/UL — SIGNIFICANT CHANGE UP (ref 150–400)
PLATELET # BLD AUTO: 350 K/UL — SIGNIFICANT CHANGE UP (ref 150–400)
POTASSIUM SERPL-MCNC: 3.7 MMOL/L — SIGNIFICANT CHANGE UP (ref 3.5–5.3)
POTASSIUM SERPL-MCNC: 5.6 MMOL/L — HIGH (ref 3.5–5.3)
POTASSIUM SERPL-MCNC: 7 MMOL/L — CRITICAL HIGH (ref 3.5–5.3)
POTASSIUM SERPL-SCNC: 3.7 MMOL/L — SIGNIFICANT CHANGE UP (ref 3.5–5.3)
POTASSIUM SERPL-SCNC: 5.6 MMOL/L — HIGH (ref 3.5–5.3)
POTASSIUM SERPL-SCNC: 7 MMOL/L — CRITICAL HIGH (ref 3.5–5.3)
PROT SERPL-MCNC: 7.9 G/DL — SIGNIFICANT CHANGE UP (ref 6–8.3)
RBC # BLD: 2.78 M/UL — LOW (ref 4.2–5.8)
RBC # BLD: 2.87 M/UL — LOW (ref 4.2–5.8)
RBC # BLD: 2.91 M/UL — LOW (ref 4.2–5.8)
RBC # BLD: 3.24 M/UL — LOW (ref 4.2–5.8)
RBC # FLD: 14.4 % — SIGNIFICANT CHANGE UP (ref 10.3–14.5)
RBC # FLD: 14.5 % — SIGNIFICANT CHANGE UP (ref 10.3–14.5)
RBC # FLD: 14.5 % — SIGNIFICANT CHANGE UP (ref 10.3–14.5)
RBC # FLD: 14.6 % — HIGH (ref 10.3–14.5)
SODIUM SERPL-SCNC: 133 MMOL/L — LOW (ref 135–145)
SODIUM SERPL-SCNC: 134 MMOL/L — LOW (ref 135–145)
SODIUM SERPL-SCNC: 137 MMOL/L — SIGNIFICANT CHANGE UP (ref 135–145)
TRIGL SERPL-MCNC: 74 MG/DL — SIGNIFICANT CHANGE UP
TROPONIN I, HIGH SENSITIVITY RESULT: HIGH NG/L
TROPONIN I, HIGH SENSITIVITY RESULT: HIGH NG/L
TSH SERPL-MCNC: 2 UIU/ML — SIGNIFICANT CHANGE UP (ref 0.36–3.74)
TSH SERPL-MCNC: 3.03 UIU/ML — SIGNIFICANT CHANGE UP (ref 0.36–3.74)
WBC # BLD: 11.36 K/UL — HIGH (ref 3.8–10.5)
WBC # BLD: 15.27 K/UL — HIGH (ref 3.8–10.5)
WBC # BLD: 15.96 K/UL — HIGH (ref 3.8–10.5)
WBC # BLD: 16.43 K/UL — HIGH (ref 3.8–10.5)
WBC # FLD AUTO: 11.36 K/UL — HIGH (ref 3.8–10.5)
WBC # FLD AUTO: 15.27 K/UL — HIGH (ref 3.8–10.5)
WBC # FLD AUTO: 15.96 K/UL — HIGH (ref 3.8–10.5)
WBC # FLD AUTO: 16.43 K/UL — HIGH (ref 3.8–10.5)

## 2024-12-16 PROCEDURE — 93010 ELECTROCARDIOGRAM REPORT: CPT | Mod: 76

## 2024-12-16 RX ORDER — ACETAMINOPHEN 500MG 500 MG/1
1000 TABLET, COATED ORAL ONCE
Refills: 0 | Status: DISCONTINUED | OUTPATIENT
Start: 2024-12-16 | End: 2024-12-18

## 2024-12-16 RX ORDER — SEVELAMER CARBONATE 800 MG/1
800 TABLET, FILM COATED ORAL
Refills: 0 | Status: DISCONTINUED | OUTPATIENT
Start: 2024-12-16 | End: 2024-12-18

## 2024-12-16 RX ORDER — INSULIN REG, HUM S-S BUFF 100/ML
5 VIAL (ML) INJECTION ONCE
Refills: 0 | Status: COMPLETED | OUTPATIENT
Start: 2024-12-16 | End: 2024-12-16

## 2024-12-16 RX ORDER — HEPARIN SODIUM,PORCINE 1000/ML
800 VIAL (ML) INJECTION
Qty: 25000 | Refills: 0 | Status: DISCONTINUED | OUTPATIENT
Start: 2024-12-16 | End: 2024-12-16

## 2024-12-16 RX ORDER — CYANOCOBALAMIN/FOLIC AC/VIT B6 1-2.2-25MG
1 TABLET ORAL DAILY
Refills: 0 | Status: DISCONTINUED | OUTPATIENT
Start: 2024-12-16 | End: 2024-12-18

## 2024-12-16 RX ORDER — HEPARIN SODIUM,PORCINE 1000/ML
5600 VIAL (ML) INJECTION EVERY 6 HOURS
Refills: 0 | Status: DISCONTINUED | OUTPATIENT
Start: 2024-12-16 | End: 2024-12-17

## 2024-12-16 RX ORDER — HEPARIN SODIUM,PORCINE 1000/ML
1100 VIAL (ML) INJECTION
Qty: 25000 | Refills: 0 | Status: DISCONTINUED | OUTPATIENT
Start: 2024-12-16 | End: 2024-12-16

## 2024-12-16 RX ORDER — MAGNESIUM, ALUMINUM HYDROXIDE 200-225/5
30 SUSPENSION, ORAL (FINAL DOSE FORM) ORAL EVERY 4 HOURS
Refills: 0 | Status: DISCONTINUED | OUTPATIENT
Start: 2024-12-16 | End: 2024-12-18

## 2024-12-16 RX ORDER — HEPARIN SODIUM,PORCINE 1000/ML
800 VIAL (ML) INJECTION
Qty: 25000 | Refills: 0 | Status: DISCONTINUED | OUTPATIENT
Start: 2024-12-16 | End: 2024-12-17

## 2024-12-16 RX ORDER — CHLORHEXIDINE GLUCONATE 1.2 MG/ML
1 RINSE ORAL DAILY
Refills: 0 | Status: DISCONTINUED | OUTPATIENT
Start: 2024-12-16 | End: 2024-12-18

## 2024-12-16 RX ORDER — ONDANSETRON HYDROCHLORIDE 4 MG/1
4 TABLET, FILM COATED ORAL EVERY 8 HOURS
Refills: 0 | Status: DISCONTINUED | OUTPATIENT
Start: 2024-12-16 | End: 2024-12-18

## 2024-12-16 RX ORDER — ACETAMINOPHEN, DIPHENHYDRAMINE HCL, PHENYLEPHRINE HCL 325; 25; 5 MG/1; MG/1; MG/1
3 TABLET ORAL AT BEDTIME
Refills: 0 | Status: DISCONTINUED | OUTPATIENT
Start: 2024-12-16 | End: 2024-12-18

## 2024-12-16 RX ORDER — SODIUM ZIRCONIUM CYCLOSILICATE 5 G/5G
10 POWDER, FOR SUSPENSION ORAL ONCE
Refills: 0 | Status: COMPLETED | OUTPATIENT
Start: 2024-12-16 | End: 2024-12-16

## 2024-12-16 RX ADMIN — Medication 1100 UNIT(S)/HR: at 07:49

## 2024-12-16 RX ADMIN — METOPROLOL TARTRATE 25 MILLIGRAM(S): 100 TABLET, FILM COATED ORAL at 14:48

## 2024-12-16 RX ADMIN — Medication 5 UNIT(S): at 06:54

## 2024-12-16 RX ADMIN — CALCIUM ACETATE 1334 MILLIGRAM(S): 667 SOLUTION ORAL at 08:52

## 2024-12-16 RX ADMIN — Medication 800 UNIT(S)/HR: at 16:24

## 2024-12-16 RX ADMIN — Medication 1100 UNIT(S)/HR: at 14:42

## 2024-12-16 RX ADMIN — Medication 1000 UNIT(S)/HR: at 22:30

## 2024-12-16 RX ADMIN — PANTOPRAZOLE SODIUM 40 MILLIGRAM(S): 40 TABLET, DELAYED RELEASE ORAL at 08:53

## 2024-12-16 RX ADMIN — METOPROLOL TARTRATE 25 MILLIGRAM(S): 100 TABLET, FILM COATED ORAL at 21:26

## 2024-12-16 RX ADMIN — PAROXETINE HYDROCHLORIDE HEMIHYDRATE 40 MILLIGRAM(S): 37.5 TABLET, FILM COATED, EXTENDED RELEASE ORAL at 08:59

## 2024-12-16 RX ADMIN — Medication 30 MILLIGRAM(S): at 08:53

## 2024-12-16 RX ADMIN — POLYETHYLENE GLYCOL 3350 17 GRAM(S): 17 POWDER, FOR SOLUTION ORAL at 21:26

## 2024-12-16 RX ADMIN — CLOPIDOGREL 75 MILLIGRAM(S): 75 TABLET, FILM COATED ORAL at 08:52

## 2024-12-16 RX ADMIN — Medication 800 UNIT(S)/HR: at 19:21

## 2024-12-16 RX ADMIN — Medication 5 MILLIGRAM(S): at 08:54

## 2024-12-16 RX ADMIN — Medication 0.25 MILLIGRAM(S): at 21:27

## 2024-12-16 RX ADMIN — Medication 50 MILLILITER(S): at 06:59

## 2024-12-16 RX ADMIN — METOPROLOL TARTRATE 25 MILLIGRAM(S): 100 TABLET, FILM COATED ORAL at 05:45

## 2024-12-16 RX ADMIN — Medication 1100 UNIT(S)/HR: at 12:17

## 2024-12-16 RX ADMIN — Medication 175 MICROGRAM(S): at 05:45

## 2024-12-16 RX ADMIN — Medication UNIT(S)/HR: at 05:45

## 2024-12-16 RX ADMIN — SEVELAMER CARBONATE 800 MILLIGRAM(S): 800 TABLET, FILM COATED ORAL at 17:14

## 2024-12-16 RX ADMIN — Medication 1100 UNIT(S)/HR: at 07:46

## 2024-12-16 RX ADMIN — Medication 100 GRAM(S): at 06:59

## 2024-12-16 RX ADMIN — SODIUM ZIRCONIUM CYCLOSILICATE 10 GRAM(S): 5 POWDER, FOR SUSPENSION ORAL at 06:52

## 2024-12-16 RX ADMIN — Medication 80 MILLIGRAM(S): at 21:26

## 2024-12-16 NOTE — CAREGIVER ENGAGEMENT NOTE - CAREGIVER EDUCATION - INSTRUCTION REVIEW
Fiber is a substance found in many foods.  Most of it doesn't get digested, but it can affect how other foods are digested in our intestines.  It can also help soften bowel movements and relieve constipation.  Here are some foods high in fiber:    Cereals: Bran cereals (Fiber One, All Bran), Kashi GoLean, Grape Nuts  Fruits: Prunes, pears, strawberries, apples, dried fruits (raisins)  Vegetables: Beans, lentils, sweet potato, corn, peas  Nuts: almonds, peanuts    Drinking more water can help the fiber do its job and move stool along.    Some foods to avoid with constipation are milk, yogurt, cheese, and ice cream.     INCREASING FIBER IN CHILDS DIET      There are two kinds of fiber. Soluble fiber dissolves in water and when included in a diet low in total fat, can help lower blood cholesterol. (Sources: fruit, vegetables, barley, legumes, oats, and oat bran).  Insoluble fiber is used to help promote bowel regularity and may help reduce the risk of some forms of cancer.  (Sources: fruit, vegetables, cereals, whole-wheat products, and bran).    The recommended fiber intake in children age 3-18 is age plus five. Example: A six year old child would need age 6 +5 = 11 grams of fiber per day. Adding fiber to your childs diet may be a challenge. Below are some ways to add fiber to meals:    1. Offer baked goods containing whole grains like oatmeal cookies and bran muffins. Add chopped fruit to muffins, quick breads and pancakes.    2. For breakfast serve whole wheat breads and whole grain cereals. Look for cereals that contain at least 5 grams of fiber per serving and breads that contain at least 2 grams of fiber per slice.    3. Substitute whole-wheat flour for ¼ to ½ of white flour in recipes.    4. For high fiber snacks, serve popcorn, whole-wheat pretzels, or a trail mix consisting of dried fruits, unsalted nuts and bran cereals.    5. Add beans, split peas, lentils and whole grains to salads, soups, stews and  casseroles. Serve chili, baked beans, burritos and other bean dishes.    6. Add pureed beans to ground meat dishes and casseroles    7. For desserts and snacks, serve fresh, dried or stewed fruit.    8. Add bran cereal into hamburgers, meatloaf, chili, meatballs and casseroles.    8.  Score the apple until it is striped for more child appeal.    9.  Spread crunch peanut butter on apple slices or bananas    10.  Make fruit kabobs on Popsicle sticks.    11. Dip fruit in yogurt then nuts or favorite whole-grain cereal    12. Try raw veggies and dip or use bean dip for raw vegetables.    13. Do not remove the peel on fruits and vegetables    14. Add chopped celery, carrots, green pepper, etc. to tuna, chicken and other salads.    15. When making potato salad, do not peel the potatoes.  Make French fries, hash brown, etc from unpeeled potatoes.    16. Spread crunchy peanut butter on celery slices and top with raisins.    17. Make veggie kabobs on Popsicle sticks.    18. Top yogurt, frozen yogurt, or ice cream with granola, nuts, or favorite high fiber cereal    19. Place frozen yogurt between two oatmeal cookies    20. Use Bean dip for raw vegetables    21. Mix high fiber and low fiber cereals together such as Apple Jacks and Bran Chex    22. Make sandwiches using 1 slice whole wheat bread and 1 slice white bread    23. Make quesadillas with cheese and beans on whole-wheat tortillas.    24. Top pancakes, waffles or French toast with berries and nuts    25. Mix white and brown rice.    26. Add fresh vegetables to a wild rice or whole-wheat pasta salad.    27. Top Daren Crackers or Cookies with seeded preserves    28. Make desserts using crunchy peanut butter.    29. Make Rice Krispie treats with peanuts using other high fiber cereals.    30. Make popcorn balls with added dried fruit or nuts    31. Make trail mix using high-fiber ingredients.      Fiber Content of Foods in Grams      Fruit    Apple 1 medium with  skin 2.2gm  Banana 1 medium  2  Cherries. 20 each  2  Cantaloupe 1 ½ cups  2  Nectarine. 1 medium  2  Orange.1 medium  2  Peach, raw 1 ½  2  Pear, raw 1 medium  2  Prunes, stewed  3 oz  6.6  Raisins 3 oz   5.3  Strawberries 1 cup  2.8    Fruit-filled cereal bar 1 2      Vegetables    Broccoli, cooked ½ cup 2.8  Carrots, raw 1 medium 2.2  Cauliflower, cooked ½ cup 2.4  Celery 3-8 stalks  2  Corn, cooked ½ cup  2.3  Corn on Cob 5 ½ inch  2.  Green Beans, cooked ½ cup 2  Green Peas, cooked ½ cup 4.4  Potato with skin 1 medium 4.8  Spinach, cooked ½ cup 2.2  Squash, ¾ cup   2  Sweet Potato, ¼-1/2cup 2  Tomato, 1 ¼ medium  2    Dairy    Fruit Yogurt, 8 oz  1      Legumes    Beans, black ½ cup  3.6  Beans, baked  ½ cup  3.6  Beans, kidney ½ cup  3.2          Beans, baby sandra ½ cup  3.9  Beans, garbanzo6 Tbsp  2  Beans, refried, 5 Tbsp   2  Chili with Beans ¼ cup  2  Lentils, ½ cup    4      Nuts  Almonds, 1 oz    3  Cashews, 21 each   3  Peanuts, 1 oz    2.3  Pistachios, 32 nuts   2  Sunflower Seeds, 1 oz   2.8  [place] Halves, 1 oz   1.4  Crunchy Peanut Butter, 4 Tbsp 2      Cereals    All Bran ½ cup   10  Bran Chex, ½ cup   5  Bran Flakes, ¾ cup   3.9  CracklinOat Bran ½ cup  3.5  Cherrios, 2/3 cup   2  Fiber One, ¼ cup   6.5  Fruit n Fiber ½ cup   5  Frosted Mini Wheats  1/3 cup  2  Grape Nuts, ¼ cup   2  Granola, ¼ cup   2  Go Lean Crunch ½ cup  4  Mini Wheats with raisins, ¾ cup 5  Oatmeal, ¾ cup   3  Raisin Bran 1/3 cup   2  Shredded Wheat, 2/3 cup  2.8  Wheat Chex, ½ cup   2.5  100% Bran. 1/3 cup   8          Grains    Brown rice, ½ cup cooked  2  Cornbread, 2 square   2  Corn tortilla, 1 each   1  Wheat germ, ¼ cup   3.8  Whole grain/seeded bagel 1 each 2  Whole grain bread, 1 slice  2  Whole-grain crackers, 1-4  2  Whole grain muffin, 1 each  1  Whole-wheat spaghetti, ½ cup 3.2  Whole-wheat tortilla, each  4  Whole grain frozen waffle, 1 each 2          Desserts    Berry pie, 2 slices   2  Berry cobbler 8  oz   2  Fig or fruit bars cookies 2 each 2  Daren Crackers, 4 squares   2  Oatmeal raisin cookies, 4 each 2  Peanut butter cookie/nuts, 2  2  Whole grain fruit bars, 1 each  1      Snacks    Popcorn, 3½ cup air popped  4.2  Whole grain pretzels, 2 oz  2          When increasing fiber in the diet, drink plenty of fluids.  Add fiber slowly to the diet.  Adding fiber too quickly may cause gas, cramping, bloating or diarrhea                          Meets goals/outcomes

## 2024-12-16 NOTE — GOALS OF CARE CONVERSATION - ADVANCED CARE PLANNING - CONVERSATION DETAILS
PC team spoke pt. Discussed events leading to hospitalization from assisted living and diagnosis ESRD/dialysis. NSTEMI,HTN,. Inquired about his wishes about cardiopulmonary resuscitation and intubation. Explained to him CPR and possible/probable outcome. Pt states understanding and does not want CPR or intubation attempted. Is willing  l to have trial of any non invasive O2.  Gave consent to call his daughter Jn to inform of his decision. Jn agrees with pt decision. MOLST completed and placed on medical record

## 2024-12-16 NOTE — CASE MANAGEMENT PROGRESS NOTE - NSCMPROGRESSNOTE_GEN_ALL_CORE
CM left a message for the Wellness department at the [facility] [phone] requesting a return call.

## 2024-12-16 NOTE — CARE COORDINATION ASSESSMENT. - OTHER PERTINENT REFERRAL INFORMATION
SW met with pt at bedside, AOX3-4 with periods of confusion. Pt was in dialysis, answered some questions and deferred to daughter for remaining questions. SW spoke with pt's daughter Jn via telephone. Pt was admitted from the [facility] in [place]. Pt reports that he was able to independently ambulate with a RW. Pt has received PT at the [facility]; CM awaiting returned call from the [facility] about agency name of homecare. Pt goes to dialysis at [facility] on MWF at 9am (P#[phone], F#[phone]). SW to resume dialysis when cleared for DC.

## 2024-12-16 NOTE — CHART NOTE - NSCHARTNOTEFT_GEN_A_CORE
Called by RN for hyperkalemia, K = 7.0 (non-hemolyzed specimen). Patient has h/o ESRD, admitted for ACS rule out.   - STAT EKG  - STAT Calcium gluconate, Insulin 5u + d50  - remote tele  - repeat K in 4hrs   - Recommend confirmation of downtrending potassium prior to cardiac cath   - Nephro following Called by RN for hyperkalemia, K = 7.0 (non-hemolyzed specimen). Patient has h/o ESRD, admitted for ACS rule out.   - STAT EKG  - STAT Calcium gluconate, Insulin 5u + d50, lokelma  - remote tele  - repeat K in 4hrs   - Recommend f/u potassium levels prior to cardiac cath   - Nephro Attending made aware, plan for HD prior to cath. Agrees with above plan

## 2024-12-16 NOTE — PROGRESS NOTE ADULT - SUBJECTIVE AND OBJECTIVE BOX
[facility]                                                       Dr. Lassiter, Dr. Vega, Dr. Galloway, Dr. Piedra, Dr. Mccullough, Dr. Craft                                      [facility] [place], [facility], [address]                                                 [address] [address]                                      Ph: [phone]  Fax: [phone]                                         Ph: [phone]  Fax: [phone]      Patient is a 79y old  Male who presents with a chief complaint of chest pain (15 Dec 2024 05:23)  Patient seen in follow up for ESRD on HD.        PAST MEDICAL HISTORY:  ESRD on dialysis    Thyroid cancer    Lung cancer    HLD (hyperlipidemia)    Anxiety and depression    Acquired hypothyroidism    ESRD on dialysis      MEDICATIONS  (STANDING):  atorvastatin 80 milliGRAM(s) Oral at bedtime  calcium acetate 1334 milliGRAM(s) Oral three times a day with meals  chlorhexidine 2% Cloths 1 Application(s) Topical daily  clopidogrel Tablet 75 milliGRAM(s) Oral daily  finasteride 5 milliGRAM(s) Oral daily  heparin  Infusion. 1100 Unit(s)/Hr (11 mL/Hr) IV Continuous <Continuous>  isosorbide   mononitrate ER Tablet (IMDUR) 30 milliGRAM(s) Oral daily  levothyroxine 175 MICROGram(s) Oral daily  metoprolol tartrate 25 milliGRAM(s) Oral three times a day  pantoprazole    Tablet 40 milliGRAM(s) Oral daily  PARoxetine 40 milliGRAM(s) Oral daily  polyethylene glycol 3350 17 Gram(s) Oral at bedtime    MEDICATIONS  (PRN):  acetaminophen     Tablet .. 650 milliGRAM(s) Oral every 6 hours PRN Temp greater or equal to 38C (100.4F), Mild Pain (1 - 3)  acetaminophen   IVPB .. 1000 milliGRAM(s) IV Intermittent once PRN Temp greater or equal to 38C (100.4F), Moderate Pain (4 - 6)  ALPRAZolam 0.25 milliGRAM(s) Oral every 8 hours PRN anxiety  aluminum hydroxide/magnesium hydroxide/simethicone Suspension 30 milliLiter(s) Oral every 4 hours PRN Dyspepsia  bisacodyl Suppository 10 milliGRAM(s) Rectal daily PRN Constipation  heparin   Injectable 5600 Unit(s) IV Push every 6 hours PRN For aPTT less than 40  melatonin 3 milliGRAM(s) Oral at bedtime PRN Insomnia  morphine  - Injectable 1 milliGRAM(s) IV Push every 2 hours PRN Moderate Pain (4 - 6)  nitroglycerin     SubLingual 0.4 milliGRAM(s) SubLingual every 5 minutes PRN Chest Pain  ondansetron Injectable 4 milliGRAM(s) IV Push every 8 hours PRN Nausea and/or Vomiting    T(C): 36.7 (12-16-24 @ 12:30), Max: 37.3 (12-16-24 @ 05:00)  HR: 69 (12-16-24 @ 12:30) (69 - 107)  BP: 129/68 (12-16-24 @ 12:30) (124/64 - 167/74)  RR: 18 (12-16-24 @ 12:30) (18 - 22)  SpO2: 98% (12-16-24 @ 12:30) (92% - 98%)  Wt(kg): --  I&O's Detail    15 Dec 2024 07:01  -  16 Dec 2024 07:00  --------------------------------------------------------  IN:  Total IN: 0 mL    OUT:    Voided (mL): 200 mL  Total OUT: 200 mL    Total NET: -200 mL      16 Dec 2024 07:01  -  16 Dec 2024 13:39  --------------------------------------------------------  IN:  Total IN: 0 mL    OUT:    Other (mL): 2000 mL    Voided (mL): 200 mL  Total OUT: 2200 mL    Total NET: -2200 mL          PHYSICAL EXAM:  General: No distress  Respiratory: b/l air entry  Cardiovascular: S1 S2  Gastrointestinal: soft  Extremities:  no edema, left AVF                              10.8   15.96 )-----------( 343      ( 16 Dec 2024 05:00 )             33.4     12-16    134[L]  |  98  |  83[H]  ----------------------------<  96  5.6[H]   |  24  |  7.40[H]    Ca    9.1      16 Dec 2024 09:30  Phos  6.0     12-14  Mg     2.5     12-16    TPro  7.9  /  Alb  3.6  /  TBili  0.3  /  DBili  x   /  AST  77[H]  /  ALT  31  /  AlkPhos  92  12-16        LIVER FUNCTIONS - ( 16 Dec 2024 05:00 )  Alb: 3.6 g/dL / Pro: 7.9 g/dL / ALK PHOS: 92 U/L / ALT: 31 U/L / AST: 77 U/L / GGT: x           Urinalysis Basic - ( 16 Dec 2024 09:30 )    Color: x / Appearance: x / SG: x / pH: x  Gluc: 96 mg/dL / Ketone: x  / Bili: x / Urobili: x   Blood: x / Protein: x / Nitrite: x   Leuk Esterase: x / RBC: x / WBC x   Sq Epi: x / Non Sq Epi: x / Bacteria: x        Sodium, Serum: 134 (12-16 @ 09:30)  Sodium, Serum: 133 (12-16 @ 05:00)  Sodium, Serum: 135 (12-14 @ 23:21)    Creatinine, Serum: 7.40 (12-16 @ 09:30)  Creatinine, Serum: 7.30 (12-16 @ 05:00)  Creatinine, Serum: 5.80 (12-14 @ 23:21)    Potassium, Serum: 5.6 (12-16 @ 09:30)  Potassium, Serum: 7.0 (12-16 @ 05:00)  Potassium, Serum: 4.6 (12-14 @ 23:21)    Hemoglobin: 10.8 (12-16 @ 05:00)  Hemoglobin: 10.7 (12-15 @ 06:23)  Hemoglobin: 10.1 (12-14 @ 23:21)

## 2024-12-16 NOTE — PROGRESS NOTE ADULT - SUBJECTIVE AND OBJECTIVE BOX
PROGRESS NOTE  Patient is a 79y old  Male who presents with a chief complaint of chest pain (15 Dec 2024 05:23)      OVERNIGHT      HPI:     CHIEF COMPLAINT.   . 12/15/2024 Patient BIBA from [facility], complaining of nausea and vomiting and HTN earlier today, has left arm dialysis (MWF), states has not missed any appointments, 18g right hand, given 4mg of zofran prior    OVERALL PRESENTATION.  . 12/15/2024 79-year-old male with past med history of HTN, HLD, dialysis with left arm fistula, Monday Wednesday Friday dialysis who presents emergency department for chest pain.  This started just as patient was going to sleep associate with nausea vomiting pain rating down his left upper extremity as well as to his jaw and neck.  Patient has never had pain like this before.  Patient is supposed to be taking aspirin, however stopped because he did not want to take it anymore.  Denies history of stents.  Discussed with interventional cardiology recommending heparin, aspirin, Brilinta and repeat EKG   (15 Dec 2024 08:18)    PAST MEDICAL & SURGICAL HISTORY:  ESRD on dialysis      Thyroid cancer      Lung cancer      HLD (hyperlipidemia)      Anxiety and depression      Acquired hypothyroidism      ESRD on dialysis          MEDICATIONS  (STANDING):  atorvastatin 80 milliGRAM(s) Oral at bedtime  calcium acetate 1334 milliGRAM(s) Oral three times a day with meals  chlorhexidine 2% Cloths 1 Application(s) Topical daily  clopidogrel Tablet 75 milliGRAM(s) Oral daily  finasteride 5 milliGRAM(s) Oral daily  heparin  Infusion. 1100 Unit(s)/Hr (11 mL/Hr) IV Continuous <Continuous>  isosorbide   mononitrate ER Tablet (IMDUR) 30 milliGRAM(s) Oral daily  levothyroxine 175 MICROGram(s) Oral daily  metoprolol tartrate 25 milliGRAM(s) Oral three times a day  pantoprazole    Tablet 40 milliGRAM(s) Oral daily  PARoxetine 40 milliGRAM(s) Oral daily  polyethylene glycol 3350 17 Gram(s) Oral at bedtime    MEDICATIONS  (PRN):  acetaminophen     Tablet .. 650 milliGRAM(s) Oral every 6 hours PRN Temp greater or equal to 38C (100.4F), Mild Pain (1 - 3)  acetaminophen   IVPB .. 1000 milliGRAM(s) IV Intermittent once PRN Temp greater or equal to 38C (100.4F), Moderate Pain (4 - 6)  ALPRAZolam 0.25 milliGRAM(s) Oral every 8 hours PRN anxiety  aluminum hydroxide/magnesium hydroxide/simethicone Suspension 30 milliLiter(s) Oral every 4 hours PRN Dyspepsia  bisacodyl Suppository 10 milliGRAM(s) Rectal daily PRN Constipation  heparin   Injectable 5600 Unit(s) IV Push every 6 hours PRN For aPTT less than 40  melatonin 3 milliGRAM(s) Oral at bedtime PRN Insomnia  morphine  - Injectable 1 milliGRAM(s) IV Push every 2 hours PRN Moderate Pain (4 - 6)  nitroglycerin     SubLingual 0.4 milliGRAM(s) SubLingual every 5 minutes PRN Chest Pain  ondansetron Injectable 4 milliGRAM(s) IV Push every 8 hours PRN Nausea and/or Vomiting      OBJECTIVE    T(C): 36.7 (12-16-24 @ 12:30), Max: 37.3 (12-16-24 @ 05:00)  HR: 69 (12-16-24 @ 12:30) (69 - 78)  BP: 129/68 (12-16-24 @ 12:30) (124/64 - 167/74)  RR: 18 (12-16-24 @ 12:30) (18 - 19)  SpO2: 98% (12-16-24 @ 12:30) (92% - 98%)  Wt(kg): --  I&O's Summary    15 Dec 2024 07:01  -  16 Dec 2024 07:00  --------------------------------------------------------  IN: 0 mL / OUT: 200 mL / NET: -200 mL    16 Dec 2024 07:01  -  16 Dec 2024 12:49  --------------------------------------------------------  IN: 0 mL / OUT: 2200 mL / NET: -2200 mL          REVIEW OF SYSTEMS:  CONSTITUTIONAL: No fever, weight loss, or fatigue  EYES: No eye pain, visual disturbances, or discharge  ENMT:   No sinus or throat pain  NECK: No pain or stiffness  RESPIRATORY: No cough, wheezing, chills or hemoptysis; No shortness of breath  CARDIOVASCULAR: No chest pain, palpitations, dizziness, or leg swelling  GASTROINTESTINAL: No abdominal pain. No nausea, vomiting; No diarrhea or constipation. No melena or hematochezia.  GENITOURINARY: No dysuria, frequency, hematuria, or incontinence  NEUROLOGICAL: No headaches, memory loss, loss of strength, numbness, or tremors  SKIN: No itching, burning, rashes, or lesions   MUSCULOSKELETAL: No joint pain or swelling; No muscle, back, or extremity pain    PHYSICAL EXAM:  Appearance: NAD. VS past 24 hrs -as above   HEENT:   Moist oral mucosa. Conjunctiva clear b/l.   Neck : supple  Respiratory: Lungs CTAB.  Gastrointestinal:  Soft, nontender. No rebound. No rigidity. BS present	  Cardiovascular: RRR ,S1S2 present  Neurologic: Non-focal. Moving all extremities.  Extremities: No edema. No erythema. No calf tenderness.  Skin: No rashes, No ecchymoses, No cyanosis.	  wounds ,skin lesions-See skin assesment flow sheet   LABS:                        10.8   15.96 )-----------( 343      ( 16 Dec 2024 05:00 )             33.4     12-16    134[L]  |  98  |  83[H]  ----------------------------<  96  5.6[H]   |  24  |  7.40[H]    Ca    9.1      16 Dec 2024 09:30  Phos  6.0     12-14  Mg     2.5     12-16    TPro  7.9  /  Alb  3.6  /  TBili  0.3  /  DBili  x   /  AST  77[H]  /  ALT  31  /  AlkPhos  92  12-16    CAPILLARY BLOOD GLUCOSE      POCT Blood Glucose.: 121 mg/dL (16 Dec 2024 08:08)  POCT Blood Glucose.: 92 mg/dL (16 Dec 2024 06:46)    PT/INR - ( 15 Dec 2024 00:25 )   PT: 12.5 sec;   INR: 1.06 ratio         PTT - ( 16 Dec 2024 10:55 )  PTT:62.5 sec  Urinalysis Basic - ( 16 Dec 2024 09:30 )    Color: x / Appearance: x / SG: x / pH: x  Gluc: 96 mg/dL / Ketone: x  / Bili: x / Urobili: x   Blood: x / Protein: x / Nitrite: x   Leuk Esterase: x / RBC: x / WBC x   Sq Epi: x / Non Sq Epi: x / Bacteria: x        RADIOLOGY & ADDITIONAL TESTS:   reviewed elctronically  ASSESSMENT/PLAN: 	     PROGRESS NOTE -IM INITIAL EVALUATION   Patient is a 79y old  Male who presents with a chief complaint of chest pain (15 Dec 2024 05:23)  Chart and available morning labs /imaging are reviewed electronically , urgent issues addressed . More information  is being added upon completion of rounds , when more information is collected and management discussed with consultants , medical staff and social service/case management on the floor   OVERNIGHT  No new issues reported by medical staff . All above noted Patient is resting in a bed comfortably .Seen during HD , no complains  .No distress noted   Spoke to the daughter on a phone      CHIEF COMPLAINT.   . 12/15/2024 Patient BIBA from [facility], complaining of nausea and vomiting and HTN earlier today, has left arm dialysis (MWF), states has not missed any appointments, 18g right hand, given 4mg of zofran prior    OVERALL PRESENTATION.  . 12/15/2024 79-year-old male with past med history of HTN, HLD, dialysis with left arm fistula, Monday Wednesday Friday dialysis who presents emergency department for chest pain.  This started just as patient was going to sleep associate with nausea vomiting pain rating down his left upper extremity as well as to his jaw and neck.  Patient has never had pain like this before.  Patient is supposed to be taking aspirin, however stopped because he did not want to take it anymore.  Denies history of stents.  Discussed with interventional cardiology recommending heparin, aspirin, Brilinta and repeat EKG   (15 Dec 2024 08:18)    PAST MEDICAL & SURGICAL HISTORY:  ESRD on dialysis      Thyroid cancer      Lung cancer      HLD (hyperlipidemia)      Anxiety and depression      Acquired hypothyroidism      ESRD on dialysis          MEDICATIONS  (STANDING):  atorvastatin 80 milliGRAM(s) Oral at bedtime  calcium acetate 1334 milliGRAM(s) Oral three times a day with meals  chlorhexidine 2% Cloths 1 Application(s) Topical daily  clopidogrel Tablet 75 milliGRAM(s) Oral daily  finasteride 5 milliGRAM(s) Oral daily  heparin  Infusion. 1100 Unit(s)/Hr (11 mL/Hr) IV Continuous <Continuous>  isosorbide   mononitrate ER Tablet (IMDUR) 30 milliGRAM(s) Oral daily  levothyroxine 175 MICROGram(s) Oral daily  metoprolol tartrate 25 milliGRAM(s) Oral three times a day  pantoprazole    Tablet 40 milliGRAM(s) Oral daily  PARoxetine 40 milliGRAM(s) Oral daily  polyethylene glycol 3350 17 Gram(s) Oral at bedtime    MEDICATIONS  (PRN):  acetaminophen     Tablet .. 650 milliGRAM(s) Oral every 6 hours PRN Temp greater or equal to 38C (100.4F), Mild Pain (1 - 3)  acetaminophen   IVPB .. 1000 milliGRAM(s) IV Intermittent once PRN Temp greater or equal to 38C (100.4F), Moderate Pain (4 - 6)  ALPRAZolam 0.25 milliGRAM(s) Oral every 8 hours PRN anxiety  aluminum hydroxide/magnesium hydroxide/simethicone Suspension 30 milliLiter(s) Oral every 4 hours PRN Dyspepsia  bisacodyl Suppository 10 milliGRAM(s) Rectal daily PRN Constipation  heparin   Injectable 5600 Unit(s) IV Push every 6 hours PRN For aPTT less than 40  melatonin 3 milliGRAM(s) Oral at bedtime PRN Insomnia  morphine  - Injectable 1 milliGRAM(s) IV Push every 2 hours PRN Moderate Pain (4 - 6)  nitroglycerin     SubLingual 0.4 milliGRAM(s) SubLingual every 5 minutes PRN Chest Pain  ondansetron Injectable 4 milliGRAM(s) IV Push every 8 hours PRN Nausea and/or Vomiting      OBJECTIVE    T(C): 36.7 (12-16-24 @ 12:30), Max: 37.3 (12-16-24 @ 05:00)  HR: 69 (12-16-24 @ 12:30) (69 - 78)  BP: 129/68 (12-16-24 @ 12:30) (124/64 - 167/74)  RR: 18 (12-16-24 @ 12:30) (18 - 19)  SpO2: 98% (12-16-24 @ 12:30) (92% - 98%)  Wt(kg): --  I&O's Summary    15 Dec 2024 07:01  -  16 Dec 2024 07:00  --------------------------------------------------------  IN: 0 mL / OUT: 200 mL / NET: -200 mL    16 Dec 2024 07:01  -  16 Dec 2024 12:49  --------------------------------------------------------  IN: 0 mL / OUT: 2200 mL / NET: -2200 mL          REVIEW OF SYSTEMS:  CONSTITUTIONAL: No fever, weight loss, or fatigue  EYES: No eye pain, visual disturbances, or discharge  ENMT:   No sinus or throat pain  NECK: No pain or stiffness  RESPIRATORY: No cough, wheezing, chills or hemoptysis; No shortness of breath  CARDIOVASCULAR: No chest pain, palpitations, dizziness, or leg swelling  GASTROINTESTINAL: No abdominal pain. No nausea, vomiting; No diarrhea or constipation. No melena or hematochezia.  GENITOURINARY: No dysuria, frequency, hematuria, or incontinence  NEUROLOGICAL: No headaches, memory loss, loss of strength, numbness, or tremors  SKIN: No itching, burning, rashes, or lesions   MUSCULOSKELETAL: No joint pain or swelling; No muscle, back, or extremity pain    PHYSICAL EXAM:  Appearance: NAD. VS past 24 hrs -as above   HEENT:   Moist oral mucosa. Conjunctiva clear b/l.   Neck : supple  Respiratory: Lungs CTAB.  Gastrointestinal:  Soft, nontender. No rebound. No rigidity. BS present	  Cardiovascular: RRR ,S1S2 present  Neurologic: Non-focal. Moving all extremities.  Extremities: No edema. No erythema. No calf tenderness.  Skin: No rashes, No ecchymoses, No cyanosis.	  wounds ,skin lesions-See skin assesment flow sheet   LABS:                        10.8   15.96 )-----------( 343      ( 16 Dec 2024 05:00 )             33.4     12-16    134[L]  |  98  |  83[H]  ----------------------------<  96  5.6[H]   |  24  |  7.40[H]    Ca    9.1      16 Dec 2024 09:30  Phos  6.0     12-14  Mg     2.5     12-16    TPro  7.9  /  Alb  3.6  /  TBili  0.3  /  DBili  x   /  AST  77[H]  /  ALT  31  /  AlkPhos  92  12-16    CAPILLARY BLOOD GLUCOSE      POCT Blood Glucose.: 121 mg/dL (16 Dec 2024 08:08)  POCT Blood Glucose.: 92 mg/dL (16 Dec 2024 06:46)    PT/INR - ( 15 Dec 2024 00:25 )   PT: 12.5 sec;   INR: 1.06 ratio         PTT - ( 16 Dec 2024 10:55 )  PTT:62.5 sec  Urinalysis Basic - ( 16 Dec 2024 09:30 )    Color: x / Appearance: x / SG: x / pH: x  Gluc: 96 mg/dL / Ketone: x  / Bili: x / Urobili: x   Blood: x / Protein: x / Nitrite: x   Leuk Esterase: x / RBC: x / WBC x   Sq Epi: x / Non Sq Epi: x / Bacteria: x        RADIOLOGY & ADDITIONAL TESTS: < from: Xray Chest 1 View- PORTABLE-Urgent (12.14.24 @ 23:44) >  The cardiomediastinal silhouette is not enlarged. Mild elevation left   hemidiaphragm. Suboptimal degree of inspiration with bronchovascular   crowding. Question nodule like density right midlung field. Otherwise, no   focal lung consolidation or sizable pleural effusion. No significant   osseous abnormality. Surgical clips overlie the neck soft tissues.    IMPRESSION:    Suboptimal degree of inspiration with bronchovascular crowding. Question   nodule like density right midlung field. Follow-up chest PA and lateral   with better positioning and better degree of inspiration recommended.    < end of copied text >  < from: TTE W or WO Ultrasound Enhancing Agent (12.15.24 @ 11:29) >  Admission Status:  Inpatient  Study Information: Image quality for this study is fair.    _______________________________________________________________________________________     CONCLUSIONS:      1. Left ventricular wall thickness is mildly increased. Left ventricular systolic function is normal with an ejection fraction of 59 % by Barnett's method of disks. Regional wall motion abnormalities present.   2. Left atrium is mildly dilated.    ________________________________________________________________________________________  FINDINGS:     Left Ventricle:  The left ventricular cavity is normal in size. Left ventricular wall thickness is mildly increased. Left ventricular systolic function is normal with a calculated ejection fraction of 59 % by the Barnett's biplane method of disks. There are regional wall motion abnormalities present. Left ventricular regional wall motion assessment reveals hypokinesis of themid inferolateral wall. The left ventricular diastolic function is indeterminate.     Right Ventricle:  The right ventricular cavity is normal in size and right ventricular systolic function is normal. Tricuspid annular plane systolic excursion (TAPSE) is 1.9 cm (normal >=1.7 cm).     Left Atrium:  The left atrium is mildly dilated with an indexed volume of 38.43 ml/m².     Right Atrium:  The right atrium is normal in size.     Interatrial Septum:  The interatrial septum appears intact.     Aortic Valve:  There is fibrocalcific aortic valve sclerosis without stenosis. The peak transaortic velocity is 1.73 m/s, peak transaortic gradient is 12.0 mmHg and mean transaortic gradient is 7.0 mmHg with an LVOT/aortic valve VTI ratio of 0.50. The aortic valve area is estimated at 2.45 cm² by the continuity equation and 1.52 cm² by 2D planimetry. There is trace aortic regurgitation.     Mitral Valve:  There is calcification of the mitral valve annulus. The transmitral peak gradient is 12.1 mmHg andmean gradient is 4.00 mmHg. There is trace mitral regurgitation.     Tricuspid Valve:  Structurally normal tricuspid valve with normal leaflet excursion. There is trace tricuspid regurgitation. There is insufficient tricuspid regurgitation detected to calculate pulmonary artery systolic pressure.     Pulmonic Valve:  The pulmonic valve was not well visualized. There is trace pulmonic regurgitation.     Aorta:  The ascending aorta and aortic arch are not well visualized. The aortic root at the sinuses of Valsalva is normal in size, measuring 3.50 cm (indexed 1.70 cm/m²).     Systemic Veins:  The inferior vena cava is normal in size measuring 1.73 cm in diameter, (normal <2.1cm) with normal inspiratory collapse (normal >50%) consistent with normal right atrial pressure (~3, range 0-5mmHg).  ____________________________________________________________________  QUANTITATIVE DATA:  Left Ventricle Measurements: (Indexed to BSA)     IVSd (2D):   1.4 cm  LVPWd (2D):  1.1 cm  LVIDd (2D):  5.1 cm  LVIDs (2D):  3.7 cm  LV Mass:     255 g  124.2 g/m²  LV Vol d, MOD A2C: 98.1 ml 47.79 ml/m²  LV Vol d, MOD A4C: 67.9 ml 33.07 ml/m²  LV Vol d, MOD BP:  82.5 ml 40.19 ml/m²  LV Vol s, MOD A2C: 38.4 ml 18.71 ml/m²  LV Vol s, MOD A4C: 27.9 ml 13.59 ml/m²  LV Vol s, MOD BP:  33.7 ml 16.44 ml/m²  LVOT SV MOD BP:    48.8 ml  LV EF% MOD BP:     59 %     MV E Vmax:    1.46 m/s  MV A Vmax:    1.38 m/s  MV E/A:       1.06  e' lateral:   6.85 cm/s  e' medial:    7.62 cm/s  E/e' lateral: 21.31  E/e' medial:  19.16  E/e' Average: 20.18  MV DT:        227 msec    Aorta Measurements: (Normal range) (Indexed to BSA)     Ao Root d 3.50 cm (3.1 - 3.7 cm) 1.70 cm/m²            Left Atrium Measurements: (Indexed to BSA)  LA Diam 2D: 4.10 cm         Right Ventricle Measurements: Right Atrial Measurements:     TAPSE:            1.9 cm      RA Vol s, MOD A4C  22.7 ml  RV Base (RVID1):  3.2 cm      RA Area s, MOD A4C 11.4 cm²  RV Mid (RVID2):   3.2 cm  RV Major (RVID3): 7.3 cm       LVOT / RVOT/ Qp/Qs Data: (Indexed to BSA)  LVOT Diameter:  2.50 cm  LVOT Area:      4.91 cm²  LVOT Vmax:      0.84 m/s  LVOT Vmn:       0.558 m/s  LVOT VTI:       18.40 cm  LVOT peak grad: 3 mmHg  LVOT mean grad: 1.0 mmHg  LVOT SV:        90.3 ml   44.00 ml/m²    Aortic Valve Measurements:  AV Vmax:                1.7 m/s  AV Peak Gradient:       12.0 mmHg  AV Mean Gradient:       7.0 mmHg  AV VTI:                 36.9 cm  AV VTI Ratio:           0.50  AoV EOA, Contin:        2.45 cm²  AoV EOA, Contin i:      1.19 cm²/m²  AoV area, 2D planim:    1.52 cm²  AoV Dimensionless Index 0.50    Mitral Valve Measurements:     MV Vmax:      1.74 m/s  MV VTI, araceli   0.469 m  MV Mean Grad: 4.0 mmHg  MV Peak Grad: 12.1 mmHg  MV E Vmax:    1.5 m/s  MV A Vmax:    1.4 m/s  MV E/A:       1.1    < end of copied text >     reviewed elctronically  ASSESSMENT/PLAN: 	  55minutes spent on this visit, 50% visit time spent in care co-ordination with other attendings and counselling patient ,writing admission orders ( see complete and current orders and order section) ,requesting necessary consults ,informing family about status & plan of care .I have discussed care plan with [facility] wellness/admitting /nursing   department ,outpatient PCP , hospital consultants , ER physician & med staff .

## 2024-12-16 NOTE — PROVIDER CONTACT NOTE (CRITICAL VALUE NOTIFICATION) - ACTION/TREATMENT ORDERED:
MD made aware; MD made aware; Hyperkalemia Protocol followed. 25% Dextrose IV Push, Humulin R Insulin, Calcium Gluconate, Lokelmia 10 mG ordered and administered to Pt.

## 2024-12-16 NOTE — PROGRESS NOTE ADULT - ASSESSMENT
ESRD on HD MWF at [facility] (Dr Vega)  Hyperkalemia  Hyperphosphatemia  Chest pain    HD today. Low potassium bath. Dietary compliance advised. Trend BP and titrate BP meds as needed. Phos binders.   Add lokelma on non dialysis days on discharge. Cardiology follow up.

## 2024-12-16 NOTE — CONSULT NOTE ADULT - SUBJECTIVE AND OBJECTIVE BOX
Department of Cardiology                                                               Division of Interventional Cardiology                                                               [facility]/[address] [address]                                                                                 [phone]                                                                                                                               Interventional Cardiology Consult / Pre-Procedure Note    Subjective/ROS:   Denies CP, SOB, palpitations, N/V, fever/chills, abd pain, numbness/tingling/weakness, other c/o at this time.  ROS negative x 10 systems except as documented as above.       CHIEF COMPLAINT.   . 12/15/2024 Patient BIBA from [facility], complaining of nausea and vomiting and HTN earlier today, has left arm dialysis (MWF), states has not missed any appointments, 18g right hand, given 4mg of zofran prior    HPI:  . 12/15/2024 -A 79-year-old male with past med history of HTN, HLD, dialysis with left arm fistula,  dialysis who presents emergency department for chest pain.  This started just as patient was going to sleep associate with nausea vomiting pain rating down his left upper extremity as well as to his jaw and neck.  Patient has never had pain like this before.  Patient is supposed to be taking aspirin, however stopped because he did not want to take it anymore.  Denies history of stents.  Discussed with interventional cardiology recommending heparin, aspirin, Brilinta and repeat EKG   (15 Dec 2024 08:18)    24- Interventional Cardiology requested to evaluate pt for a LHC in am secondary to the aforementioned HPI / CP associated with N&V and radiating down LUE, jaw & neck. now NSTEMI. & RWMA on TTE. Pt's last HD tx was today with a resultant K level down to 3.7 from 7.0 He receives HD every --. Discussed LHC with pt and he is agreeable to procedure in am.    PAST MEDICAL & SURGICAL HISTORY:  ESRD on dialysis      Thyroid cancer      Lung cancer      HLD (hyperlipidemia)      Anxiety and depression      Acquired hypothyroidism      ESRD on dialysis        FAMILY HISTORY:  No pertinent family history in first degree relatives      Social History:  Resides at the [facility] Denies tobacco, etoh or elicit drug use      Echo:     TRANSTHORACIC ECHOCARDIOGRAM REPORT  ________________________________________________________________________________                                      _______       Pt. Name:       WOLFGANG GRANDA Study Date:    12/15/2024  MRN:            [id number]     YOB: 1945  Accession #:    [id number]     Age:           79 years  Account#:       [id number]    Gender:        M  Heart Rate:                   Height:        67.00 in (170.18 cm)  Rhythm:                       Weight:        207.25 lb (94.01 kg)  Blood Pressure: 140/73 mmHg   BSA/BMI:       2.05 m² / 32.46 kg/m²  ________________________________________________________________________________________  Referring Physician:    [id number] Lynn Higgins  Interpreting Physician: Jeyson Barrera  Primary Sonographer:    Jaron Shook    CPT:               ECHO TTE WO CON COMP W DOPP - 34538.m  Indication(s):     Cardiac murmur, unspecified - R01.1  Procedure:         Transthoracic echocardiogram with 2-D, M-mode and complete                 spectral and color flow Doppler.  Ordering Location: [facility]  Admission Status:  Inpatient  Study Information: Image quality for this study is fair.    _______________________________________________________________________________________     CONCLUSIONS:      1. Left ventricular wall thickness is mildly increased. Left ventricular systolic function is normal with an ejection fraction of 59 % by Barnett's method of disks. Regional wall motion abnormalities present.   2. Left atrium is mildly dilated.    ________________________________________________________________________________________  FINDINGS:     Left Ventricle:  The left ventricular cavity is normal in size. Left ventricular wall thickness is mildly increased. Left ventricular systolic function is normal with a calculated ejection fraction of 59 % by the Barnett's biplane method of disks. There are regional wall motion abnormalities present. Left ventricular regional wall motion assessment reveals hypokinesis of themid inferolateral wall. The left ventricular diastolic function is indeterminate.          Associated Risk Factors:        Frailty Screening: (N/A, mild, mod, severe)       Cerebrovascular Disease: N/A       Chronic Lung Disease: N/A       Peripheral Arterial Disease: N/A       Chronic Kidney Disease (if yes, what is GFR): N/A       Uncontrolled Diabetes (if yes, what is HgbA1C or FBS): N/A       Poorly Controlled Hypertension (if yes, what is SBP): N/A       Morbid Obesity (if yes, what is BMI): N/A       History of Recent Ventricular Arrhythmia: N/A       Inability to Ambulate Safely: N/A       Need for Therapeutic Anticoagulation: N/A       Antiplatelet or Contrast Allergy: N/A      MEDICATIONS  (STANDING):  atorvastatin 80 milliGRAM(s) Oral at bedtime  calcium acetate 1334 milliGRAM(s) Oral three times a day with meals  chlorhexidine 2% Cloths 1 Application(s) Topical daily  clopidogrel Tablet 75 milliGRAM(s) Oral daily  finasteride 5 milliGRAM(s) Oral daily  heparin  Infusion. 800 Unit(s)/Hr (8 mL/Hr) IV Continuous <Continuous>  isosorbide   mononitrate ER Tablet (IMDUR) 30 milliGRAM(s) Oral daily  levothyroxine 175 MICROGram(s) Oral daily  metoprolol tartrate 25 milliGRAM(s) Oral three times a day  multivitamin 1 Tablet(s) Oral daily  pantoprazole    Tablet 40 milliGRAM(s) Oral daily  PARoxetine 40 milliGRAM(s) Oral daily  polyethylene glycol 3350 17 Gram(s) Oral at bedtime  sevelamer carbonate 800 milliGRAM(s) Oral three times a day with meals    MEDICATIONS  (PRN):  acetaminophen     Tablet .. 650 milliGRAM(s) Oral every 6 hours PRN Temp greater or equal to 38C (100.4F), Mild Pain (1 - 3)  acetaminophen   IVPB .. 1000 milliGRAM(s) IV Intermittent once PRN Temp greater or equal to 38C (100.4F), Moderate Pain (4 - 6)  ALPRAZolam 0.25 milliGRAM(s) Oral every 8 hours PRN anxiety  aluminum hydroxide/magnesium hydroxide/simethicone Suspension 30 milliLiter(s) Oral every 4 hours PRN Dyspepsia  bisacodyl Suppository 10 milliGRAM(s) Rectal daily PRN Constipation  heparin   Injectable 5600 Unit(s) IV Push every 6 hours PRN For aPTT less than 40  melatonin 3 milliGRAM(s) Oral at bedtime PRN Insomnia  morphine  - Injectable 1 milliGRAM(s) IV Push every 2 hours PRN Moderate Pain (4 - 6)  nitroglycerin     SubLingual 0.4 milliGRAM(s) SubLingual every 5 minutes PRN Chest Pain  ondansetron Injectable 4 milliGRAM(s) IV Push every 8 hours PRN Nausea and/or Vomiting      No Known Allergies        T(C): 36.7 (24 @ 12:30), Max: 37.3 (24 @ 05:00)  HR: 69 (24 @ 12:30) (69 - 78)  BP: 129/68 (24 @ 12:30) (124/64 - 167/74)  RR: 18 (24 @ 12:30) (18 - 19)  SpO2: 98% (24 @ 12:30) (92% - 98%)  Wt(kg): --    I&O's Summary    15 Dec 2024 07:  -  16 Dec 2024 07:00  --------------------------------------------------------  IN: 0 mL / OUT: 200 mL / NET: -200 mL    16 Dec 2024 07:01  -  16 Dec 2024 15:37  --------------------------------------------------------  IN: 0 mL / OUT: 2200 mL / NET: -2200 mL        Daily     Daily Weight in k.2 (16 Dec 2024 12:30)      TELEMETRY: 	  NSR 70s    ECG:  	  Ventricular Rate 103 BPM    Atrial Rate 103 BPM    P-R Interval 190 ms    QRS Duration 106 ms    Q-T Interval 362 ms    QTC Calculation(Bazett) 474 ms    P Axis 58 degrees    R Axis 13 degrees    T Axis 150 degrees    Diagnosis Line Sinus tachycardia  Minimal voltage criteria for LVH, may be normal variant ( [place] product )  ST & T wave abnormality, consider inferolateral ischemia  Abnormal ECG  When compared with ECG of 14-DEC-2024 23:59, (Unconfirmed)  Non-specific change in ST segment in Inferior leads  ST less depressed in Lateral leads  Confirmed by Estevan Combs MD ([id number]) on 12/15/2024 12:35:11 PM      LABS:	 	                        10.0   15.27 )-----------( 309      ( 16 Dec 2024 14:21 )             29.8     12-16    137  |  97  |  36[H]  ----------------------------<  101[H]  3.7   |  30  |  3.90[H]    Ca    9.4      16 Dec 2024 13:30  Phos  6.0     12-14  Mg     2.5     12-16    TPro  7.9  /  Alb  3.6  /  TBili  0.3  /  DBili  x   /  AST  77[H]  /  ALT  31  /  AlkPhos  92  12-16          Constitutional: NAD  Neuro: A+O x 3, non-focal, speech clear and intact  HEENT: NC/AT, PERRL, EOMI, anicteric sclerae, oral mucosa pink and moist  Neck: supple, no JVD  CV: regular rate, regular rhythm, +S1S2, no murmurs or rub  Pulm/chest: lung sounds CTA and equal bilaterally, no accessory muscle use noted  Abd: soft, NT, ND, +BS  Ext: NGUYEN x 4, no C/C/E LUE A_V fistula  Pulses: R radial 2+, R femoral 2+, bilat DP 2+  Skin: warm, well perfused  Psych: calm, appropriate affect                                                                                 Department of Cardiology                                                               Division of Interventional Cardiology                                                               [facility]/[address] [address]                                                                                 [phone]                                                                                                                               Interventional Cardiology Consult / Pre-Procedure Note    Subjective/ROS:   Denies CP, SOB, palpitations, N/V, fever/chills, abd pain, numbness/tingling/weakness, other c/o at this time.  ROS negative x 10 systems except as documented as above.       CHIEF COMPLAINT.   . 12/15/2024 Patient BIBA from [facility], complaining of nausea and vomiting and HTN earlier today, has left arm dialysis (MWF), states has not missed any appointments, 18g right hand, given 4mg of zofran prior    HPI:  . 12/15/2024 -A 79-year-old male with past med history of HTN, HLD, dialysis with left arm fistula,  dialysis who presents emergency department for chest pain.  This started just as patient was going to sleep associate with nausea vomiting pain rating down his left upper extremity as well as to his jaw and neck.  Patient has never had pain like this before.  Patient is supposed to be taking aspirin, however stopped because he did not want to take it anymore.  Denies history of stents.  Discussed with interventional cardiology recommending heparin, aspirin, Brilinta and repeat EKG   (15 Dec 2024 08:18)    24- Interventional Cardiology requested to evaluate pt for a LHC in am secondary to the aforementioned HPI / CP associated with N&V and radiating down LUE, jaw & neck. now NSTEMI.elevated trops now down trending from 12/15,  & RWMA on TTE. Pt's last HD tx was today with a resultant K level down to 3.7 from 7.0 He receives HD every M--. Discussed LHC with pt and he is agreeable to procedure in am.    PAST MEDICAL & SURGICAL HISTORY:  ESRD on dialysis      Thyroid cancer      Lung cancer      HLD (hyperlipidemia)      Anxiety and depression      Acquired hypothyroidism      ESRD on dialysis        FAMILY HISTORY:  No pertinent family history in first degree relatives      Social History:  Resides at the [facility] Denies tobacco, etoh or elicit drug use      Echo:     TRANSTHORACIC ECHOCARDIOGRAM REPORT  ________________________________________________________________________________                                      _______       Pt. Name:       WOLFGANG GRANDA Study Date:    12/15/2024  MRN:            [id number]     YOB: 1945  Accession #:    [id number]     Age:           79 years  Account#:       [id number]    Gender:        M  Heart Rate:                   Height:        67.00 in (170.18 cm)  Rhythm:                       Weight:        207.25 lb (94.01 kg)  Blood Pressure: 140/73 mmHg   BSA/BMI:       2.05 m² / 32.46 kg/m²  ________________________________________________________________________________________  Referring Physician:    [id number] Lynn Higgins  Interpreting Physician: Jeyson Barrera  Primary Sonographer:    Jaron Shook    CPT:               ECHO TTE WO CON COMP W DOPP - 52239.m  Indication(s):     Cardiac murmur, unspecified - R01.1  Procedure:         Transthoracic echocardiogram with 2-D, M-mode and complete                 spectral and color flow Doppler.  Ordering Location: [facility]  Admission Status:  Inpatient  Study Information: Image quality for this study is fair.    _______________________________________________________________________________________     CONCLUSIONS:      1. Left ventricular wall thickness is mildly increased. Left ventricular systolic function is normal with an ejection fraction of 59 % by Barnett's method of disks. Regional wall motion abnormalities present.   2. Left atrium is mildly dilated.    ________________________________________________________________________________________  FINDINGS:     Left Ventricle:  The left ventricular cavity is normal in size. Left ventricular wall thickness is mildly increased. Left ventricular systolic function is normal with a calculated ejection fraction of 59 % by the Barnett's biplane method of disks. There are regional wall motion abnormalities present. Left ventricular regional wall motion assessment reveals hypokinesis of themid inferolateral wall. The left ventricular diastolic function is indeterminate.          Associated Risk Factors:        Frailty Screening: (N/A, mild, mod, severe)       Cerebrovascular Disease: N/A       Chronic Lung Disease: N/A       Peripheral Arterial Disease: N/A       Chronic Kidney Disease (if yes, what is GFR): N/A       Uncontrolled Diabetes (if yes, what is HgbA1C or FBS): N/A       Poorly Controlled Hypertension (if yes, what is SBP): N/A       Morbid Obesity (if yes, what is BMI): N/A       History of Recent Ventricular Arrhythmia: N/A       Inability to Ambulate Safely: N/A       Need for Therapeutic Anticoagulation: N/A       Antiplatelet or Contrast Allergy: N/A      MEDICATIONS  (STANDING):  atorvastatin 80 milliGRAM(s) Oral at bedtime  calcium acetate 1334 milliGRAM(s) Oral three times a day with meals  chlorhexidine 2% Cloths 1 Application(s) Topical daily  clopidogrel Tablet 75 milliGRAM(s) Oral daily  finasteride 5 milliGRAM(s) Oral daily  heparin  Infusion. 800 Unit(s)/Hr (8 mL/Hr) IV Continuous <Continuous>  isosorbide   mononitrate ER Tablet (IMDUR) 30 milliGRAM(s) Oral daily  levothyroxine 175 MICROGram(s) Oral daily  metoprolol tartrate 25 milliGRAM(s) Oral three times a day  multivitamin 1 Tablet(s) Oral daily  pantoprazole    Tablet 40 milliGRAM(s) Oral daily  PARoxetine 40 milliGRAM(s) Oral daily  polyethylene glycol 3350 17 Gram(s) Oral at bedtime  sevelamer carbonate 800 milliGRAM(s) Oral three times a day with meals    MEDICATIONS  (PRN):  acetaminophen     Tablet .. 650 milliGRAM(s) Oral every 6 hours PRN Temp greater or equal to 38C (100.4F), Mild Pain (1 - 3)  acetaminophen   IVPB .. 1000 milliGRAM(s) IV Intermittent once PRN Temp greater or equal to 38C (100.4F), Moderate Pain (4 - 6)  ALPRAZolam 0.25 milliGRAM(s) Oral every 8 hours PRN anxiety  aluminum hydroxide/magnesium hydroxide/simethicone Suspension 30 milliLiter(s) Oral every 4 hours PRN Dyspepsia  bisacodyl Suppository 10 milliGRAM(s) Rectal daily PRN Constipation  heparin   Injectable 5600 Unit(s) IV Push every 6 hours PRN For aPTT less than 40  melatonin 3 milliGRAM(s) Oral at bedtime PRN Insomnia  morphine  - Injectable 1 milliGRAM(s) IV Push every 2 hours PRN Moderate Pain (4 - 6)  nitroglycerin     SubLingual 0.4 milliGRAM(s) SubLingual every 5 minutes PRN Chest Pain  ondansetron Injectable 4 milliGRAM(s) IV Push every 8 hours PRN Nausea and/or Vomiting      No Known Allergies        T(C): 36.7 (24 @ 12:30), Max: 37.3 (24 @ 05:00)  HR: 69 (24 @ 12:30) (69 - 78)  BP: 129/68 (24 @ 12:30) (124/64 - 167/74)  RR: 18 (24 @ 12:30) (18 - 19)  SpO2: 98% (24 @ 12:30) (92% - 98%)  Wt(kg): --    I&O's Summary    15 Dec 2024 07:  -  16 Dec 2024 07:00  --------------------------------------------------------  IN: 0 mL / OUT: 200 mL / NET: -200 mL    16 Dec 2024 07:  -  16 Dec 2024 15:37  --------------------------------------------------------  IN: 0 mL / OUT: 2200 mL / NET: -2200 mL        Daily     Daily Weight in k.2 (16 Dec 2024 12:30)      TELEMETRY: 	  NSR 70s    ECG:  	  Ventricular Rate 103 BPM    Atrial Rate 103 BPM    P-R Interval 190 ms    QRS Duration 106 ms    Q-T Interval 362 ms    QTC Calculation(Bazett) 474 ms    P Axis 58 degrees    R Axis 13 degrees    T Axis 150 degrees    Diagnosis Line Sinus tachycardia  Minimal voltage criteria for LVH, may be normal variant ( [place] product )  ST & T wave abnormality, consider inferolateral ischemia  Abnormal ECG  When compared with ECG of 14-DEC-2024 23:59, (Unconfirmed)  Non-specific change in ST segment in Inferior leads  ST less depressed in Lateral leads  Confirmed by Estevan Combs MD ([id number]) on 12/15/2024 12:35:11 PM      LABS:	 	                        10.0   15.27 )-----------( 309      ( 16 Dec 2024 14:21 )             29.8     12-16    137  |  97  |  36[H]  ----------------------------<  101[H]  3.7   |  30  |  3.90[H]    Ca    9.4      16 Dec 2024 13:30  Phos  6.0     12-14  Mg     2.5     12-16    TPro  7.9  /  Alb  3.6  /  TBili  0.3  /  DBili  x   /  AST  77[H]  /  ALT  31  /  AlkPhos  92  12-16          Constitutional: NAD  Neuro: A+O x 3, non-focal, speech clear and intact  HEENT: NC/AT, PERRL, EOMI, anicteric sclerae, oral mucosa pink and moist  Neck: supple, no JVD  CV: regular rate, regular rhythm, +S1S2, no murmurs or rub  Pulm/chest: lung sounds CTA and equal bilaterally, no accessory muscle use noted  Abd: soft, NT, ND, +BS  Ext: NGUYEN x 4, no C/C/E LUE A_V fistula  Pulses: R radial 2+, R femoral 2+, bilat DP 2+  Skin: warm, well perfused  Psych: calm, appropriate affect

## 2024-12-16 NOTE — PROGRESS NOTE ADULT - SUBJECTIVE AND OBJECTIVE BOX
Asked by Dr. Higgins to see this patient for leukocytosis and possible sepsis  The patient declined to be seen.

## 2024-12-16 NOTE — PROGRESS NOTE ADULT - ASSESSMENT
79-year-old male with past med history of HTN, HLD, dialysis with left arm fistula, Monday Wednesday Friday dialysis who presents emergency department for chest pain.  This started just as patient was going to sleep associate with nausea vomiting pain rating down his left upper extremity as well as to his jaw and neck.  Patient has never had pain like this before.  Patient is supposed to be taking aspirin, however stopped because he did not want to take it anymore.  Denies history of stents.  Discussed with interventional cardiology recommending heparin, aspirin, Brilinta and repeat EKG. 12/15/2024 79-year-old male with past med history of HTN, HLD, dialysis with left arm fistula, Monday Wednesday Friday dialysis who presents emergency department for chest pain.  This started just as patient was going to sleep associate with nausea vomiting pain rating down his left upper extremity as well as to his jaw and neck.  Patient has never had pain like this before.  Patient is supposed to be taking aspirin, however stopped because he did not want to take it anymore.  Denies history of stents.  Discussed with interventional cardiology recommending heparin, aspirin, Brilinta and repeat EKG  .... w 12/15/2024 w 12.3   .... No obvious infn noted   .... Trop 12/14 - 12/15 Tr [id number]   .... 12/15/2024 plavx 75   .... 12/14 11p iv ufh   .... 12/15/2024 ATORVASTAT 80   .... 12/15/2024 IMDUR 30   .... 12/15/2024 METOPROLOL 25.3   .... 12/15/2024 MS 1 Q 2 h p  .... pbnp   .... Hb 12/15/2024 Hb 10.7   .... Plt 12/15/2024 Plt 240   .... 12/15/2024 On iv ufh   .... monitor   . ESRD  .... Na 12/15/2024 na 135  .... K 12/15/2024 K 4.6   .... CO2 12/15/2024 co2 28   .... AG 12/15/2024 ag 8   .... Alb 12/15/2024 alb 3.4   .... Cr 12/15/2024 Cr 5.8   .... Ca acetate 1334 x 3   .... HD as per renal   . BPH  .... 12/15/2024 FINASTAERIDE 5  ENDO.  .... 12/15/2024 LEVOXYL 175  NEURO.  .... 12/15/2024 Paxil 40   .... 12/15/2024 alprazolam .25 x 3 p   . 12/15/2024 chest discomfort  . 12/15/2024 Nausea vomiting   PROBLEMS .  . NON TAYLA MI   . ESRD   PMH.  . HTN,   . HLD,   . dialysis with left arm

## 2024-12-16 NOTE — PROGRESS NOTE ADULT - SUBJECTIVE AND OBJECTIVE BOX
CHIEF COMPLAINT/ REASON FOR VISIT  .. Patient was seen to address the  issue listed under PROBLEM LIST which is located toward bottom of this note     WOLFGANG GRANDA    PLV TELE 307 W1    Allergies    No Known Allergies    Intolerances        PAST MEDICAL & SURGICAL HISTORY:  ESRD on dialysis          FAMILY HISTORY:      Home Medications:      MEDICATIONS  (STANDING):  atorvastatin 80 milliGRAM(s) Oral at bedtime  calcium acetate 1334 milliGRAM(s) Oral three times a day with meals  clopidogrel Tablet 75 milliGRAM(s) Oral daily  finasteride 5 milliGRAM(s) Oral daily  heparin  Infusion. 1100 Unit(s)/Hr (11 mL/Hr) IV Continuous <Continuous>  isosorbide   mononitrate ER Tablet (IMDUR) 30 milliGRAM(s) Oral daily  levothyroxine 175 MICROGram(s) Oral daily  metoprolol tartrate 25 milliGRAM(s) Oral three times a day  pantoprazole    Tablet 40 milliGRAM(s) Oral daily  PARoxetine 40 milliGRAM(s) Oral daily  polyethylene glycol 3350 17 Gram(s) Oral at bedtime    MEDICATIONS  (PRN):  acetaminophen     Tablet .. 650 milliGRAM(s) Oral every 6 hours PRN Temp greater or equal to 38C (100.4F), Mild Pain (1 - 3)  ALPRAZolam 0.25 milliGRAM(s) Oral every 8 hours PRN anxiety  heparin   Injectable 5600 Unit(s) IV Push every 6 hours PRN For aPTT less than 40  morphine  - Injectable 1 milliGRAM(s) IV Push every 2 hours PRN Moderate Pain (4 - 6)  nitroglycerin     SubLingual 0.4 milliGRAM(s) SubLingual every 5 minutes PRN Chest Pain      Diet, DASH/TLC:   Sodium & Cholesterol Restricted  For patients receiving Renal Replacement - No Protein Restr, No Conc K, No Conc Phos, Low Sodium (RENAL) (12-15-24 @ 14:44) [Active]          Vital Signs Last 24 Hrs  T(C): 37.3 (16 Dec 2024 06:48), Max: 37.3 (16 Dec 2024 05:00)  T(F): 99.2 (16 Dec 2024 06:48), Max: 99.2 (16 Dec 2024 05:00)  HR: 73 (16 Dec 2024 08:41) (71 - 78)  BP: 139/68 (16 Dec 2024 08:41) (139/68 - 167/74)  BP(mean): --  RR: 19 (16 Dec 2024 08:41) (18 - 19)  SpO2: 94% (16 Dec 2024 08:41) (92% - 97%)    Parameters below as of 16 Dec 2024 08:41  Patient On (Oxygen Delivery Method): nasal cannula  O2 Flow (L/min): 2        12-15-24 @ 07:01  -  12-16-24 @ 07:00  --------------------------------------------------------  IN: 0 mL / OUT: 200 mL / NET: -200 mL    12-16-24 @ 07:01  -  12-16-24 @ 09:48  --------------------------------------------------------  IN: 0 mL / OUT: 200 mL / NET: -200 mL              LABS:                        10.8   15.96 )-----------( 343      ( 16 Dec 2024 05:00 )             33.4     12-16    133[L]  |  96  |  83[H]  ----------------------------<  99  7.0[HH]   |  26  |  7.30[H]    Ca    9.3      16 Dec 2024 05:00  Phos  6.0     12-14  Mg     2.5     12-16    TPro  7.9  /  Alb  3.6  /  TBili  0.3  /  DBili  x   /  AST  77[H]  /  ALT  31  /  AlkPhos  92  12-16    PT/INR - ( 15 Dec 2024 00:25 )   PT: 12.5 sec;   INR: 1.06 ratio         PTT - ( 16 Dec 2024 05:00 )  PTT:83.2 sec  Urinalysis Basic - ( 16 Dec 2024 05:00 )    Color: x / Appearance: x / SG: x / pH: x  Gluc: 99 mg/dL / Ketone: x  / Bili: x / Urobili: x   Blood: x / Protein: x / Nitrite: x   Leuk Esterase: x / RBC: x / WBC x   Sq Epi: x / Non Sq Epi: x / Bacteria: x            WBC:  WBC Count: 15.96 K/uL (12-16 @ 05:00)  WBC Count: 12.37 K/uL (12-15 @ 06:23)  WBC Count: 10.66 K/uL (12-14 @ 23:21)      MICROBIOLOGY:  RECENT CULTURES:              PT/INR - ( 15 Dec 2024 00:25 )   PT: 12.5 sec;   INR: 1.06 ratio         PTT - ( 16 Dec 2024 05:00 )  PTT:83.2 sec    Sodium:  Sodium: 133 mmol/L (12-16 @ 05:00)  Sodium: 135 mmol/L (12-14 @ 23:21)      7.30 mg/dL 12-16 @ 05:00  5.80 mg/dL 12-14 @ 23:21      Hemoglobin:  Hemoglobin: 10.8 g/dL (12-16 @ 05:00)  Hemoglobin: 10.7 g/dL (12-15 @ 06:23)  Hemoglobin: 10.1 g/dL (12-14 @ 23:21)      Platelets: Platelet Count - Automated: 343 K/uL (12-16 @ 05:00)  Platelet Count - Automated: 340 K/uL (12-15 @ 06:23)  Platelet Count - Automated: 306 K/uL (12-14 @ 23:21)      LIVER FUNCTIONS - ( 16 Dec 2024 05:00 )  Alb: 3.6 g/dL / Pro: 7.9 g/dL / ALK PHOS: 92 U/L / ALT: 31 U/L / AST: 77 U/L / GGT: x             Urinalysis Basic - ( 16 Dec 2024 05:00 )    Color: x / Appearance: x / SG: x / pH: x  Gluc: 99 mg/dL / Ketone: x  / Bili: x / Urobili: x   Blood: x / Protein: x / Nitrite: x   Leuk Esterase: x / RBC: x / WBC x   Sq Epi: x / Non Sq Epi: x / Bacteria: x        RADIOLOGY & ADDITIONAL STUDIES:      MICROBIOLOGY:  RECENT CULTURES:

## 2024-12-16 NOTE — CAREGIVER ENGAGEMENT NOTE - CAREGIVER EDUCATION NOTES - FREE TEXT
SW spoke with Pt and daughter Jn. Explained SW role with assistance of resumption of dialysis upon DC. SW will continue to follow.

## 2024-12-16 NOTE — CARE COORDINATION ASSESSMENT. - NSCAREPROVIDERS_GEN_ALL_CORE_FT
CARE PROVIDERS:  Accepting Physician: Lynn Higgins  Administration: Reno Lassiter  Administration: Guerrero Link  Administration: Adri Herman  Administration: Abisai Aguero  Admitting: Lynn Higgins  Attending: Lynn Higgins  Case Management: Becki Wong  Consultant: Andrei Garcia  Consultant: Bradford Gonzalez  Consultant: Francisco Bernal  Consultant: Qian Mccullough  Consultant: Pahlavan, Mohsen  Consultant: Brandon Lassiter  Consultant: Elier Vazquez  Covering Team: Teja Bennett  Covering Team: Lauro Kapoor  Covering Team: Jasiel Medel ED Attending: Bentley Contreras ED Nurse: Suzanne Hughes  Nurse: Gabriela Herrera  Nurse: Deniz Yi  Nurse: Checo Heredia  Nurse: Bae, Hyeonjeong  Ordered: Doctor, Unknown  Ordered: Brandon Lassiter  Outpatient Provider: Brandon Lassiter  Override: Gabriela Herrera  Override: Teresa Marmolejo  PCA/Nursing Assistant: Regine Bocanegra  PCA/Nursing Assistant: Katerine Zhao  Primary Team: Francisco Bernal  Primary Team: Melania Mace  Registered Dietitian: Oralia Jones  Research: Jeyson Barrera  Respiratory Therapy: Clarke Lowe  : Hyacinth Medina  : Carroll Humphrey

## 2024-12-16 NOTE — CONSULT NOTE ADULT - ASSESSMENT
A 79-year-old male with past med history of HTN, HLD,ESRD on  dialysis with left arm fistula, Monday Wednesday Friday dialysis who presents emergency department for chest pain.  This started just as patient was going to sleep associate with nausea vomiting pain rating down his left upper extremity as well as to his jaw and neck. now on telemetry with NSTEMI, CP resolved. TTE reveals normal LV function EF 58% with WMA.  1) Continue HD every Mon-Wed-Fri as per Nephrology/ aware of LHC in am -will prescribe any other HD tx as needed  2) NPO after midnight for possible LHC in am with Dr Vazquez pending labs/ K+ level  3) All of above D/W pt who is amenable to procedure  4) Continue high intensity statin, BB, plavix & isosorbide as ordered A 79-year-old male with past med history of HTN, HLD,ESRD on  dialysis with left arm fistula, Monday Wednesday Friday dialysis who presents emergency department for chest pain.  This started just as patient was going to sleep associate with nausea vomiting pain rating down his left upper extremity as well as to his jaw and neck. now on telemetry with NSTEMI, CP resolved. TTE reveals normal LV function EF 58% with WMA. Elevated trops peaked 12/15, now down trending.  1) Continue HD every Mon-Wed-Fri as per Nephrology/ aware of LHC in am -will prescribe any other HD tx as needed  2) NPO after midnight for possible LHC in am with Dr Vazquez pending labs/ K+ level  3) All of above D/W pt who is amenable to procedure  4) Continue high intensity statin, BB, plavix & isosorbide as ordered

## 2024-12-17 ENCOUNTER — TRANSCRIPTION ENCOUNTER (OUTPATIENT)
Age: 79
End: 2024-12-17

## 2024-12-17 PROBLEM — N18.6 END STAGE RENAL DISEASE: Chronic | Status: ACTIVE | Noted: 2024-12-15

## 2024-12-17 LAB
ALBUMIN SERPL ELPH-MCNC: 3.1 G/DL — LOW (ref 3.3–5)
ALP SERPL-CCNC: 76 U/L — SIGNIFICANT CHANGE UP (ref 40–120)
ALT FLD-CCNC: 23 U/L — SIGNIFICANT CHANGE UP (ref 12–78)
ANION GAP SERPL CALC-SCNC: 7 MMOL/L — SIGNIFICANT CHANGE UP (ref 5–17)
APTT BLD: 61.7 SEC — HIGH (ref 24.5–35.6)
AST SERPL-CCNC: 44 U/L — HIGH (ref 15–37)
BASOPHILS # BLD AUTO: 0.03 K/UL — SIGNIFICANT CHANGE UP (ref 0–0.2)
BASOPHILS NFR BLD AUTO: 0.3 % — SIGNIFICANT CHANGE UP (ref 0–2)
BILIRUB SERPL-MCNC: 0.5 MG/DL — SIGNIFICANT CHANGE UP (ref 0.2–1.2)
BUN SERPL-MCNC: 52 MG/DL — HIGH (ref 7–23)
CALCIUM SERPL-MCNC: 9 MG/DL — SIGNIFICANT CHANGE UP (ref 8.5–10.1)
CHLORIDE SERPL-SCNC: 97 MMOL/L — SIGNIFICANT CHANGE UP (ref 96–108)
CO2 SERPL-SCNC: 32 MMOL/L — HIGH (ref 22–31)
CREAT SERPL-MCNC: 5.6 MG/DL — HIGH (ref 0.5–1.3)
EGFR: 10 ML/MIN/1.73M2 — LOW
EOSINOPHIL # BLD AUTO: 0.09 K/UL — SIGNIFICANT CHANGE UP (ref 0–0.5)
EOSINOPHIL NFR BLD AUTO: 0.9 % — SIGNIFICANT CHANGE UP (ref 0–6)
GLUCOSE SERPL-MCNC: 94 MG/DL — SIGNIFICANT CHANGE UP (ref 70–99)
HCT VFR BLD CALC: 28.9 % — LOW (ref 39–50)
HGB BLD-MCNC: 9.4 G/DL — LOW (ref 13–17)
IMM GRANULOCYTES NFR BLD AUTO: 0.3 % — SIGNIFICANT CHANGE UP (ref 0–0.9)
INR BLD: 1.36 RATIO — HIGH (ref 0.85–1.16)
LYMPHOCYTES # BLD AUTO: 1.47 K/UL — SIGNIFICANT CHANGE UP (ref 1–3.3)
LYMPHOCYTES # BLD AUTO: 15.2 % — SIGNIFICANT CHANGE UP (ref 13–44)
MCHC RBC-ENTMCNC: 32.5 G/DL — SIGNIFICANT CHANGE UP (ref 32–36)
MCHC RBC-ENTMCNC: 33.6 PG — SIGNIFICANT CHANGE UP (ref 27–34)
MCV RBC AUTO: 103.2 FL — HIGH (ref 80–100)
MONOCYTES # BLD AUTO: 0.85 K/UL — SIGNIFICANT CHANGE UP (ref 0–0.9)
MONOCYTES NFR BLD AUTO: 8.8 % — SIGNIFICANT CHANGE UP (ref 2–14)
NEUTROPHILS # BLD AUTO: 7.23 K/UL — SIGNIFICANT CHANGE UP (ref 1.8–7.4)
NEUTROPHILS NFR BLD AUTO: 74.5 % — SIGNIFICANT CHANGE UP (ref 43–77)
NRBC # BLD: 0 /100 WBCS — SIGNIFICANT CHANGE UP (ref 0–0)
PLATELET # BLD AUTO: 307 K/UL — SIGNIFICANT CHANGE UP (ref 150–400)
POTASSIUM SERPL-MCNC: 4.3 MMOL/L — SIGNIFICANT CHANGE UP (ref 3.5–5.3)
POTASSIUM SERPL-SCNC: 4.3 MMOL/L — SIGNIFICANT CHANGE UP (ref 3.5–5.3)
PROT SERPL-MCNC: 7.1 G/DL — SIGNIFICANT CHANGE UP (ref 6–8.3)
PROTHROM AB SERPL-ACNC: 15.9 SEC — HIGH (ref 9.9–13.4)
RBC # BLD: 2.8 M/UL — LOW (ref 4.2–5.8)
RBC # FLD: 14.7 % — HIGH (ref 10.3–14.5)
SODIUM SERPL-SCNC: 136 MMOL/L — SIGNIFICANT CHANGE UP (ref 135–145)
WBC # BLD: 9.7 K/UL — SIGNIFICANT CHANGE UP (ref 3.8–10.5)
WBC # FLD AUTO: 9.7 K/UL — SIGNIFICANT CHANGE UP (ref 3.8–10.5)

## 2024-12-17 PROCEDURE — 93458 L HRT ARTERY/VENTRICLE ANGIO: CPT | Mod: 26,59

## 2024-12-17 PROCEDURE — 93010 ELECTROCARDIOGRAM REPORT: CPT

## 2024-12-17 PROCEDURE — 92978 ENDOLUMINL IVUS OCT C 1ST: CPT | Mod: 26,LC

## 2024-12-17 PROCEDURE — 99152 MOD SED SAME PHYS/QHP 5/>YRS: CPT

## 2024-12-17 PROCEDURE — 92928 PRQ TCAT PLMT NTRAC ST 1 LES: CPT | Mod: LC

## 2024-12-17 PROCEDURE — 93010 ELECTROCARDIOGRAM REPORT: CPT | Mod: 77

## 2024-12-17 RX ORDER — 0.9 % SODIUM CHLORIDE 0.9 %
1000 INTRAVENOUS SOLUTION INTRAVENOUS
Refills: 0 | Status: DISCONTINUED | OUTPATIENT
Start: 2024-12-17 | End: 2024-12-18

## 2024-12-17 RX ORDER — CLOPIDOGREL 75 MG/1
75 TABLET, FILM COATED ORAL EVERY 24 HOURS
Refills: 0 | Status: DISCONTINUED | OUTPATIENT
Start: 2024-12-18 | End: 2024-12-18

## 2024-12-17 RX ADMIN — Medication 1000 UNIT(S)/HR: at 06:00

## 2024-12-17 RX ADMIN — Medication 1000 UNIT(S)/HR: at 07:29

## 2024-12-17 RX ADMIN — SEVELAMER CARBONATE 800 MILLIGRAM(S): 800 TABLET, FILM COATED ORAL at 17:56

## 2024-12-17 RX ADMIN — Medication 20 MILLILITER(S): at 08:52

## 2024-12-17 RX ADMIN — SEVELAMER CARBONATE 800 MILLIGRAM(S): 800 TABLET, FILM COATED ORAL at 08:50

## 2024-12-17 RX ADMIN — Medication 175 MICROGRAM(S): at 05:38

## 2024-12-17 RX ADMIN — METOPROLOL TARTRATE 25 MILLIGRAM(S): 100 TABLET, FILM COATED ORAL at 05:39

## 2024-12-17 NOTE — DISCHARGE NOTE PROVIDER - REASON FOR NO CARDIAC REHABILITATION
Post-Anesthesia Evaluation and Assessment    Patient: Dunia Mendez MRN: [id number]  SSN: [id number]    YOB: 1933  Age: 80 y.o. Sex: female       Cardiovascular Function/Vital Signs  Visit Vitals    BP (!) 181/94 (BP 1 Location: Left arm, BP Patient Position: At rest;Supine)    Pulse 72    Temp 36.4 °C (97.5 °F)    Resp 22    Ht 5' 7\" (1.702 m)    Wt 43.1 kg (95 lb)    SpO2 (!) 87%    BMI 14.88 kg/m2       Patient is status post total IV anesthesia anesthesia for Procedure(s):  DEEP HARDWARE REMOVAL LEFT ELBOW/ EXCISION OF GANGLION CYST LEFT ELBOWCHOICE. Nausea/Vomiting: None    Postoperative hydration reviewed and adequate. Pain:  Pain Scale 1: Numeric (0 - 10) (11/15/17 1407)  Pain Intensity 1: 0 (11/15/17 1407)   Managed    Neurological Status:   Neuro (WDL): Within Defined Limits (11/15/17 1407)  Neuro  LUE Motor Response: Pharmacologically paralyzed (11/15/17 1247)  LLE Motor Response: Purposeful (11/15/17 1247)  RUE Motor Response: Purposeful (11/15/17 1247)  RLE Motor Response: Purposeful (11/15/17 1247)   At baseline    Mental Status and Level of Consciousness: Arousable    Pulmonary Status:   O2 Device: CO2 nasal cannula (11/15/17 1345)   Adequate oxygenation and airway patent    Complications related to anesthesia: None    Post-anesthesia assessment completed.  No concerns    Signed By: Gail Hernandez MD     November 15, 2017 Other...

## 2024-12-17 NOTE — DISCHARGE NOTE PROVIDER - HOSPITAL COURSE
79-year-old male with past med history of HTN, HLD, dialysis with left arm fistula, Monday Wednesday Friday dialysis who presents emergency department for chest pain.  This started just as patient was going to sleep associate with nausea vomiting pain rating down his left upper extremity as well as to his jaw and neck.  Patient has never had pain like this before.  Patient is supposed to be taking aspirin, however stopped because he did not want to take it anymore.  Denies history of stents.  Discussed with interventional cardiology recommending heparin, aspirin, Brilinta and repeat EKG. 12/15/2024 79-year-old male with past med history of HTN, HLD, dialysis with left arm fistula, Monday Wednesday Friday dialysis who presents emergency department for chest pain.  This started just as patient was going to sleep associate with nausea vomiting pain rating down his left upper extremity as well as to his jaw and neck.  Patient has never had pain like this before.  Patient is supposed to be taking aspirin, however stopped because he did not want to take it anymore.  Denies history of stents.  Discussed with interventional cardiology recommending heparin, aspirin, Brilinta and repeat EKG  .... w 12/15/2024 w 12.3   .... No obvious infn noted   .... Trop 12/14 - 12/15 Tr [id number]   .... 12/15/2024 plavx 75   .... 12/14 11p iv ufh   .... 12/15/2024 ATORVASTAT 80   .... 12/15/2024 IMDUR 30   .... 12/15/2024 METOPROLOL 25.3   .... 12/15/2024 MS 1 Q 2 h p  .... pbnp   .... Hb 12/15/2024 Hb 10.7   .... Plt 12/15/2024 Plt 240   .... 12/15/2024 On iv ufh   .... monitor   . ESRD  .... Na 12/15/2024 na 135  .... K 12/15/2024 K 4.6   .... CO2 12/15/2024 co2 28   .... AG 12/15/2024 ag 8   .... Alb 12/15/2024 alb 3.4   .... Cr 12/15/2024 Cr 5.8   .... Ca acetate 1334 x 3   .... HD as per renal   . BPH  .... 12/15/2024 FINASTAERIDE 5  ENDO.  .... 12/15/2024 LEVOXYL 175  NEURO.  .... 12/15/2024 Paxil 40   .... 12/15/2024 alprazolam .25 x 3 p   . 12/15/2024 chest discomfort  . 12/15/2024 Nausea vomiting   PROBLEMS .  . NON TAYLA MI   . ESRD   PMH.  . HTN,   . HLD,   . dialysis with left arm  ·  Problem: Non-ST elevation MI (NSTEMI).   ·  Plan: - Plan for staged PCI with atherectomy of residual RCA disease at [facility] on Thursday, 12/19/2024.  Will need transfer arranged to hospitalist service in anticipation of staged PCI.    - Post cath/PCI routine VS, access site, neuro-vascular monitoring and RUE post access precautions ordered  - Post cath access site precautions reviewed with pt who verbalized good understanding  - No post cath hydration d/t ESRD on HD  - Bedrest. May get OOB 30 minutes after radial band removed if wrist and hemodynamics remain stable   - F/U labs and EKG in am  - Continue dual anti platelet therapy with aspirin AND clopidogrel.  Discontinue heparin gtt  - Pt education provided/reinforced re: importance of strict adherence to uninterrupted DAPT for minimum of 9-12 months (cardiologist will determine duration)  - Patient educated on benefits of Cardiac Rehab Program; referral provided to patient. Referral faxed and copy placed in medical record. Patient given list of locations with phone numbers of local rehab facilities and advised to contact their insurance company for participating providers. Patient educated on need to bring discharge documents including cardiovascular history, medications, and testing/treatments to first appointment.  - Continue statin, beta blocker  - May resume prior diet  - Lifestyle modifications discussed to reduce cardiovascular risk factors including weight reduction, smoking cessation (referral provided if applicable), medication compliance, and routine follow up with Cardiologist to track your BMI, cholesterol, and glucose levels.   - Follow-up on 12/23/2024 @ 1 pm with Dr Vazquez for post PCI check  - Follow-up in 2 weeks with outpatient/referring cardiologist  - Remainder of plan per primary team/non-interventional cardiology.  ·  Problem: ESRD on dialysis.   ·  Plan: HD as per schedule , nephrology is following.  ·  Problem: Hyperkalemia.   ·  Plan: resolved , serial bmp.  ·  Problem: Anxiety and depression.   ·  Plan: continue home medications.  ·  Problem: Chronic diastolic congestive heart failure.   ·  Plan: Admit to monitored unit for cardiac monitoring, obtain echo to evaluate LVEF, prn  diuresis as per card consult , monitor ins/outs, monitor renal profile and electrolytes closely ,send 3 sets of enzymes, O2 supply, serial chest xrays, monitor weights and oral intake of fluids, nutritionist consult.  ·  Problem: HTN (hypertension).   ·  Plan: continue home medications.

## 2024-12-17 NOTE — SWALLOW BEDSIDE ASSESSMENT ADULT - COMMENTS
Swallow evaluation consult order received. SLP attempted to see patient this PM, however patient is off unit at this time for VIC per RN. RN unaware when patient will return to room. This service to re-attempt as schedule permits. Please reconsult this service as needed.

## 2024-12-17 NOTE — DISCHARGE NOTE PROVIDER - NSDCFUADDINST_GEN_ALL_CORE_FT
Wound Care:   the day AFTER your procedure...     Remove the bandage from the site and gently clean with soap and water then pat dry; leave open to air.     You may take a brief shower     Do NOT apply lotions, creams, powders, ointments, or perfumes to your incision site unless prescribed by your physician     Do NOT soak your procedure site for 1 week (no baths, no pools, no tubs, etc...)     Check  your groin and /or wrist daily. A small amount of bruising, and soreness are normal    ACTIVITY: for 24 hours      - DO NOT DRIVE     - DO NOT make any important decisions or sign legal documents      - DO NOT operate heavy machinery      - you may resume sexual activity in 48 hours, unless otherwise instructed by your cardiologist          If your procedure was done through the WRIST: for the NEXT 3 DAYS:          - avoid pushing, pulling, with that affected wrist (such as pushing up from a seated position)          - avoid repeated movement of that hand and wrist (such as typing or hammering)          - DO NOT LIFT anything more than 5 pounds         If your procedure was done through the GROIN: for the NEXT 5 DAYS          - Limit climbing stairs, DO NOT soak in bathtub or pool          - no strenous activities, pushing, pulling, straining          - Do not lift anything more than 10 pounds     MEDICATION:      Please take your medications as explained to you (found on your discharge paperwork)      If you received a stent, you will be taking medication to KEEP YOUR STENT OPEN.            You MUST start taking this medication immediately.           Take this medication as prescribed and uninterrupted.            DO NOT STOP taking them for any reason without consulting with your cardiologist first.      **if you have diabetes and take metformin please do not take this medication for 2 days after the procedure. Restart and take as usual starting day 3.    Follow the heart healthy diet recommended by your doctor.   Drink plenty of water for the next 24 hours unless otherwise instructed.  Do not drink any alcoholic beverages for 24 hours (beer, wine, liquor, etc).    If you smoke: STOP SMOKING. Call the [facility] [employer] [facility] at [phone] for assistance.    CALL your cardiologist/primary care doctor to make a follow-up appointment in 2 WEEKS     **CALL YOUR DOCTOR if you experience     fever, chills, body aches, or severe pain, swelling, redness, heat or yellow discharge at incision site     bleeding or excruciating pain at the procedural site, swelling (golf ball size) at your procedural site     CHEST PAIN     numbness, tingling, temperature change (of your procedural site)     Pain  -you may have pain after your surgery or procedure at the puncture site or in the artery/vein that has been treated.  -take pain medication as directed by your doctor.  call your doctor if your pain is not getting better within 5 days or if it gets worse  -prescription pain medication should be taken with food, and can cause constipation, an over-the-counter softener may be helpful    Nausea  -anesthesia/sedation can upset your stomach  -eat bland foods (Jell-o, crackers, toast) and drink ginger ale if you are nauseated  -drink plenty of fluids such as water or ginger ale (unless instructed otherwise by your doctor)  -if you have nausea or vomiting the day after your procedure, call your doctor    Bleeding  -you may have a small amount of oozing from your surgical or procedural site  -bleeding as the site can be dangerous and should prompt immediate medical attention    Infection  -if you have any of the following signs of infection, call your doctor:       redness, swelling, fever over 101 degrees, thick yellow/white drainage    If you are unable to reach your doctor, you may contact:   Dr Francisco Bernal @ [phone]    **Call 911 immediately if:     - your hand or leg becomes blue, feels cold to touch, or if you have numbness or tingling     - bleeding or swelling from your wrist or groin site cannot be controlled or if area becomes very red or hot to touch     - you have pain, pressure, tightness or burning in your chest, arms, jaw or stomach; shortness of breath; nausea or excessive sweating; lightheadedness; dizziness or a fainting spell; or if you have sudden back or stomach pain     -you have rapid heartbeat or palpitations     - you have bright red blood in large amounts, severe pain at access site (wrist or groin) or significant new swelling at the puncture site    If/because you had anesthesia, for the next 24 hours you should NOT:  -drive a car, operate power tool or machinery  -drink alcohol, beer, or wine  -make important personal or business decisions  If you had any type of sedation, you may experience lightheadedness, dizziness, or sleepiness following your procedure. A responsible adult should stay with you for at least 24 hours following your procedure.

## 2024-12-17 NOTE — PROGRESS NOTE ADULT - SUBJECTIVE AND OBJECTIVE BOX
[facility] [place]       HPI:     CHIEF COMPLAINT.   . 12/15/2024 Patient BIBA from [facility], complaining of nausea and vomiting and HTN earlier today, has left arm dialysis (MWF), states has not missed any appointments, 18g right hand, given 4mg of zofran prior    OVERALL PRESENTATION.  . 12/15/2024 79-year-old male with past med history of HTN, HLD, dialysis with left arm fistula, Monday Wednesday Friday dialysis who presents emergency department for chest pain.  This started just as patient was going to sleep associate with nausea vomiting pain rating down his left upper extremity as well as to his jaw and neck.  Patient has never had pain like this before.  Patient is supposed to be taking aspirin, however stopped because he did not want to take it anymore.  Denies history of stents.  Discussed with interventional cardiology recommending heparin, aspirin, Brilinta and repeat EKG   (15 Dec 2024 08:18)        SUBJECTIVE:      ALLERGIES:  Allergies    No Known Allergies    Intolerances          MEDICATIONS  (STANDING):  atorvastatin 80 milliGRAM(s) Oral at bedtime  chlorhexidine 2% Cloths 1 Application(s) Topical daily  clopidogrel Tablet 75 milliGRAM(s) Oral daily  finasteride 5 milliGRAM(s) Oral daily  heparin  Infusion. 800 Unit(s)/Hr (8 mL/Hr) IV Continuous <Continuous>  isosorbide   mononitrate ER Tablet (IMDUR) 30 milliGRAM(s) Oral daily  levothyroxine 175 MICROGram(s) Oral daily  metoprolol tartrate 25 milliGRAM(s) Oral three times a day  multivitamin 1 Tablet(s) Oral daily  pantoprazole    Tablet 40 milliGRAM(s) Oral daily  PARoxetine 40 milliGRAM(s) Oral daily  polyethylene glycol 3350 17 Gram(s) Oral at bedtime  sevelamer carbonate 800 milliGRAM(s) Oral three times a day with meals    MEDICATIONS  (PRN):  acetaminophen     Tablet .. 650 milliGRAM(s) Oral every 6 hours PRN Temp greater or equal to 38C (100.4F), Mild Pain (1 - 3)  acetaminophen   IVPB .. 1000 milliGRAM(s) IV Intermittent once PRN Temp greater or equal to 38C (100.4F), Moderate Pain (4 - 6)  ALPRAZolam 0.25 milliGRAM(s) Oral every 8 hours PRN anxiety  aluminum hydroxide/magnesium hydroxide/simethicone Suspension 30 milliLiter(s) Oral every 4 hours PRN Dyspepsia  bisacodyl Suppository 10 milliGRAM(s) Rectal daily PRN Constipation  heparin   Injectable 5600 Unit(s) IV Push every 6 hours PRN For aPTT less than 40  melatonin 3 milliGRAM(s) Oral at bedtime PRN Insomnia  morphine  - Injectable 1 milliGRAM(s) IV Push every 2 hours PRN Moderate Pain (4 - 6)  nitroglycerin     SubLingual 0.4 milliGRAM(s) SubLingual every 5 minutes PRN Chest Pain  ondansetron Injectable 4 milliGRAM(s) IV Push every 8 hours PRN Nausea and/or Vomiting      REVIEW OF SYSTEMS:  CONSTITUTIONAL: No fever,  RESPIRATORY: No cough, wheezing, shortness of breath  CARDIOVASCULAR: No chest pain, dyspnea, palpitations, dizziness, syncope, paroxysmal nocturnal dyspnea, orthopnea, or arm or leg swelling  GASTROINTESTINAL: No abdominal  or epigastric pain, nausea, vomiting,  diarrhea  NEUROLOGICAL: No headaches,  loss of strength, numbness, or tremors    Vital Signs Last 24 Hrs  T(C): 36.7 (17 Dec 2024 05:30), Max: 36.8 (16 Dec 2024 09:20)  T(F): 98 (17 Dec 2024 05:30), Max: 98.2 (16 Dec 2024 09:20)  HR: 65 (17 Dec 2024 05:30) (63 - 75)  BP: 109/55 (17 Dec 2024 05:30) (98/55 - 139/68)  BP(mean): --  RR: 18 (17 Dec 2024 05:30) (17 - 19)  SpO2: 95% (17 Dec 2024 05:30) (86% - 98%)    Parameters below as of 17 Dec 2024 05:30  Patient On (Oxygen Delivery Method): nasal cannula  O2 Flow (L/min): 2      PHYSICAL EXAM:  HEAD:  Atraumatic, Normocephalic  NECK: Supple and normal thyroid.  No JVD or carotid bruit.   HEART: S1, S2 regular , 1/6 soft ejection systolic murmur in mitral area , no thrill and no gallops .  PULMONARY: Bilateral vesicular breathing , few scattered ronchi and few scattered rales are present .  ABDOMEN: Soft nontender and positive bowl sounds   EXTREMITIES:  No clubbing, cyanosis, or pedal  edema  NEUROLOGICAL: AAOX3 , no focal deficit .    LABS:                        9.4    9.70  )-----------( 307      ( 17 Dec 2024 05:30 )             28.9     12-17    136  |  97  |  52[H]  ----------------------------<  94  4.3   |  32[H]  |  5.60[H]    Ca    9.0      17 Dec 2024 05:30  Mg     2.5     12-16    TPro  7.1  /  Alb  3.1[L]  /  TBili  0.5  /  DBili  x   /  AST  44[H]  /  ALT  23  /  AlkPhos  76  12-17        PT/INR - ( 17 Dec 2024 05:30 )   PT: 15.9 sec;   INR: 1.36 ratio         PTT - ( 17 Dec 2024 05:30 )  PTT:61.7 sec  Urinalysis Basic - ( 17 Dec 2024 05:30 )    Color: x / Appearance: x / SG: x / pH: x  Gluc: 94 mg/dL / Ketone: x  / Bili: x / Urobili: x   Blood: x / Protein: x / Nitrite: x   Leuk Esterase: x / RBC: x / WBC x   Sq Epi: x / Non Sq Epi: x / Bacteria: x      BNP      EKG:  ECHO:  IMAGING:    Assessment/Plan    Will continue to follow during hospital course and give further recommendations as needed . Thanks for your referral . if any questions please contact me at office ([phone] [phone])  MARIA ALEJANDRA DUNN MD [place] [id number]  [address]  OFFICE : [phone]  CELL : [phone]  CARDIOLOGY CONSULT :      HPI:     CHIEF COMPLAINT.   . 12/15/2024 Patient BIBA from [facility], complaining of nausea and vomiting and HTN earlier today, has left arm dialysis (MWF), states has not missed any appointments, 18g right hand, given 4mg of zofran prior    OVERALL PRESENTATION.  . 12/15/2024 79-year-old male with past med history of HTN, HLD, dialysis with left arm fistula, Monday Wednesday Friday dialysis who presents emergency department for chest pain.  This started just as patient was going to sleep associate with nausea vomiting pain rating down his left upper extremity as well as to his jaw and neck.  Patient has never had pain like this before.  Patient is supposed to be taking aspirin, however stopped because he did not want to take it anymore.  Denies history of stents.  Discussed with interventional cardiology recommending heparin, aspirin, Brilinta and repeat EKG   (15 Dec 2024 08:18)        SUBJECTIVE: Patient feeling better .      ALLERGIES:  Allergies    No Known Allergies    Intolerances          MEDICATIONS  (STANDING):  atorvastatin 80 milliGRAM(s) Oral at bedtime  chlorhexidine 2% Cloths 1 Application(s) Topical daily  clopidogrel Tablet 75 milliGRAM(s) Oral daily  finasteride 5 milliGRAM(s) Oral daily  heparin  Infusion. 800 Unit(s)/Hr (8 mL/Hr) IV Continuous <Continuous>  isosorbide   mononitrate ER Tablet (IMDUR) 30 milliGRAM(s) Oral daily  levothyroxine 175 MICROGram(s) Oral daily  metoprolol tartrate 25 milliGRAM(s) Oral three times a day  multivitamin 1 Tablet(s) Oral daily  pantoprazole    Tablet 40 milliGRAM(s) Oral daily  PARoxetine 40 milliGRAM(s) Oral daily  polyethylene glycol 3350 17 Gram(s) Oral at bedtime  sevelamer carbonate 800 milliGRAM(s) Oral three times a day with meals    MEDICATIONS  (PRN):  acetaminophen     Tablet .. 650 milliGRAM(s) Oral every 6 hours PRN Temp greater or equal to 38C (100.4F), Mild Pain (1 - 3)  acetaminophen   IVPB .. 1000 milliGRAM(s) IV Intermittent once PRN Temp greater or equal to 38C (100.4F), Moderate Pain (4 - 6)  ALPRAZolam 0.25 milliGRAM(s) Oral every 8 hours PRN anxiety  aluminum hydroxide/magnesium hydroxide/simethicone Suspension 30 milliLiter(s) Oral every 4 hours PRN Dyspepsia  bisacodyl Suppository 10 milliGRAM(s) Rectal daily PRN Constipation  heparin   Injectable 5600 Unit(s) IV Push every 6 hours PRN For aPTT less than 40  melatonin 3 milliGRAM(s) Oral at bedtime PRN Insomnia  morphine  - Injectable 1 milliGRAM(s) IV Push every 2 hours PRN Moderate Pain (4 - 6)  nitroglycerin     SubLingual 0.4 milliGRAM(s) SubLingual every 5 minutes PRN Chest Pain  ondansetron Injectable 4 milliGRAM(s) IV Push every 8 hours PRN Nausea and/or Vomiting      REVIEW OF SYSTEMS:  CONSTITUTIONAL: No fever,  RESPIRATORY: No cough, wheezing, shortness of breath  CARDIOVASCULAR: No chest pain, dyspnea, palpitations, dizziness, syncope, paroxysmal nocturnal dyspnea, orthopnea, or arm or leg swelling  GASTROINTESTINAL: No abdominal  or epigastric pain, nausea, vomiting,  diarrhea  NEUROLOGICAL: No headaches,  loss of strength, numbness, or tremors    Vital Signs Last 24 Hrs  T(C): 36.7 (17 Dec 2024 05:30), Max: 36.8 (16 Dec 2024 09:20)  T(F): 98 (17 Dec 2024 05:30), Max: 98.2 (16 Dec 2024 09:20)  HR: 65 (17 Dec 2024 05:30) (63 - 75)  BP: 109/55 (17 Dec 2024 05:30) (98/55 - 139/68)  BP(mean): --  RR: 18 (17 Dec 2024 05:30) (17 - 19)  SpO2: 95% (17 Dec 2024 05:30) (86% - 98%)    Parameters below as of 17 Dec 2024 05:30  Patient On (Oxygen Delivery Method): nasal cannula  O2 Flow (L/min): 2      PHYSICAL EXAM:  HEAD:  Atraumatic, Normocephalic  NECK: Supple and normal thyroid.  No JVD or carotid bruit.   HEART: S1, S2 regular , 1/6 soft ejection systolic murmur in mitral area , no thrill and no gallops .  PULMONARY: Bilateral vesicular breathing , few scattered ronchi and few scattered rales are present .  ABDOMEN: Soft nontender and positive bowl sounds   EXTREMITIES:  No clubbing, cyanosis, or pedal  edema  NEUROLOGICAL: AAOX3 , no focal deficit .    LABS:                        9.4    9.70  )-----------( 307      ( 17 Dec 2024 05:30 )             28.9     12-17    136  |  97  |  52[H]  ----------------------------<  94  4.3   |  32[H]  |  5.60[H]    Ca    9.0      17 Dec 2024 05:30  Mg     2.5     12-16    TPro  7.1  /  Alb  3.1[L]  /  TBili  0.5  /  DBili  x   /  AST  44[H]  /  ALT  23  /  AlkPhos  76  12-17        PT/INR - ( 17 Dec 2024 05:30 )   PT: 15.9 sec;   INR: 1.36 ratio         PTT - ( 17 Dec 2024 05:30 )  PTT:61.7 sec  Urinalysis Basic - ( 17 Dec 2024 05:30 )    Color: x / Appearance: x / SG: x / pH: x  Gluc: 94 mg/dL / Ketone: x  / Bili: x / Urobili: x   Blood: x / Protein: x / Nitrite: x   Leuk Esterase: x / RBC: x / WBC x   Sq Epi: x / Non Sq Epi: x / Bacteria: x      BNP      EKG:  ECHO:  IMAGING:    Assessment/Plan    Will continue to follow during hospital course and give further recommendations as needed . Thanks for your referral . if any questions please contact me at office ([phone])cell [phone])  MARIA ALEJANDRA DUNN MD [place] [id number]  [address]  OFFICE : [phone]  CELL : [phone]  CARDIOLOGY CONSULT :      HPI:     CHIEF COMPLAINT.   . 12/15/2024 Patient BIBA from [facility], complaining of nausea and vomiting and HTN earlier today, has left arm dialysis (MWF), states has not missed any appointments, 18g right hand, given 4mg of zofran prior    OVERALL PRESENTATION.  . 12/15/2024 79-year-old male with past med history of HTN, HLD, dialysis with left arm fistula, Monday Wednesday Friday dialysis who presents emergency department for chest pain.  This started just as patient was going to sleep associate with nausea vomiting pain rating down his left upper extremity as well as to his jaw and neck.  Patient has never had pain like this before.  Patient is supposed to be taking aspirin, however stopped because he did not want to take it anymore.  Denies history of stents.  Discussed with interventional cardiology recommending heparin, aspirin, Brilinta and repeat EKG   (15 Dec 2024 08:18)        SUBJECTIVE: Patient feeling better .      ALLERGIES:  Allergies    No Known Allergies    Intolerances          MEDICATIONS  (STANDING):  atorvastatin 80 milliGRAM(s) Oral at bedtime  chlorhexidine 2% Cloths 1 Application(s) Topical daily  clopidogrel Tablet 75 milliGRAM(s) Oral daily  finasteride 5 milliGRAM(s) Oral daily  heparin  Infusion. 800 Unit(s)/Hr (8 mL/Hr) IV Continuous <Continuous>  isosorbide   mononitrate ER Tablet (IMDUR) 30 milliGRAM(s) Oral daily  levothyroxine 175 MICROGram(s) Oral daily  metoprolol tartrate 25 milliGRAM(s) Oral three times a day  multivitamin 1 Tablet(s) Oral daily  pantoprazole    Tablet 40 milliGRAM(s) Oral daily  PARoxetine 40 milliGRAM(s) Oral daily  polyethylene glycol 3350 17 Gram(s) Oral at bedtime  sevelamer carbonate 800 milliGRAM(s) Oral three times a day with meals    MEDICATIONS  (PRN):  acetaminophen     Tablet .. 650 milliGRAM(s) Oral every 6 hours PRN Temp greater or equal to 38C (100.4F), Mild Pain (1 - 3)  acetaminophen   IVPB .. 1000 milliGRAM(s) IV Intermittent once PRN Temp greater or equal to 38C (100.4F), Moderate Pain (4 - 6)  ALPRAZolam 0.25 milliGRAM(s) Oral every 8 hours PRN anxiety  aluminum hydroxide/magnesium hydroxide/simethicone Suspension 30 milliLiter(s) Oral every 4 hours PRN Dyspepsia  bisacodyl Suppository 10 milliGRAM(s) Rectal daily PRN Constipation  heparin   Injectable 5600 Unit(s) IV Push every 6 hours PRN For aPTT less than 40  melatonin 3 milliGRAM(s) Oral at bedtime PRN Insomnia  morphine  - Injectable 1 milliGRAM(s) IV Push every 2 hours PRN Moderate Pain (4 - 6)  nitroglycerin     SubLingual 0.4 milliGRAM(s) SubLingual every 5 minutes PRN Chest Pain  ondansetron Injectable 4 milliGRAM(s) IV Push every 8 hours PRN Nausea and/or Vomiting      REVIEW OF SYSTEMS:  CONSTITUTIONAL: No fever,  RESPIRATORY: No cough, wheezing and improvement in  shortness of breath  CARDIOVASCULAR: No more  chest pain, No dyspnea, palpitations, dizziness, syncope, paroxysmal nocturnal dyspnea,  or arm or leg swelling and improvement bin SOB .  GASTROINTESTINAL: No abdominal  or epigastric pain, nausea, vomiting,  diarrhea  NEUROLOGICAL: No headaches,  numbness, or tremors  Skin : No itching .  Hematology : No active bleeding .  Endocrinology : No heat and cold intolerance.  Psychiatry : Patient is calm .  Musculoskeletal : Patient has mild arthritis .  Genitourinary : No dysuria .    Vital Signs Last 24 Hrs  T(C): 36.7 (17 Dec 2024 05:30), Max: 36.8 (16 Dec 2024 09:20)  T(F): 98 (17 Dec 2024 05:30), Max: 98.2 (16 Dec 2024 09:20)  HR: 65 (17 Dec 2024 05:30) (63 - 75)  BP: 109/55 (17 Dec 2024 05:30) (98/55 - 139/68)  BP(mean): --  RR: 18 (17 Dec 2024 05:30) (17 - 19)  SpO2: 95% (17 Dec 2024 05:30) (86% - 98%)    Parameters below as of 17 Dec 2024 05:30  Patient On (Oxygen Delivery Method): nasal cannula  O2 Flow (L/min): 2      PHYSICAL EXAM:  HEAD:  Atraumatic, Normocephalic  NECK: Supple and normal thyroid.  No JVD or carotid bruit.   HEART: S1, S2 regular , 1/6 soft ejection systolic murmur in mitral area , no thrill and no gallops .  PULMONARY: Bilateral vesicular breathing , few scattered rhonchi and few scattered rales are present .  ABDOMEN: Soft nontender and positive bowl sounds   EXTREMITIES:  No clubbing, cyanosis, or pedal  edema  NEUROLOGICAL: AAOX3 , no focal deficit .  Skin : No rashes . .  Musculoskeletal : No joint swellings .    LABS:                        9.4    9.70  )-----------( 307      ( 17 Dec 2024 05:30 )             28.9     12-17    136  |  97  |  52[H]  ----------------------------<  94  4.3   |  32[H]  |  5.60[H]    Ca    9.0      17 Dec 2024 05:30  Mg     2.5     12-16    TPro  7.1  /  Alb  3.1[L]  /  TBili  0.5  /  DBili  x   /  AST  44[H]  /  ALT  23  /  AlkPhos  76  12-17        PT/INR - ( 17 Dec 2024 05:30 )   PT: 15.9 sec;   INR: 1.36 ratio         PTT - ( 17 Dec 2024 05:30 )  PTT:61.7 sec  Urinalysis Basic - ( 17 Dec 2024 05:30 )    Color: x / Appearance: x / SG: x / pH: x  Gluc: 94 mg/dL / Ketone: x  / Bili: x / Urobili: x   Blood: x / Protein: x / Nitrite: x   Leuk Esterase: x / RBC: x / WBC x   Sq Epi: x / Non Sq Epi: x / Bacteria: x      Assessment/Plan  79 years old male has chest discomfort and nausea vomiting. Patient chest pain is on the left side of chest and better with nitrates and also has EKG abnormalities . Patient Troponin I elevated . Patient feeling much better at the time of my examination and chest pain significantly better.  Patient mild SOB but better . Patient ambulates with walker and can do more than 100 feet slowly . Patient understands all questions very well and answers appropriately . Patient history obtained from patient himself , RN , PA , PMD and other consultants notes .   Patient Second Troponin I significantly elevated and patient has ACS , NON-ST FRANCK . Continue aspirin , Plavix , nitrates , metoprolol and I/V heparin drip . Will repeat Troponin I levels to see further trending of Troponin I levels . Monitor hemoglobin and electrolytes . Will get echocardiogram done . Patient is chest pain free at this time and doing well . Will arrange cardiac cath  if patient and family agrees . Patient cardiac wise stable and cleared for cardiac cath . Patient hemodynamically stable at this time . Prognosis guarded . Continue rest of medications as such .  Patient has :  1) Chest pain and NON-ST FRANCK and patient for cardiac cath   2) ESRD   3) Mild chronic diastolic heart failure . LV EF 55% .  4) Hypertension and stable .  5) Hypothyroidism .  6) Chronic anemia   Plan : 1) Patient cardiac wise stable and cleared for cardiac cath 2) Monitor hemoglobin and electrolytes 3) Rest of medications as such 4) Prognosis guarded .   Will continue to follow during hospital course and give further recommendations as needed . Thanks for your referral . if any questions please contact me at office ([phone] cell [phone])

## 2024-12-17 NOTE — CHART NOTE - NSCHARTNOTEFT_GEN_A_CORE
Interventional Cardiology ACP Pre-Cath Note    Chart Reviewed  Patient with ESRD on HD; last session yesterday, 12/16/24.  Hypokalemia resolved, K+ 4.0 on am labs  Proceed with cardiac catheterization today for NSTEMI  No pre-Cath hydration given ESRD on HD  NPO except medications since MN.  Continue NPO in anticipation of cardiac catheterization  Hold heparin on call to Cath lab  Continue DAPT with ASA and clopidogrel  Further recommendations to follow

## 2024-12-17 NOTE — PROGRESS NOTE ADULT - ASSESSMENT
ESRD on HD MWF at [place] [facility] (Dr Vega)  Hyperkalemia  Hyperphosphatemia  Chest pain    12/16/24: HD today. Low potassium bath. Dietary compliance advised. Trend BP and titrate BP meds as needed. Phos binders.   Add lokelma on non dialysis days on discharge. Cardiology follow up.   12/17/24: Cardiac cath today. Next HD tomorrow. Low potassium diet. Cardiology follow up.

## 2024-12-17 NOTE — CASE MANAGEMENT PROGRESS NOTE - NSCMPROGRESSNOTE_GEN_ALL_CORE
Discussed patient in interdisciplinary rounds.  Spoke with Petty from wellness at [facility] [phone].  She states patient as being independent with use of a walker, drives self.  Will need one page [place] attestation when D/C ready with new/changed meds and fax to [phone].  Typical [facility] is TLC.  Patient is anticipated for cardiac cath today.  Will monitor for transition needs and remain available.

## 2024-12-17 NOTE — PROGRESS NOTE ADULT - SUBJECTIVE AND OBJECTIVE BOX
PROGRESS NOTE  Patient is a 79y old  Male who presents with a chief complaint of ACS (17 Dec 2024 08:19)  Chart and available morning labs /imaging are reviewed electronically , urgent issues addressed . More information  is being added upon completion of rounds , when more information is collected and management discussed with consultants , medical staff and social service/case management on the floor   OVERNIGHT  No new issues reported by medical staff . All above noted Patient is resting in a bed comfortably .Confused ,poor mentation .No distress noted   HPI:   CHIEF COMPLAINT.   . 12/15/2024 Patient BIBA from [facility], complaining of nausea and vomiting and HTN earlier today, has left arm dialysis (MWF), states has not missed any appointments, 18g right hand, given 4mg of zofran prior  OVERALL PRESENTATION.  . 12/15/2024 79-year-old male with past med history of HTN, HLD, dialysis with left arm fistula, Monday Wednesday Friday dialysis who presents emergency department for chest pain.  This started just as patient was going to sleep associate with nausea vomiting pain rating down his left upper extremity as well as to his jaw and neck.  Patient has never had pain like this before.  Patient is supposed to be taking aspirin, however stopped because he did not want to take it anymore.  Denies history of stents.  Discussed with interventional cardiology recommending heparin, aspirin, Brilinta and repeat EKG   (15 Dec 2024 08:18)  PAST MEDICAL & SURGICAL HISTORY:  ESRD on dialysis  Thyroid cancer  Lung cancer  HLD (hyperlipidemia)  Anxiety and depression  Acquired hypothyroidism  ESRD on dialysis  MEDICATIONS  (STANDING):  atorvastatin 80 milliGRAM(s) Oral at bedtime  chlorhexidine 2% Cloths 1 Application(s) Topical daily  dextrose 5% + sodium chloride 0.45%. 1000 milliLiter(s) (20 mL/Hr) IV Continuous <Continuous>  finasteride 5 milliGRAM(s) Oral daily  isosorbide   mononitrate ER Tablet (IMDUR) 30 milliGRAM(s) Oral daily  levothyroxine 175 MICROGram(s) Oral daily  metoprolol tartrate 25 milliGRAM(s) Oral three times a day  multivitamin 1 Tablet(s) Oral daily  pantoprazole    Tablet 40 milliGRAM(s) Oral daily  PARoxetine 40 milliGRAM(s) Oral daily  polyethylene glycol 3350 17 Gram(s) Oral at bedtime  sevelamer carbonate 800 milliGRAM(s) Oral three times a day with meals    MEDICATIONS  (PRN):  acetaminophen     Tablet .. 650 milliGRAM(s) Oral every 6 hours PRN Temp greater or equal to 38C (100.4F), Mild Pain (1 - 3)  acetaminophen   IVPB .. 1000 milliGRAM(s) IV Intermittent once PRN Temp greater or equal to 38C (100.4F), Moderate Pain (4 - 6)  ALPRAZolam 0.25 milliGRAM(s) Oral every 8 hours PRN anxiety  aluminum hydroxide/magnesium hydroxide/simethicone Suspension 30 milliLiter(s) Oral every 4 hours PRN Dyspepsia  bisacodyl Suppository 10 milliGRAM(s) Rectal daily PRN Constipation  melatonin 3 milliGRAM(s) Oral at bedtime PRN Insomnia  morphine  - Injectable 1 milliGRAM(s) IV Push every 2 hours PRN Moderate Pain (4 - 6)  nitroglycerin     SubLingual 0.4 milliGRAM(s) SubLingual every 5 minutes PRN Chest Pain  ondansetron Injectable 4 milliGRAM(s) IV Push every 8 hours PRN Nausea and/or Vomiting      OBJECTIVE    T(C): 36.5 (12-17-24 @ 12:45), Max: 36.8 (12-17-24 @ 04:26)  HR: 71 (12-17-24 @ 14:15) (61 - 73)  BP: 161/70 (12-17-24 @ 14:15) (98/55 - 204/77)  RR: 16 (12-17-24 @ 14:15) (12 - 20)  SpO2: 94% (12-17-24 @ 14:15) (86% - 99%)  Wt(kg): --  I&O's Summary    16 Dec 2024 07:01  -  17 Dec 2024 07:00  --------------------------------------------------------  IN: 0 mL / OUT: 2200 mL / NET: -2200 mL          REVIEW OF SYSTEMS:  CONSTITUTIONAL: No fever, weight loss, or fatigue  EYES: No eye pain, visual disturbances, or discharge  ENMT:   No sinus or throat pain  NECK: No pain or stiffness  RESPIRATORY: No cough, wheezing, chills or hemoptysis; No shortness of breath  CARDIOVASCULAR: No chest pain, palpitations, dizziness, or leg swelling  GASTROINTESTINAL: No abdominal pain. No nausea, vomiting; No diarrhea or constipation. No melena or hematochezia.  GENITOURINARY: No dysuria, frequency, hematuria, or incontinence  NEUROLOGICAL: No headaches, memory loss, loss of strength, numbness, or tremors  SKIN: No itching, burning, rashes, or lesions   MUSCULOSKELETAL: No joint pain or swelling; No muscle, back, or extremity pain    PHYSICAL EXAM:  Appearance: NAD. VS past 24 hrs -as above   HEENT:   Moist oral mucosa. Conjunctiva clear b/l.   Neck : supple  Respiratory: Lungs CTAB.  Gastrointestinal:  Soft, nontender. No rebound. No rigidity. BS present	  Cardiovascular: RRR ,S1S2 present  Neurologic: Non-focal. Moving all extremities.  Extremities: No edema. No erythema. No calf tenderness.  Skin: No rashes, No ecchymoses, No cyanosis.	  wounds ,skin lesions-See skin assesment flow sheet   LABS:                        9.4    9.70  )-----------( 307      ( 17 Dec 2024 05:30 )             28.9     12-17    136  |  97  |  52[H]  ----------------------------<  94  4.3   |  32[H]  |  5.60[H]    Ca    9.0      17 Dec 2024 05:30  Mg     2.5     12-16    TPro  7.1  /  Alb  3.1[L]  /  TBili  0.5  /  DBili  x   /  AST  44[H]  /  ALT  23  /  AlkPhos  76  12-17    CAPILLARY BLOOD GLUCOSE        PT/INR - ( 17 Dec 2024 05:30 )   PT: 15.9 sec;   INR: 1.36 ratio         PTT - ( 17 Dec 2024 05:30 )  PTT:61.7 sec  Urinalysis Basic - ( 17 Dec 2024 05:30 )    Color: x / Appearance: x / SG: x / pH: x  Gluc: 94 mg/dL / Ketone: x  / Bili: x / Urobili: x   Blood: x / Protein: x / Nitrite: x   Leuk Esterase: x / RBC: x / WBC x   Sq Epi: x / Non Sq Epi: x / Bacteria: x        RADIOLOGY & ADDITIONAL TESTS:   reviewed elctronically  ASSESSMENT/PLAN: 	    25 minutes aggregate time was spent on this visit, 50% visit time spent in care co-ordination with other attendings and counselling patient .I have discussed care plan with patient / HCP/family member ,who expressed understanding of problems treatment and their effect and side effects, alternatives in details. I have asked if they have any questions and concerns and appropriately addressed them to best of my ability.

## 2024-12-17 NOTE — DISCHARGE NOTE PROVIDER - NSDCMRMEDTOKEN_GEN_ALL_CORE_FT
aspirin 81 mg oral tablet: 1 tab(s) orally once a day  escitalopram 10 mg oral tablet: 1 tab(s) orally once a day  finasteride 5 mg oral tablet: 1 tab(s) orally once a day  levothyroxine 175 mcg (0.175 mg) oral tablet: 1 tab(s) orally once a day  lidocaine-prilocaine 2.5%-2.5% topical cream: 1 Apply topically to affected area once a day As needed w dialysis  lisinopril 2.5 mg oral tablet: 1 tab(s) orally once a day  melatonin 3 mg oral tablet: 1 tab(s) orally once a day (at bedtime) As needed Insomnia  Multiple Vitamins oral tablet: 1 tab(s) orally once a day  rosuvastatin 10 mg oral capsule: 1 cap(s) orally once a day  sevelamer carbonate 800 mg oral tablet: 2 tab(s) orally 3 times a day (with meals)  sodium zirconium cyclosilicate: orally 4 times a week Sun/ Tues/ Thurs/ Sat  zolpidem 5 mg oral tablet: 1 tab(s) orally once a day (at bedtime) As needed Insomnia   acetaminophen 10 mg/mL intravenous solution: 100 milliliter(s) intravenous once As needed Temp greater or equal to 38C (100.4F), Moderate Pain (4 - 6)  acetaminophen 325 mg oral tablet: 2 tab(s) orally every 6 hours As needed Temp greater or equal to 38C (100.4F), Mild Pain (1 - 3)  ALPRAZolam 0.25 mg oral tablet: 1 tab(s) orally every 8 hours As needed anxiety  aspirin 81 mg oral delayed release tablet: 1 tab(s) orally every 24 hours  atorvastatin 80 mg oral tablet: 1 tab(s) orally once a day (at bedtime)  bisacodyl 10 mg rectal suppository: 1 suppository(ies) rectal once a day As needed Constipation  clopidogrel 75 mg oral tablet: 1 tab(s) orally every 24 hours  escitalopram 10 mg oral tablet: 1 tab(s) orally once a day  finasteride 5 mg oral tablet: 1 tab(s) orally once a day  isosorbide mononitrate 30 mg oral tablet, extended release: 1 tab(s) orally once a day  levothyroxine 175 mcg (0.175 mg) oral tablet: 1 tab(s) orally once a day  lidocaine-prilocaine 2.5%-2.5% topical cream: 1 Apply topically to affected area once a day As needed w dialysis  lisinopril 2.5 mg oral tablet: 1 tab(s) orally once a day  melatonin 3 mg oral tablet: 1 tab(s) orally once a day (at bedtime) As needed Insomnia  metoprolol tartrate 25 mg oral tablet: 1 tab(s) orally 3 times a day  Multiple Vitamins oral tablet: 1 tab(s) orally once a day  pantoprazole 40 mg oral delayed release tablet: 1 tab(s) orally once a day  PARoxetine 40 mg oral tablet: 1 tab(s) orally once a day  polyethylene glycol 3350 oral powder for reconstitution: 17 gram(s) orally once a day (at bedtime)  rosuvastatin 10 mg oral capsule: 1 cap(s) orally once a day  sevelamer carbonate 800 mg oral tablet: 2 tab(s) orally 3 times a day (with meals)  sodium zirconium cyclosilicate: orally 4 times a week Sun/ Tues/ Thurs/ Sat  zolpidem 5 mg oral tablet: 1 tab(s) orally once a day (at bedtime) As needed Insomnia   acetaminophen 10 mg/mL intravenous solution: 100 milliliter(s) intravenous once As needed Temp greater or equal to 38C (100.4F), Moderate Pain (4 - 6)  acetaminophen 325 mg oral tablet: 2 tab(s) orally every 6 hours As needed Temp greater or equal to 38C (100.4F), Mild Pain (1 - 3)  ALPRAZolam 0.25 mg oral tablet: 1 tab(s) orally every 8 hours As needed anxiety  aspirin 81 mg oral delayed release tablet: 1 tab(s) orally every 24 hours  atorvastatin 80 mg oral tablet: 1 tab(s) orally once a day (at bedtime)  bisacodyl 10 mg rectal suppository: 1 suppository(ies) rectal once a day As needed Constipation  clopidogrel 75 mg oral tablet: 1 tab(s) orally every 24 hours  finasteride 5 mg oral tablet: 1 tab(s) orally once a day  heparin 5000 units/mL injectable solution: 5,000 unit(s) subcutaneous every 8 hours  isosorbide mononitrate 30 mg oral tablet, extended release: 1 tab(s) orally once a day  levothyroxine 175 mcg (0.175 mg) oral tablet: 1 tab(s) orally once a day  melatonin 3 mg oral tablet: 1 tab(s) orally once a day (at bedtime) As needed Insomnia  metoprolol tartrate 25 mg oral tablet: 1 tab(s) orally 3 times a day  Multiple Vitamins oral tablet: 1 tab(s) orally once a day  pantoprazole 40 mg oral delayed release tablet: 1 tab(s) orally once a day  PARoxetine 40 mg oral tablet: 1 tab(s) orally once a day  polyethylene glycol 3350 oral powder for reconstitution: 17 gram(s) orally once a day (at bedtime)  sevelamer carbonate 800 mg oral tablet: 2 tab(s) orally 3 times a day (with meals)

## 2024-12-17 NOTE — DISCHARGE NOTE PROVIDER - NSDCCPCAREPLAN_GEN_ALL_CORE_FT
PRINCIPAL DISCHARGE DIAGNOSIS  Diagnosis: Non-ST elevation MI (NSTEMI)  Assessment and Plan of Treatment:       SECONDARY DISCHARGE DIAGNOSES  Diagnosis: ESRD on dialysis  Assessment and Plan of Treatment:     Diagnosis: Chronic diastolic congestive heart failure  Assessment and Plan of Treatment:     Diagnosis: HTN (hypertension)  Assessment and Plan of Treatment:     Diagnosis: HLD (hyperlipidemia)  Assessment and Plan of Treatment:     Diagnosis: Anxiety and depression  Assessment and Plan of Treatment:     Diagnosis: BPH (benign prostatic hyperplasia)  Assessment and Plan of Treatment:     Diagnosis: Hypothyroid  Assessment and Plan of Treatment:     Diagnosis: Hyperkalemia  Assessment and Plan of Treatment:

## 2024-12-17 NOTE — PROGRESS NOTE ADULT - SUBJECTIVE AND OBJECTIVE BOX
Department of Cardiology                                                               Division of Interventional Cardiology                                                               [facility] / [place] [id number]                                                                                 [phone]                                                                                                                               Interventional Cardiology Progress / Post-Procedure Note        Subjective/ROS: Patient without acute complaints s/p POCI. Denies CP, SOB, palpitations, N/V, fever/chills, abd pain, numbness/tingling/weakness, other c/o at this time.  ROS negative x 10 systems except as documented as above.      MEDICATIONS  (STANDING):  atorvastatin 80 milliGRAM(s) Oral at bedtime  chlorhexidine 2% Cloths 1 Application(s) Topical daily  clopidogrel Tablet 75 milliGRAM(s) Oral daily  dextrose 5% + sodium chloride 0.45%. 1000 milliLiter(s) (20 mL/Hr) IV Continuous <Continuous>  finasteride 5 milliGRAM(s) Oral daily  heparin  Infusion. 800 Unit(s)/Hr (8 mL/Hr) IV Continuous <Continuous>  isosorbide   mononitrate ER Tablet (IMDUR) 30 milliGRAM(s) Oral daily  levothyroxine 175 MICROGram(s) Oral daily  metoprolol tartrate 25 milliGRAM(s) Oral three times a day  multivitamin 1 Tablet(s) Oral daily  pantoprazole    Tablet 40 milliGRAM(s) Oral daily  PARoxetine 40 milliGRAM(s) Oral daily  polyethylene glycol 3350 17 Gram(s) Oral at bedtime  sevelamer carbonate 800 milliGRAM(s) Oral three times a day with meals    MEDICATIONS  (PRN):  acetaminophen     Tablet .. 650 milliGRAM(s) Oral every 6 hours PRN Temp greater or equal to 38C (100.4F), Mild Pain (1 - 3)  acetaminophen   IVPB .. 1000 milliGRAM(s) IV Intermittent once PRN Temp greater or equal to 38C (100.4F), Moderate Pain (4 - 6)  ALPRAZolam 0.25 milliGRAM(s) Oral every 8 hours PRN anxiety  aluminum hydroxide/magnesium hydroxide/simethicone Suspension 30 milliLiter(s) Oral every 4 hours PRN Dyspepsia  bisacodyl Suppository 10 milliGRAM(s) Rectal daily PRN Constipation  heparin   Injectable 5600 Unit(s) IV Push every 6 hours PRN For aPTT less than 40  melatonin 3 milliGRAM(s) Oral at bedtime PRN Insomnia  morphine  - Injectable 1 milliGRAM(s) IV Push every 2 hours PRN Moderate Pain (4 - 6)  nitroglycerin     SubLingual 0.4 milliGRAM(s) SubLingual every 5 minutes PRN Chest Pain  ondansetron Injectable 4 milliGRAM(s) IV Push every 8 hours PRN Nausea and/or Vomiting      Allergies: No Known Allergies        T(C): 36.5 (24 @ 12:45), Max: 36.8 (24 @ 04:26)  HR: 66 (24 @ 13:15) (61 - 73)  BP: 160/71 (24 @ 13:15) (98/55 - 204/77)  RR: 19 (24 @ 13:15) (12 - 20)  SpO2: 98% (24 @ 13:15) (86% - 98%)  Wt(kg): --  I&O's Summary    16 Dec 2024 07:01  -  17 Dec 2024 07:00  --------------------------------------------------------  IN: 0 mL / OUT: 2200 mL / NET: -2200 mL      Daily     Daily Weight in k.3 (17 Dec 2024 04:26)      Post PCI ECG:  	  - 2024 @ 12:48: NSR @ 71 bpm.  age undetermined inferior infarct.  No acute changes post PCI    LABS:	 	                        9.4    9.70  )-----------( 307      ( 17 Dec 2024 05:30 )             28.9         136  |  97  |  52[H]  ----------------------------<  94  4.3   |  32[H]  |  5.60[H]    Ca    9.0      17 Dec 2024 05:30  Mg     2.5         TPro  7.1  /  Alb  3.1[L]  /  TBili  0.5  /  DBili  x   /  AST  44[H]  /  ALT  23  /  AlkPhos  76  12-17    Troponin I, High Sensitivity (.16. @ 05:00): 06651.8 ng/L  Troponin I, High Sensitivity (.16. @ 05:00): 85344.3 ng/L  Troponin I, High Sensitivity (.15.24 @ 10:42): 67117.7 ng/L  Troponin I, High Sensitivity (12.15.24 @ 03:45): 73104.8 ng/L  Troponin I, High Sensitivity (. @ 23:21): 127.0 ng/L    < from: TTE W or WO Ultrasound Enhancing Agent (12.15.24 @ 11:29) >  CONCLUSIONS:      1. Left ventricular wall thickness is mildly increased. Left ventricular systolic function is normal with an ejection fraction of 59 % by Barnett's method of disks. Regional wall motion abnormalities present.   2. Left atrium is mildly dilated.    < end of copied text >      Physical Exam:  Constitutional: NAD  Neuro: A+O x 3, non-focal, speech clear and intact  HEENT: NC/AT, PERRL, EOMI, anicteric sclerae, oral mucosa pink and moist  Neck: supple, no JVD  CV: regular rate, regular rhythm, +S1S2, no murmurs or rub  Pulm/chest: lung sounds CTA and equal bilaterally, no accessory muscle use noted  Abd: soft, NT, ND  Ext: Right radial band in place.  No active bleeding and/or hematoma.  Neurovascular intact in right hand. NGUYEN x 4, no C/C/E  Pulses: R radial 2+, bilat DP 2+  Skin: warm, well perfused  Psych: calm, appropriate affect                                                                                 Department of Cardiology                                                               Division of Interventional Cardiology                                                               [facility] / [place] [id number]                                                                                 [phone]                                                                                                                               Interventional Cardiology Progress / Post-Procedure Note        Subjective/ROS: Patient without acute complaints s/p PCI. Denies CP, SOB, palpitations, N/V, fever/chills, abd pain, numbness/tingling/weakness, other c/o at this time.  ROS negative x 10 systems except as documented as above.      MEDICATIONS  (STANDING):  atorvastatin 80 milliGRAM(s) Oral at bedtime  chlorhexidine 2% Cloths 1 Application(s) Topical daily  clopidogrel Tablet 75 milliGRAM(s) Oral daily  dextrose 5% + sodium chloride 0.45%. 1000 milliLiter(s) (20 mL/Hr) IV Continuous <Continuous>  finasteride 5 milliGRAM(s) Oral daily  heparin  Infusion. 800 Unit(s)/Hr (8 mL/Hr) IV Continuous <Continuous>  isosorbide   mononitrate ER Tablet (IMDUR) 30 milliGRAM(s) Oral daily  levothyroxine 175 MICROGram(s) Oral daily  metoprolol tartrate 25 milliGRAM(s) Oral three times a day  multivitamin 1 Tablet(s) Oral daily  pantoprazole    Tablet 40 milliGRAM(s) Oral daily  PARoxetine 40 milliGRAM(s) Oral daily  polyethylene glycol 3350 17 Gram(s) Oral at bedtime  sevelamer carbonate 800 milliGRAM(s) Oral three times a day with meals    MEDICATIONS  (PRN):  acetaminophen     Tablet .. 650 milliGRAM(s) Oral every 6 hours PRN Temp greater or equal to 38C (100.4F), Mild Pain (1 - 3)  acetaminophen   IVPB .. 1000 milliGRAM(s) IV Intermittent once PRN Temp greater or equal to 38C (100.4F), Moderate Pain (4 - 6)  ALPRAZolam 0.25 milliGRAM(s) Oral every 8 hours PRN anxiety  aluminum hydroxide/magnesium hydroxide/simethicone Suspension 30 milliLiter(s) Oral every 4 hours PRN Dyspepsia  bisacodyl Suppository 10 milliGRAM(s) Rectal daily PRN Constipation  heparin   Injectable 5600 Unit(s) IV Push every 6 hours PRN For aPTT less than 40  melatonin 3 milliGRAM(s) Oral at bedtime PRN Insomnia  morphine  - Injectable 1 milliGRAM(s) IV Push every 2 hours PRN Moderate Pain (4 - 6)  nitroglycerin     SubLingual 0.4 milliGRAM(s) SubLingual every 5 minutes PRN Chest Pain  ondansetron Injectable 4 milliGRAM(s) IV Push every 8 hours PRN Nausea and/or Vomiting      Allergies: No Known Allergies        T(C): 36.5 (24 @ 12:45), Max: 36.8 (24 @ 04:26)  HR: 66 (24 @ 13:15) (61 - 73)  BP: 160/71 (24 @ 13:15) (98/55 - 204/77)  RR: 19 (24 @ 13:15) (12 - 20)  SpO2: 98% (24 @ 13:15) (86% - 98%)  Wt(kg): --  I&O's Summary    16 Dec 2024 07:01  -  17 Dec 2024 07:00  --------------------------------------------------------  IN: 0 mL / OUT: 2200 mL / NET: -2200 mL      Daily     Daily Weight in k.3 (17 Dec 2024 04:26)      Post PCI ECG:  	  - 2024 @ 12:48: NSR @ 71 bpm.  age undetermined inferior infarct.  No acute changes post PCI    LABS:	 	                        9.4    9.70  )-----------( 307      ( 17 Dec 2024 05:30 )             28.9         136  |  97  |  52[H]  ----------------------------<  94  4.3   |  32[H]  |  5.60[H]    Ca    9.0      17 Dec 2024 05:30  Mg     2.5         TPro  7.1  /  Alb  3.1[L]  /  TBili  0.5  /  DBili  x   /  AST  44[H]  /  ALT  23  /  AlkPhos  76  12-17    Troponin I, High Sensitivity (24 @ 05:00): 02752.8 ng/L  Troponin I, High Sensitivity (24 @ 05:00): 03374.3 ng/L  Troponin I, High Sensitivity (12.15.24 @ 10:42): 31257.7 ng/L  Troponin I, High Sensitivity (12.15.24 @ 03:45): 88177.8 ng/L  Troponin I, High Sensitivity (24 @ 23:21): 127.0 ng/L    < from: TTE W or WO Ultrasound Enhancing Agent (12.15.24 @ 11:29) >  CONCLUSIONS:      1. Left ventricular wall thickness is mildly increased. Left ventricular systolic function is normal with an ejection fraction of 59 % by Barnett's method of disks. Regional wall motion abnormalities present.   2. Left atrium is mildly dilated.    < end of copied text >      Physical Exam:  Constitutional: NAD  Neuro: A+O x 3, non-focal, speech clear and intact  HEENT: NC/AT, PERRL, EOMI, anicteric sclerae, oral mucosa pink and moist  Neck: supple, no JVD  CV: regular rate, regular rhythm, +S1S2, no murmurs or rub  Pulm/chest: lung sounds CTA and equal bilaterally, no accessory muscle use noted  Abd: soft, NT, ND  Ext: Right radial band in place.  No active bleeding and/or hematoma.  Neurovascular intact in right hand. NGUYEN x 4, no C/C/E  Pulses: R radial 2+, bilat DP 2+  Skin: warm, well perfused  Psych: calm, appropriate affect

## 2024-12-17 NOTE — DISCHARGE NOTE PROVIDER - NSDCCPTREATMENT_GEN_ALL_CORE_FT
PRINCIPAL PROCEDURE  Procedure: Percutaneous coronary intervention, primary  Findings and Treatment: PCI/BOB x1 to LCx on 12/17/2024

## 2024-12-17 NOTE — PROGRESS NOTE ADULT - ASSESSMENT
79-year-old male with past med history of HTN, HLD, dialysis with left arm fistula, Monday Wednesday Friday dialysis who presents emergency department for chest pain.  This started just as patient was going to sleep associate with nausea vomiting pain rating down his left upper extremity as well as to his jaw and neck.  Patient has never had pain like this before.  Patient is supposed to be taking aspirin, however stopped because he did not want to take it anymore.  Denies history of stents.  Discussed with interventional cardiology recommending heparin, aspirin, Brilinta and repeat EKG. 12/15/2024 79-year-old male with past med history of HTN, HLD, dialysis with left arm fistula, Monday Wednesday Friday dialysis who presents emergency department for chest pain.  This started just as patient was going to sleep associate with nausea vomiting pain rating down his left upper extremity as well as to his jaw and neck.  Patient has never had pain like this before.  Patient is supposed to be taking aspirin, however stopped because he did not want to take it anymore.  Denies history of stents.  Discussed with interventional cardiology recommending heparin, aspirin, Brilinta and repeat EKG  .... w 12/15/2024 w 12.3   .... No obvious infn noted   .... Trop 12/14 - 12/15 Tr [id number]   .... 12/15/2024 plavx 75   .... 12/14 11p iv ufh   .... 12/15/2024 ATORVASTAT 80   .... 12/15/2024 IMDUR 30   .... 12/15/2024 METOPROLOL 25.3   .... 12/15/2024 MS 1 Q 2 h p  .... pbnp   .... Hb 12/15/2024 Hb 10.7   .... Plt 12/15/2024 Plt 240   .... 12/15/2024 On iv ufh   .... monitor   . ESRD  .... Na 12/15/2024 na 135  .... K 12/15/2024 K 4.6   .... CO2 12/15/2024 co2 28   .... AG 12/15/2024 ag 8   .... Alb 12/15/2024 alb 3.4   .... Cr 12/15/2024 Cr 5.8   .... Ca acetate 1334 x 3   .... HD as per renal   . BPH  .... 12/15/2024 FINASTAERIDE 5  ENDO.  .... 12/15/2024 LEVOXYL 175  NEURO.  .... 12/15/2024 Paxil 40   .... 12/15/2024 alprazolam .25 x 3 p   . 12/15/2024 chest discomfort  . 12/15/2024 Nausea vomiting   PROBLEMS .  . NON TAYLA MI   . ESRD   PMH.  . HTN,   . HLD,   . dialysis with left arm

## 2024-12-17 NOTE — DISCHARGE NOTE PROVIDER - PROVIDER TOKENS
PROVIDER:[TOKEN:[[id number]],SCHEDULEDAPPT:[12/23/2024],SCHEDULEDAPPTTIME:[01:00 PM]] PROVIDER:[TOKEN:[[id number]],FOLLOWUP:[1-3 days],SCHEDULEDAPPTTIME:[01:00 PM]],PROVIDER:[TOKEN:[[id number]],FOLLOWUP:[1-3 days]]

## 2024-12-17 NOTE — CHART NOTE - NSCHARTNOTEFT_GEN_A_CORE
I had discussion with patient regarding code status, patient is planned to undergo coronary angiogram with possible PCI. The patient would like to rescind the existing DNR/DNI for the duration of the procedure and immediately post-operative allowing full resuscitative efforts in the event of cardiac or respiratory arrest. If patient under goes PCI, he is willing to rescind DNR/DNI status for 30 days following procedure. Patient has been informed of this change and understands the implications.     Patient is FULL CODE    Elier Vazquez MD    Interventional Cardiology

## 2024-12-17 NOTE — PROGRESS NOTE ADULT - SUBJECTIVE AND OBJECTIVE BOX
[facility]                                                       Dr. Lassiter, Dr. Vega, Dr. Galloway, Dr. Piedra, Dr. Mccullough, [facility], [facility], [address]                                                 [address] [address]                                      Ph: [phone]  Fax: [phone]                                         Ph: [phone]  Fax: [phone]      Patient is a 79y old  Male who presents with a chief complaint of chest pain (15 Dec 2024 05:23)  Patient seen in follow up for ESRD on HD.        PAST MEDICAL HISTORY:  ESRD on dialysis    Thyroid cancer    Lung cancer    HLD (hyperlipidemia)    Anxiety and depression    Acquired hypothyroidism    ESRD on dialysis      MEDICATIONS  (STANDING):  atorvastatin 80 milliGRAM(s) Oral at bedtime  chlorhexidine 2% Cloths 1 Application(s) Topical daily  dextrose 5% + sodium chloride 0.45%. 1000 milliLiter(s) (20 mL/Hr) IV Continuous <Continuous>  finasteride 5 milliGRAM(s) Oral daily  isosorbide   mononitrate ER Tablet (IMDUR) 30 milliGRAM(s) Oral daily  levothyroxine 175 MICROGram(s) Oral daily  metoprolol tartrate 25 milliGRAM(s) Oral three times a day  multivitamin 1 Tablet(s) Oral daily  pantoprazole    Tablet 40 milliGRAM(s) Oral daily  PARoxetine 40 milliGRAM(s) Oral daily  polyethylene glycol 3350 17 Gram(s) Oral at bedtime  sevelamer carbonate 800 milliGRAM(s) Oral three times a day with meals    MEDICATIONS  (PRN):  acetaminophen     Tablet .. 650 milliGRAM(s) Oral every 6 hours PRN Temp greater or equal to 38C (100.4F), Mild Pain (1 - 3)  acetaminophen   IVPB .. 1000 milliGRAM(s) IV Intermittent once PRN Temp greater or equal to 38C (100.4F), Moderate Pain (4 - 6)  ALPRAZolam 0.25 milliGRAM(s) Oral every 8 hours PRN anxiety  aluminum hydroxide/magnesium hydroxide/simethicone Suspension 30 milliLiter(s) Oral every 4 hours PRN Dyspepsia  bisacodyl Suppository 10 milliGRAM(s) Rectal daily PRN Constipation  melatonin 3 milliGRAM(s) Oral at bedtime PRN Insomnia  morphine  - Injectable 1 milliGRAM(s) IV Push every 2 hours PRN Moderate Pain (4 - 6)  nitroglycerin     SubLingual 0.4 milliGRAM(s) SubLingual every 5 minutes PRN Chest Pain  ondansetron Injectable 4 milliGRAM(s) IV Push every 8 hours PRN Nausea and/or Vomiting    T(C): 36.5 (12-17-24 @ 12:45), Max: 37.3 (12-16-24 @ 05:00)  HR: 71 (12-17-24 @ 14:15) (61 - 78)  BP: 161/70 (12-17-24 @ 14:15) (98/55 - 204/77)  RR: 16 (12-17-24 @ 14:15)  SpO2: 94% (12-17-24 @ 14:15)  Wt(kg): --  I&O's Detail    16 Dec 2024 07:01  -  17 Dec 2024 07:00  --------------------------------------------------------  IN:  Total IN: 0 mL    OUT:    Other (mL): 2000 mL    Voided (mL): 200 mL  Total OUT: 2200 mL    Total NET: -2200 mL              PHYSICAL EXAM:  General: No distress  Respiratory: b/l air entry  Cardiovascular: S1 S2  Gastrointestinal: soft  Extremities:  no edema, left AVF                             LABORATORY:                        9.4    9.70  )-----------( 307      ( 17 Dec 2024 05:30 )             28.9     12-17    136  |  97  |  52[H]  ----------------------------<  94  4.3   |  32[H]  |  5.60[H]    Ca    9.0      17 Dec 2024 05:30  Mg     2.5     12-16    TPro  7.1  /  Alb  3.1[L]  /  TBili  0.5  /  DBili  x   /  AST  44[H]  /  ALT  23  /  AlkPhos  76  12-17    Sodium: 136 mmol/L (12-17 @ 05:30)  Sodium: 137 mmol/L (12-16 @ 13:30)  Sodium: 134 mmol/L (12-16 @ 09:30)  Sodium: 133 mmol/L (12-16 @ 05:00)    Potassium: 4.3 mmol/L (12-17 @ 05:30)  Potassium: 3.7 mmol/L (12-16 @ 13:30)  Potassium: 5.6 mmol/L (12-16 @ 09:30)  Potassium: 7.0 mmol/L (12-16 @ 05:00)    Hemoglobin: 9.4 g/dL (12-17 @ 05:30)  Hemoglobin: 9.3 g/dL (12-16 @ 22:15)  Hemoglobin: 10.0 g/dL (12-16 @ 14:21)  Hemoglobin: 9.6 g/dL (12-16 @ 13:30)    Creatinine, Serum 5.60 (12-17 @ 05:30)  Creatinine, Serum 3.90 (12-16 @ 13:30)  Creatinine, Serum 7.40 (12-16 @ 09:30)  Creatinine, Serum 7.30 (12-16 @ 05:00)        LIVER FUNCTIONS - ( 17 Dec 2024 05:30 )  Alb: 3.1 g/dL / Pro: 7.1 g/dL / ALK PHOS: 76 U/L / ALT: 23 U/L / AST: 44 U/L / GGT: x           Urinalysis Basic - ( 17 Dec 2024 05:30 )    Color: x / Appearance: x / SG: x / pH: x  Gluc: 94 mg/dL / Ketone: x  / Bili: x / Urobili: x   Blood: x / Protein: x / Nitrite: x   Leuk Esterase: x / RBC: x / WBC x   Sq Epi: x / Non Sq Epi: x / Bacteria: x

## 2024-12-17 NOTE — DISCHARGE NOTE PROVIDER - NSDCCAREPROVSEEN_GEN_ALL_CORE_FT
Dolores, Francisco Naqvi, Sharath Vazquez, Elier Verma, Ivan Lassiter, Brandon Gonzalez, Bradford Bedoya, Rosalee Stanford, Elton Mace, Melania Mccullough, Qian Higgins, Lynn Garcia, Andrei Power, Halima Iglesias, Mackenzie LAWLER

## 2024-12-17 NOTE — DISCHARGE NOTE PROVIDER - CARE PROVIDER_API CALL
Elier Vazquez  Interventional Cardiology  [address] [place] [address]  Phone: [phone]  Fax: [phone]  Scheduled Appointment: 12/23/2024 01:00 PM   Elier Vazquez  Interventional Cardiology  [address] [place] [address]  Phone: [phone]  Fax: [phone]  Follow Up Time: 1-3 days    Brandon Lassiter  Nephrology  [address] [facility], [address] [address]  Phone: [phone]  Fax: [phone]  Follow Up Time: 1-3 days

## 2024-12-18 ENCOUNTER — TRANSCRIPTION ENCOUNTER (OUTPATIENT)
Age: 79
End: 2024-12-18

## 2024-12-18 ENCOUNTER — INPATIENT (INPATIENT)
Facility: HOSPITAL | Age: 79
LOS: 1 days | Discharge: HOME HEALTH | DRG: 321 | End: 2024-12-20
Attending: INTERNAL MEDICINE | Admitting: INTERNAL MEDICINE
Payer: MEDICARE

## 2024-12-18 VITALS
SYSTOLIC BLOOD PRESSURE: 145 MMHG | RESPIRATION RATE: 18 BRPM | HEART RATE: 69 BPM | DIASTOLIC BLOOD PRESSURE: 53 MMHG | OXYGEN SATURATION: 98 %

## 2024-12-18 VITALS
HEART RATE: 72 BPM | RESPIRATION RATE: 18 BRPM | TEMPERATURE: 99 F | OXYGEN SATURATION: 96 % | SYSTOLIC BLOOD PRESSURE: 149 MMHG | DIASTOLIC BLOOD PRESSURE: 67 MMHG

## 2024-12-18 DIAGNOSIS — I24.9 ACUTE ISCHEMIC HEART DISEASE, UNSPECIFIED: ICD-10-CM

## 2024-12-18 DIAGNOSIS — I21.4 NON-ST ELEVATION (NSTEMI) MYOCARDIAL INFARCTION: ICD-10-CM

## 2024-12-18 LAB
ANION GAP SERPL CALC-SCNC: 9 MMOL/L — SIGNIFICANT CHANGE UP (ref 5–17)
BASOPHILS # BLD AUTO: 0.03 K/UL — SIGNIFICANT CHANGE UP (ref 0–0.2)
BASOPHILS NFR BLD AUTO: 0.3 % — SIGNIFICANT CHANGE UP (ref 0–2)
BUN SERPL-MCNC: 77 MG/DL — HIGH (ref 7–23)
CALCIUM SERPL-MCNC: 8.8 MG/DL — SIGNIFICANT CHANGE UP (ref 8.5–10.1)
CHLORIDE SERPL-SCNC: 98 MMOL/L — SIGNIFICANT CHANGE UP (ref 96–108)
CO2 SERPL-SCNC: 29 MMOL/L — SIGNIFICANT CHANGE UP (ref 22–31)
CREAT SERPL-MCNC: 7.6 MG/DL — HIGH (ref 0.5–1.3)
EGFR: 7 ML/MIN/1.73M2 — LOW
EOSINOPHIL # BLD AUTO: 0.15 K/UL — SIGNIFICANT CHANGE UP (ref 0–0.5)
EOSINOPHIL NFR BLD AUTO: 1.6 % — SIGNIFICANT CHANGE UP (ref 0–6)
GLUCOSE SERPL-MCNC: 96 MG/DL — SIGNIFICANT CHANGE UP (ref 70–99)
HCT VFR BLD CALC: 28.4 % — LOW (ref 39–50)
HGB BLD-MCNC: 9.3 G/DL — LOW (ref 13–17)
IMM GRANULOCYTES NFR BLD AUTO: 0.3 % — SIGNIFICANT CHANGE UP (ref 0–0.9)
LYMPHOCYTES # BLD AUTO: 0.97 K/UL — LOW (ref 1–3.3)
LYMPHOCYTES # BLD AUTO: 10.4 % — LOW (ref 13–44)
MCHC RBC-ENTMCNC: 32.7 G/DL — SIGNIFICANT CHANGE UP (ref 32–36)
MCHC RBC-ENTMCNC: 34.1 PG — HIGH (ref 27–34)
MCV RBC AUTO: 104 FL — HIGH (ref 80–100)
MONOCYTES # BLD AUTO: 0.65 K/UL — SIGNIFICANT CHANGE UP (ref 0–0.9)
MONOCYTES NFR BLD AUTO: 7 % — SIGNIFICANT CHANGE UP (ref 2–14)
NEUTROPHILS # BLD AUTO: 7.52 K/UL — HIGH (ref 1.8–7.4)
NEUTROPHILS NFR BLD AUTO: 80.4 % — HIGH (ref 43–77)
NRBC # BLD: 0 /100 WBCS — SIGNIFICANT CHANGE UP (ref 0–0)
PLATELET # BLD AUTO: 279 K/UL — SIGNIFICANT CHANGE UP (ref 150–400)
POTASSIUM SERPL-MCNC: 5.1 MMOL/L — SIGNIFICANT CHANGE UP (ref 3.5–5.3)
POTASSIUM SERPL-SCNC: 5.1 MMOL/L — SIGNIFICANT CHANGE UP (ref 3.5–5.3)
RBC # BLD: 2.73 M/UL — LOW (ref 4.2–5.8)
RBC # FLD: 14.8 % — HIGH (ref 10.3–14.5)
SODIUM SERPL-SCNC: 136 MMOL/L — SIGNIFICANT CHANGE UP (ref 135–145)
WBC # BLD: 9.35 K/UL — SIGNIFICANT CHANGE UP (ref 3.8–10.5)
WBC # FLD AUTO: 9.35 K/UL — SIGNIFICANT CHANGE UP (ref 3.8–10.5)

## 2024-12-18 PROCEDURE — 80053 COMPREHEN METABOLIC PANEL: CPT

## 2024-12-18 PROCEDURE — 92978 ENDOLUMINL IVUS OCT C 1ST: CPT | Mod: LC

## 2024-12-18 PROCEDURE — 71045 X-RAY EXAM CHEST 1 VIEW: CPT

## 2024-12-18 PROCEDURE — 93005 ELECTROCARDIOGRAM TRACING: CPT

## 2024-12-18 PROCEDURE — 96374 THER/PROPH/DIAG INJ IV PUSH: CPT

## 2024-12-18 PROCEDURE — 86900 BLOOD TYPING SEROLOGIC ABO: CPT

## 2024-12-18 PROCEDURE — 99223 1ST HOSP IP/OBS HIGH 75: CPT

## 2024-12-18 PROCEDURE — C1769: CPT

## 2024-12-18 PROCEDURE — 86803 HEPATITIS C AB TEST: CPT

## 2024-12-18 PROCEDURE — 82962 GLUCOSE BLOOD TEST: CPT

## 2024-12-18 PROCEDURE — C1894: CPT

## 2024-12-18 PROCEDURE — 84100 ASSAY OF PHOSPHORUS: CPT

## 2024-12-18 PROCEDURE — 83690 ASSAY OF LIPASE: CPT

## 2024-12-18 PROCEDURE — C1874: CPT

## 2024-12-18 PROCEDURE — 93010 ELECTROCARDIOGRAM REPORT: CPT

## 2024-12-18 PROCEDURE — 87040 BLOOD CULTURE FOR BACTERIA: CPT

## 2024-12-18 PROCEDURE — C1753: CPT

## 2024-12-18 PROCEDURE — 80048 BASIC METABOLIC PNL TOTAL CA: CPT

## 2024-12-18 PROCEDURE — 86706 HEP B SURFACE ANTIBODY: CPT

## 2024-12-18 PROCEDURE — 83880 ASSAY OF NATRIURETIC PEPTIDE: CPT

## 2024-12-18 PROCEDURE — 84443 ASSAY THYROID STIM HORMONE: CPT

## 2024-12-18 PROCEDURE — 85610 PROTHROMBIN TIME: CPT

## 2024-12-18 PROCEDURE — 85025 COMPLETE CBC W/AUTO DIFF WBC: CPT

## 2024-12-18 PROCEDURE — C1725: CPT

## 2024-12-18 PROCEDURE — 86704 HEP B CORE ANTIBODY TOTAL: CPT

## 2024-12-18 PROCEDURE — 93458 L HRT ARTERY/VENTRICLE ANGIO: CPT | Mod: 59

## 2024-12-18 PROCEDURE — C9600: CPT | Mod: LC

## 2024-12-18 PROCEDURE — 86901 BLOOD TYPING SEROLOGIC RH(D): CPT

## 2024-12-18 PROCEDURE — 84484 ASSAY OF TROPONIN QUANT: CPT

## 2024-12-18 PROCEDURE — 93306 TTE W/DOPPLER COMPLETE: CPT

## 2024-12-18 PROCEDURE — 99261: CPT

## 2024-12-18 PROCEDURE — 36415 COLL VENOUS BLD VENIPUNCTURE: CPT

## 2024-12-18 PROCEDURE — 99285 EMERGENCY DEPT VISIT HI MDM: CPT | Mod: 25

## 2024-12-18 PROCEDURE — 83036 HEMOGLOBIN GLYCOSYLATED A1C: CPT

## 2024-12-18 PROCEDURE — 83605 ASSAY OF LACTIC ACID: CPT

## 2024-12-18 PROCEDURE — C1887: CPT

## 2024-12-18 PROCEDURE — 96375 TX/PRO/DX INJ NEW DRUG ADDON: CPT

## 2024-12-18 PROCEDURE — 85730 THROMBOPLASTIN TIME PARTIAL: CPT

## 2024-12-18 PROCEDURE — 99497 ADVNCD CARE PLAN 30 MIN: CPT | Mod: 25

## 2024-12-18 PROCEDURE — 80061 LIPID PANEL: CPT

## 2024-12-18 PROCEDURE — 86850 RBC ANTIBODY SCREEN: CPT

## 2024-12-18 PROCEDURE — 83735 ASSAY OF MAGNESIUM: CPT

## 2024-12-18 PROCEDURE — 85027 COMPLETE CBC AUTOMATED: CPT

## 2024-12-18 PROCEDURE — 87340 HEPATITIS B SURFACE AG IA: CPT

## 2024-12-18 RX ORDER — ALPRAZOLAM 0.5 MG
1 TABLET ORAL
Qty: 0 | Refills: 0 | DISCHARGE
Start: 2024-12-18

## 2024-12-18 RX ORDER — POLYETHYLENE GLYCOL 3350 17 G/17G
17 POWDER, FOR SOLUTION ORAL
Qty: 0 | Refills: 0 | DISCHARGE
Start: 2024-12-18

## 2024-12-18 RX ORDER — METOPROLOL TARTRATE 100 MG/1
1 TABLET, FILM COATED ORAL
Qty: 0 | Refills: 0 | DISCHARGE
Start: 2024-12-18

## 2024-12-18 RX ORDER — ACETAMINOPHEN 500MG 500 MG/1
650 TABLET, COATED ORAL EVERY 6 HOURS
Refills: 0 | Status: DISCONTINUED | OUTPATIENT
Start: 2024-12-18 | End: 2024-12-20

## 2024-12-18 RX ORDER — ISOSORBIDE MONONITRATE 10 MG
30 TABLET ORAL DAILY
Refills: 0 | Status: DISCONTINUED | OUTPATIENT
Start: 2024-12-18 | End: 2024-12-20

## 2024-12-18 RX ORDER — ISOSORBIDE MONONITRATE 10 MG
1 TABLET ORAL
Qty: 0 | Refills: 0 | DISCHARGE
Start: 2024-12-18

## 2024-12-18 RX ORDER — HEPARIN SODIUM,PORCINE 1000/ML
5000 VIAL (ML) INJECTION EVERY 8 HOURS
Refills: 0 | Status: DISCONTINUED | OUTPATIENT
Start: 2024-12-18 | End: 2024-12-18

## 2024-12-18 RX ORDER — ACETAMINOPHEN, DIPHENHYDRAMINE HCL, PHENYLEPHRINE HCL 325; 25; 5 MG/1; MG/1; MG/1
1 TABLET ORAL
Qty: 0 | Refills: 0 | DISCHARGE
Start: 2024-12-18

## 2024-12-18 RX ORDER — CLOPIDOGREL 75 MG/1
75 TABLET, FILM COATED ORAL DAILY
Refills: 0 | Status: DISCONTINUED | OUTPATIENT
Start: 2024-12-18 | End: 2024-12-20

## 2024-12-18 RX ORDER — POLYETHYLENE GLYCOL 3350 17 G/17G
17 POWDER, FOR SOLUTION ORAL AT BEDTIME
Refills: 0 | Status: DISCONTINUED | OUTPATIENT
Start: 2024-12-18 | End: 2024-12-20

## 2024-12-18 RX ORDER — PAROXETINE HYDROCHLORIDE HEMIHYDRATE 37.5 MG/1
1 TABLET, FILM COATED, EXTENDED RELEASE ORAL
Qty: 0 | Refills: 0 | DISCHARGE
Start: 2024-12-18

## 2024-12-18 RX ORDER — HEPARIN SODIUM,PORCINE 1000/ML
5000 VIAL (ML) INJECTION
Qty: 0 | Refills: 0 | DISCHARGE
Start: 2024-12-18

## 2024-12-18 RX ORDER — LEVOTHYROXINE SODIUM 150 MCG
1 TABLET ORAL
Refills: 0 | DISCHARGE
Start: 2024-12-18

## 2024-12-18 RX ORDER — METOPROLOL TARTRATE 100 MG/1
25 TABLET, FILM COATED ORAL THREE TIMES A DAY
Refills: 0 | Status: DISCONTINUED | OUTPATIENT
Start: 2024-12-18 | End: 2024-12-20

## 2024-12-18 RX ORDER — LEVOTHYROXINE SODIUM 150 MCG
175 TABLET ORAL DAILY
Refills: 0 | Status: DISCONTINUED | OUTPATIENT
Start: 2024-12-18 | End: 2024-12-20

## 2024-12-18 RX ORDER — CLOPIDOGREL 75 MG/1
1 TABLET, FILM COATED ORAL
Qty: 0 | Refills: 0 | DISCHARGE
Start: 2024-12-18

## 2024-12-18 RX ORDER — ALPRAZOLAM 0.5 MG
0.25 TABLET ORAL EVERY 8 HOURS
Refills: 0 | Status: DISCONTINUED | OUTPATIENT
Start: 2024-12-18 | End: 2024-12-20

## 2024-12-18 RX ORDER — ACETAMINOPHEN 500MG 500 MG/1
2 TABLET, COATED ORAL
Qty: 0 | Refills: 0 | DISCHARGE
Start: 2024-12-18

## 2024-12-18 RX ORDER — ACETAMINOPHEN 500MG 500 MG/1
100 TABLET, COATED ORAL
Qty: 0 | Refills: 0 | DISCHARGE
Start: 2024-12-18

## 2024-12-18 RX ORDER — PAROXETINE HYDROCHLORIDE HEMIHYDRATE 37.5 MG/1
30 TABLET, FILM COATED, EXTENDED RELEASE ORAL DAILY
Refills: 0 | Status: DISCONTINUED | OUTPATIENT
Start: 2024-12-18 | End: 2024-12-20

## 2024-12-18 RX ORDER — PANTOPRAZOLE SODIUM 40 MG/1
1 TABLET, DELAYED RELEASE ORAL
Qty: 0 | Refills: 0 | DISCHARGE
Start: 2024-12-18

## 2024-12-18 RX ORDER — CYANOCOBALAMIN/FOLIC AC/VIT B6 1-2.2-25MG
1 TABLET ORAL DAILY
Refills: 0 | Status: DISCONTINUED | OUTPATIENT
Start: 2024-12-18 | End: 2024-12-20

## 2024-12-18 RX ORDER — ACETAMINOPHEN, DIPHENHYDRAMINE HCL, PHENYLEPHRINE HCL 325; 25; 5 MG/1; MG/1; MG/1
3 TABLET ORAL AT BEDTIME
Refills: 0 | Status: DISCONTINUED | OUTPATIENT
Start: 2024-12-18 | End: 2024-12-20

## 2024-12-18 RX ORDER — SEVELAMER CARBONATE 800 MG/1
800 TABLET, FILM COATED ORAL
Refills: 0 | Status: DISCONTINUED | OUTPATIENT
Start: 2024-12-18 | End: 2024-12-20

## 2024-12-18 RX ORDER — PANTOPRAZOLE SODIUM 40 MG/1
40 TABLET, DELAYED RELEASE ORAL
Refills: 0 | Status: DISCONTINUED | OUTPATIENT
Start: 2024-12-18 | End: 2024-12-20

## 2024-12-18 RX ADMIN — Medication 175 MICROGRAM(S): at 06:11

## 2024-12-18 RX ADMIN — Medication 1 TABLET(S): at 13:14

## 2024-12-18 RX ADMIN — SEVELAMER CARBONATE 800 MILLIGRAM(S): 800 TABLET, FILM COATED ORAL at 13:14

## 2024-12-18 RX ADMIN — SEVELAMER CARBONATE 800 MILLIGRAM(S): 800 TABLET, FILM COATED ORAL at 18:13

## 2024-12-18 RX ADMIN — Medication 5000 UNIT(S): at 06:11

## 2024-12-18 RX ADMIN — CHLORHEXIDINE GLUCONATE 1 APPLICATION(S): 1.2 RINSE ORAL at 12:26

## 2024-12-18 RX ADMIN — PAROXETINE HYDROCHLORIDE HEMIHYDRATE 40 MILLIGRAM(S): 37.5 TABLET, FILM COATED, EXTENDED RELEASE ORAL at 13:13

## 2024-12-18 RX ADMIN — PANTOPRAZOLE SODIUM 40 MILLIGRAM(S): 40 TABLET, DELAYED RELEASE ORAL at 13:14

## 2024-12-18 RX ADMIN — CLOPIDOGREL 75 MILLIGRAM(S): 75 TABLET, FILM COATED ORAL at 09:10

## 2024-12-18 RX ADMIN — Medication 81 MILLIGRAM(S): at 09:10

## 2024-12-18 RX ADMIN — Medication 5000 UNIT(S): at 13:15

## 2024-12-18 RX ADMIN — Medication 30 MILLIGRAM(S): at 13:14

## 2024-12-18 RX ADMIN — SEVELAMER CARBONATE 800 MILLIGRAM(S): 800 TABLET, FILM COATED ORAL at 09:09

## 2024-12-18 RX ADMIN — METOPROLOL TARTRATE 25 MILLIGRAM(S): 100 TABLET, FILM COATED ORAL at 13:14

## 2024-12-18 RX ADMIN — METOPROLOL TARTRATE 25 MILLIGRAM(S): 100 TABLET, FILM COATED ORAL at 06:11

## 2024-12-18 RX ADMIN — Medication 5 MILLIGRAM(S): at 13:14

## 2024-12-18 NOTE — H&P ADULT - NSHPLABSRESULTS_GEN_ALL_CORE
LABS:                        9.3    8.91  )-----------( 320      ( 19 Dec 2024 00:41 )             29.4     12-19    138  |  96  |  43[H]  ----------------------------<  98  4.5   |  25  |  4.85[H]    Ca    9.6      19 Dec 2024 00:41  Phos  4.3     12-19  Mg     2.4     12-19    TPro  7.1  /  Alb  3.1[L]  /  TBili  0.5  /  DBili  x   /  AST  44[H]  /  ALT  23  /  AlkPhos  76  12-17    PT/INR - ( 19 Dec 2024 00:41 )   PT: 13.6 sec;   INR: 1.20 ratio         PTT - ( 19 Dec 2024 00:41 )  PTT:30.8 sec      Urinalysis Basic - ( 19 Dec 2024 00:41 )    Color: x / Appearance: x / SG: x / pH: x  Gluc: 98 mg/dL / Ketone: x  / Bili: x / Urobili: x   Blood: x / Protein: x / Nitrite: x   Leuk Esterase: x / RBC: x / WBC x   Sq Epi: x / Non Sq Epi: x / Bacteria: x        Culture - Blood (collected 16 Dec 2024 13:37)  Source: .Blood BLOOD  Preliminary Report (18 Dec 2024 19:01):    No growth at 48 Hours    Culture - Blood (collected 16 Dec 2024 13:30)  Source: .Blood BLOOD  Preliminary Report (18 Dec 2024 19:01):    No growth at 48 Hours        IMAGING/ADDITIONAL TESTS:    EKG (12/18/24 from [facility]) independent read: NSR, HR 76, QTc 465, biphasic/TWI in inferolateral leads (seems slightly improved compared to initial presenting EKG)    < from: TTE W or WO Ultrasound Enhancing Agent (12.15.24 @ 11:29) >  CONCLUSIONS:   1. Left ventricular wall thickness is mildly increased. Left ventricular systolic function is normal with an ejection fraction of 59 % by Barnett's method of disks. Regional wall motion abnormalities present.   2. Left atrium is mildly dilated.  < end of copied text >

## 2024-12-18 NOTE — CASE MANAGEMENT PROGRESS NOTE - NSCMPROGRESSNOTE_GEN_ALL_CORE
Chart reviewed from a case management perspective.  Patient is s/p cardiac cath and pending transfer to [facility] for further cardiac intervention.  Will remain available.

## 2024-12-18 NOTE — PROGRESS NOTE ADULT - ASSESSMENT
79-year-old male with past med history of HTN, HLD, dialysis with left arm fistula, Monday Wednesday Friday dialysis who presents emergency department for chest pain.  This started just as patient was going to sleep associate with nausea vomiting pain rating down his left upper extremity as well as to his jaw and neck.  Patient has never had pain like this before.  Patient is supposed to be taking aspirin, however stopped because he did not want to take it anymore.  Denies history of stents.  Discussed with interventional cardiology recommending heparin, aspirin, Brilinta and repeat EKG. 12/15/2024 79-year-old male with past med history of HTN, HLD, dialysis with left arm fistula, Monday Wednesday Friday dialysis who presents emergency department for chest pain.  This started just as patient was going to sleep associate with nausea vomiting pain rating down his left upper extremity as well as to his jaw and neck.  Patient has never had pain like this before.  Patient is supposed to be taking aspirin, however stopped because he did not want to take it anymore.  Denies history of stents.  Discussed with interventional cardiology recommending heparin, aspirin, Brilinta and repeat EKG  .... w 12/15/2024 w 12.3   .... No obvious infn noted   .... Trop 12/14 - 12/15 Tr [id number]   .... 12/15/2024 plavx 75   .... 12/14 11p iv ufh   .... 12/15/2024 ATORVASTAT 80   .... 12/15/2024 IMDUR 30   .... 12/15/2024 METOPROLOL 25.3   .... 12/15/2024 MS 1 Q 2 h p  .... pbnp   .... Hb 12/15/2024 Hb 10.7   .... Plt 12/15/2024 Plt 240   .... 12/15/2024 On iv ufh   .... monitor   . ESRD  .... Na 12/15/2024 na 135  .... K 12/15/2024 K 4.6   .... CO2 12/15/2024 co2 28   .... AG 12/15/2024 ag 8   .... Alb 12/15/2024 alb 3.4   .... Cr 12/15/2024 Cr 5.8   .... Ca acetate 1334 x 3   .... HD as per renal   . BPH  .... 12/15/2024 FINASTAERIDE 5  ENDO.  .... 12/15/2024 LEVOXYL 175  NEURO.  .... 12/15/2024 Paxil 40   .... 12/15/2024 alprazolam .25 x 3 p   . 12/15/2024 chest discomfort  . 12/15/2024 Nausea vomiting   PROBLEMS .  . NON TAYLA MI   . ESRD   PMH.  . HTN,   . HLD,   . dialysis with left arm

## 2024-12-18 NOTE — H&P CARDIOLOGY - HISTORY OF PRESENT ILLNESS
79-year-old male with past med history of HTN, HLD, ESRD on hemodialysis with left arm fistula (Monday, Wednesday, Friday), who presents to [facility] emergency department with c/o chest pain.  The pain started just as patient was going to sleep, associated with nausea vomiting pain rating down his left upper extremity as well as to his jaw and neck.  Patient states he has never had pain like this before, is supposed to be taking aspirin, however he stopped because he did not want to take it anymore.  Denies history of stents.  Discussed transfer to [facility] for cardiac cath with Dr. JD Vazquez, patient agreed, now admitted to [facility] bed 5.

## 2024-12-18 NOTE — PROGRESS NOTE ADULT - PROBLEM SELECTOR PLAN 1
- Plan for staged PCI with atherectomy of residual RCA disease at [facility] on Thursday, 12/19/2024.  Will need transfer arranged to hospitalist service in anticipation of staged PCI.    - Post cath/PCI routine VS, access site, neuro-vascular monitoring and RUE post access precautions ordered  - Post cath access site precautions reviewed with pt who verbalized good understanding  - No post cath hydration d/t ESRD on HD  - Bedrest. May get OOB 30 minutes after radial band removed if wrist and hemodynamics remain stable   - F/U labs and EKG in am  - Continue dual anti platelet therapy with aspirin AND clopidogrel.  Discontinue heparin gtt  - Pt education provided/reinforced re: importance of strict adherence to uninterrupted DAPT for minimum of 9-12 months (cardiologist will determine duration)  - Patient educated on benefits of Cardiac Rehab Program; referral provided to patient. Referral faxed and copy placed in medical record. Patient given list of locations with phone numbers of local rehab facilities and advised to contact their insurance company for participating providers. Patient educated on need to bring discharge documents including cardiovascular history, medications, and testing/treatments to first appointment.  - Continue statin, beta blocker  - May resume prior diet  - Lifestyle modifications discussed to reduce cardiovascular risk factors including weight reduction, smoking cessation (referral provided if applicable), medication compliance, and routine follow up with Cardiologist to track your BMI, cholesterol, and glucose levels.   - Follow-up on 12/23/2024 @ 1 pm with Dr Vazquez for post PCI check  - Follow-up in 2 weeks with outpatient/referring cardiologist  - Remainder of plan per primary team/non-interventional cardiology
- Plan for staged PCI with atherectomy of residual RCA disease at [facility] on Thursday, 12/19/2024.  Will need transfer arranged to hospitalist service in anticipation of staged PCI.    - Post cath/PCI routine VS, access site, neuro-vascular monitoring and RUE post access precautions ordered  - Post cath access site precautions reviewed with pt who verbalized good understanding  - No post cath hydration d/t ESRD on HD  - Bedrest. May get OOB 30 minutes after radial band removed if wrist and hemodynamics remain stable   - F/U labs and EKG in am  - Continue dual anti platelet therapy with aspirin AND clopidogrel.  Discontinue heparin gtt  - Pt education provided/reinforced re: importance of strict adherence to uninterrupted DAPT for minimum of 9-12 months (cardiologist will determine duration)  - Patient educated on benefits of Cardiac Rehab Program; referral provided to patient. Referral faxed and copy placed in medical record. Patient given list of locations with phone numbers of local rehab facilities and advised to contact their insurance company for participating providers. Patient educated on need to bring discharge documents including cardiovascular history, medications, and testing/treatments to first appointment.  - Continue statin, beta blocker  - May resume prior diet  - Lifestyle modifications discussed to reduce cardiovascular risk factors including weight reduction, smoking cessation (referral provided if applicable), medication compliance, and routine follow up with Cardiologist to track your BMI, cholesterol, and glucose levels.   - Follow-up on 12/23/2024 @ 1 pm with Dr Vazquez for post PCI check  - Follow-up in 2 weeks with outpatient/referring cardiologist  - Remainder of plan per primary team/non-interventional cardiology

## 2024-12-18 NOTE — H&P ADULT - CONVERSATION DETAILS
Pt reported that he had previously decided to be DNR/DNI with trial of NIV (also noted on Palliative Care GOC documentation on 12/16/24 at [facility]). However, since then, due to the need for PCI, pt rescinded his DNR/DNI status for 30 days following procedure. Pt confirmed this and stated he thinks he may decide to remain FULL CODE even after the 30 days. 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 Stated his daughter Jn is his HCP and that she is aware of his current code status.

## 2024-12-18 NOTE — H&P ADULT - PROBLEM SELECTOR PLAN 10
Discrepancies noted between list of home meds from OSH and list provided by pt. Unclear if pt's meds were changed at the [facility]  - clarify home meds and further med rec in AM; will continue with meds pt was on at OSH for now

## 2024-12-18 NOTE — PROGRESS NOTE ADULT - SUBJECTIVE AND OBJECTIVE BOX
PROGRESS NOTE -  Patient is a 79y old  Male who presents with a chief complaint of PCI (18 Dec 2024 08:19)  Chart and available morning labs /imaging are reviewed electronically , urgent issues addressed . More information  is being added upon completion of rounds , when more information is collected and management discussed with consultants , medical staff and social service/case management on the floor   OVERNIGHT -  No new issues reported by medical staff . All above noted Patient is resting in a bed comfortably .Seen in HD UNIT , no complains .No distress noted    CHIEF COMPLAINT.   . 12/15/2024 Patient BIBA from [facility], complaining of nausea and vomiting and HTN earlier today, has left arm dialysis (MWF), states has not missed any appointments, 18g right hand, given 4mg of zofran prior  OVERALL PRESENTATION.  . 12/15/2024 79-year-old male with past med history of HTN, HLD, dialysis with left arm fistula, Monday Wednesday Friday dialysis who presents emergency department for chest pain.  This started just as patient was going to sleep associate with nausea vomiting pain rating down his left upper extremity as well as to his jaw and neck.  Patient has never had pain like this before.  Patient is supposed to be taking aspirin, however stopped because he did not want to take it anymore.  Denies history of stents.  Discussed with interventional cardiology recommending heparin, aspirin, Brilinta and repeat EKG   (15 Dec 2024 08:18)  PAST MEDICAL & SURGICAL HISTORY:  ESRD on dialysis      Thyroid cancer      Lung cancer      HLD (hyperlipidemia)      Anxiety and depression      Acquired hypothyroidism      ESRD on dialysis          MEDICATIONS  (STANDING):  aspirin enteric coated 81 milliGRAM(s) Oral every 24 hours  atorvastatin 80 milliGRAM(s) Oral at bedtime  chlorhexidine 2% Cloths 1 Application(s) Topical daily  clopidogrel Tablet 75 milliGRAM(s) Oral every 24 hours  dextrose 5% + sodium chloride 0.45%. 1000 milliLiter(s) (20 mL/Hr) IV Continuous <Continuous>  finasteride 5 milliGRAM(s) Oral daily  heparin   Injectable 5000 Unit(s) SubCutaneous every 8 hours  isosorbide   mononitrate ER Tablet (IMDUR) 30 milliGRAM(s) Oral daily  levothyroxine 175 MICROGram(s) Oral daily  metoprolol tartrate 25 milliGRAM(s) Oral three times a day  multivitamin 1 Tablet(s) Oral daily  pantoprazole    Tablet 40 milliGRAM(s) Oral daily  PARoxetine 40 milliGRAM(s) Oral daily  polyethylene glycol 3350 17 Gram(s) Oral at bedtime  sevelamer carbonate 800 milliGRAM(s) Oral three times a day with meals    MEDICATIONS  (PRN):  acetaminophen     Tablet .. 650 milliGRAM(s) Oral every 6 hours PRN Temp greater or equal to 38C (100.4F), Mild Pain (1 - 3)  acetaminophen   IVPB .. 1000 milliGRAM(s) IV Intermittent once PRN Temp greater or equal to 38C (100.4F), Moderate Pain (4 - 6)  ALPRAZolam 0.25 milliGRAM(s) Oral every 8 hours PRN anxiety  aluminum hydroxide/magnesium hydroxide/simethicone Suspension 30 milliLiter(s) Oral every 4 hours PRN Dyspepsia  bisacodyl Suppository 10 milliGRAM(s) Rectal daily PRN Constipation  melatonin 3 milliGRAM(s) Oral at bedtime PRN Insomnia  morphine  - Injectable 1 milliGRAM(s) IV Push every 2 hours PRN Moderate Pain (4 - 6)  nitroglycerin     SubLingual 0.4 milliGRAM(s) SubLingual every 5 minutes PRN Chest Pain  ondansetron Injectable 4 milliGRAM(s) IV Push every 8 hours PRN Nausea and/or Vomiting      OBJECTIVE    T(C): 37 (12-18-24 @ 16:15), Max: 37 (12-18-24 @ 16:15)  HR: 71 (12-18-24 @ 13:00) (62 - 84)  BP: 131/74 (12-18-24 @ 13:00) (98/71 - 156/59)  RR: 21 (12-18-24 @ 13:00) (14 - 25)  SpO2: 100% (12-18-24 @ 13:00) (95% - 100%)  Wt(kg): --  I&O's Summary    17 Dec 2024 07:01  -  18 Dec 2024 07:00  --------------------------------------------------------  IN: 0 mL / OUT: 100 mL / NET: -100 mL    18 Dec 2024 07:01  -  18 Dec 2024 16:22  --------------------------------------------------------  IN: 0 mL / OUT: 2000 mL / NET: -2000 mL          REVIEW OF SYSTEMS:  CONSTITUTIONAL: No fever, weight loss, or fatigue  EYES: No eye pain, visual disturbances, or discharge  ENMT:   No sinus or throat pain  NECK: No pain or stiffness  RESPIRATORY: No cough, wheezing, chills or hemoptysis; No shortness of breath  CARDIOVASCULAR: No chest pain, palpitations, dizziness, or leg swelling  GASTROINTESTINAL: No abdominal pain. No nausea, vomiting; No diarrhea or constipation. No melena or hematochezia.  GENITOURINARY: No dysuria, frequency, hematuria, or incontinence  NEUROLOGICAL: No headaches, memory loss, loss of strength, numbness, or tremors  SKIN: No itching, burning, rashes, or lesions   MUSCULOSKELETAL: No joint pain or swelling; No muscle, back, or extremity pain    PHYSICAL EXAM:  Appearance: NAD. VS past 24 hrs -as above   HEENT:   Moist oral mucosa. Conjunctiva clear b/l.   Neck : supple  Respiratory: Lungs CTAB.  Gastrointestinal:  Soft, nontender. No rebound. No rigidity. BS present	  Cardiovascular: RRR ,S1S2 present  Neurologic: Non-focal. Moving all extremities.  Extremities: No edema. No erythema. No calf tenderness.  Skin: No rashes, No ecchymoses, No cyanosis.	  wounds ,skin lesions-See skin assesment flow sheet   LABS:                        9.3    9.35  )-----------( 279      ( 18 Dec 2024 05:59 )             28.4     12-18    136  |  98  |  77[H]  ----------------------------<  96  5.1   |  29  |  7.60[H]    Ca    8.8      18 Dec 2024 05:59    TPro  7.1  /  Alb  3.1[L]  /  TBili  0.5  /  DBili  x   /  AST  44[H]  /  ALT  23  /  AlkPhos  76  12-17    CAPILLARY BLOOD GLUCOSE        PT/INR - ( 17 Dec 2024 05:30 )   PT: 15.9 sec;   INR: 1.36 ratio         PTT - ( 17 Dec 2024 05:30 )  PTT:61.7 sec  Urinalysis Basic - ( 18 Dec 2024 05:59 )    Color: x / Appearance: x / SG: x / pH: x  Gluc: 96 mg/dL / Ketone: x  / Bili: x / Urobili: x   Blood: x / Protein: x / Nitrite: x   Leuk Esterase: x / RBC: x / WBC x   Sq Epi: x / Non Sq Epi: x / Bacteria: x        Culture - Blood (collected 16 Dec 2024 13:37)  Source: .Blood BLOOD  Preliminary Report (17 Dec 2024 19:02):    No growth at 24 hours    Culture - Blood (collected 16 Dec 2024 13:30)  Source: .Blood BLOOD  Preliminary Report (17 Dec 2024 19:02):    No growth at 24 hours      RADIOLOGY & ADDITIONAL TESTS:   reviewed elctronically  ASSESSMENT/PLAN: 	    Patient was seen and examined on a day of discharge . Plan of care , discharge medications and recommendations discussed with consultants and clearance for discharge obtained .Social service , case management  and medical staff are aware of plan. Family is notified. Discharge summary  is  prepared electronically-see separate document prepared by me .75minutes spent on this visit, 50% visit time spent in care co-ordination with other attendings and counselling patient  I have discussed care plan with patient and HCP,expressed understanding of problems treatment and their effect and side effects, alternatives in detail,I have asked if they have any questions and concerns and appropriately addressed them to best of my ability Transfer was initiated this morning , transfer center was aware that patient is planned and booked for today for PCI intervention by Dr Proctor .Case d/w accepting MD Dr Gonzalez

## 2024-12-18 NOTE — H&P CARDIOLOGY - NSICDXPASTMEDICALHX_GEN_ALL_CORE_FT
PAST MEDICAL HISTORY:  Acquired hypothyroidism     Anxiety and depression     ESRD on dialysis     ESRD on dialysis     HLD (hyperlipidemia)     Lung cancer     Thyroid cancer

## 2024-12-18 NOTE — H&P ADULT - PROBLEM SELECTOR PLAN 2
Hx of ESRD on HD (MWF). Prior to arrival at [facility], last had HD session at [place] on 12/18/24.  - c/w HD as scheduled by Nephrology  - c/w sevelamer  - pt stated he is on midodrine on HD days and valtessa on non-HD days at home, however was not on these medications at OSH and not noted on OSH med list, clarify with Nephrology in AM  - Nephrology consult in AM (follows with Dr. Vega, [facility] group)

## 2024-12-18 NOTE — PROGRESS NOTE ADULT - SUBJECTIVE AND OBJECTIVE BOX
[facility]                                                       Dr. Lassiter, Dr. Vega, Dr. Galloway, Dr. Piedra, Dr. Mcculolugh, [facility]. [facility], [facility], [address]                                                 [address] [address]                                      Ph: [phone]  Fax: [phone]                                         Ph: [phone]  Fax: [phone]      Patient is a 79y old  Male who presents with a chief complaint of chest pain (15 Dec 2024 05:23)  Patient seen in follow up for ESRD on HD.        PAST MEDICAL HISTORY:  ESRD on dialysis    Thyroid cancer    Lung cancer    HLD (hyperlipidemia)    Anxiety and depression    Acquired hypothyroidism    ESRD on dialysis      MEDICATIONS  (STANDING):  aspirin enteric coated 81 milliGRAM(s) Oral every 24 hours  atorvastatin 80 milliGRAM(s) Oral at bedtime  chlorhexidine 2% Cloths 1 Application(s) Topical daily  clopidogrel Tablet 75 milliGRAM(s) Oral every 24 hours  dextrose 5% + sodium chloride 0.45%. 1000 milliLiter(s) (20 mL/Hr) IV Continuous <Continuous>  finasteride 5 milliGRAM(s) Oral daily  heparin   Injectable 5000 Unit(s) SubCutaneous every 8 hours  isosorbide   mononitrate ER Tablet (IMDUR) 30 milliGRAM(s) Oral daily  levothyroxine 175 MICROGram(s) Oral daily  metoprolol tartrate 25 milliGRAM(s) Oral three times a day  multivitamin 1 Tablet(s) Oral daily  pantoprazole    Tablet 40 milliGRAM(s) Oral daily  PARoxetine 40 milliGRAM(s) Oral daily  polyethylene glycol 3350 17 Gram(s) Oral at bedtime  sevelamer carbonate 800 milliGRAM(s) Oral three times a day with meals    MEDICATIONS  (PRN):  acetaminophen     Tablet .. 650 milliGRAM(s) Oral every 6 hours PRN Temp greater or equal to 38C (100.4F), Mild Pain (1 - 3)  acetaminophen   IVPB .. 1000 milliGRAM(s) IV Intermittent once PRN Temp greater or equal to 38C (100.4F), Moderate Pain (4 - 6)  ALPRAZolam 0.25 milliGRAM(s) Oral every 8 hours PRN anxiety  aluminum hydroxide/magnesium hydroxide/simethicone Suspension 30 milliLiter(s) Oral every 4 hours PRN Dyspepsia  bisacodyl Suppository 10 milliGRAM(s) Rectal daily PRN Constipation  melatonin 3 milliGRAM(s) Oral at bedtime PRN Insomnia  morphine  - Injectable 1 milliGRAM(s) IV Push every 2 hours PRN Moderate Pain (4 - 6)  nitroglycerin     SubLingual 0.4 milliGRAM(s) SubLingual every 5 minutes PRN Chest Pain  ondansetron Injectable 4 milliGRAM(s) IV Push every 8 hours PRN Nausea and/or Vomiting    T(C): 36.7 (12-18-24 @ 12:55), Max: 36.8 (12-17-24 @ 04:26)  HR: 71 (12-18-24 @ 13:00) (61 - 84)  BP: 131/74 (12-18-24 @ 13:00) (98/55 - 204/77)  RR: 21 (12-18-24 @ 13:00)  SpO2: 100% (12-18-24 @ 13:00)  Wt(kg): --  I&O's Detail    17 Dec 2024 07:01  -  18 Dec 2024 07:00  --------------------------------------------------------  IN:  Total IN: 0 mL    OUT:    Voided (mL): 100 mL  Total OUT: 100 mL    Total NET: -100 mL      18 Dec 2024 07:01  -  18 Dec 2024 15:21  --------------------------------------------------------  IN:  Total IN: 0 mL    OUT:    Other (mL): 2000 mL  Total OUT: 2000 mL    Total NET: -2000 mL                  PHYSICAL EXAM:  General: No distress  Respiratory: b/l air entry  Cardiovascular: S1 S2  Gastrointestinal: soft  Extremities:  no edema, left AVF        LABORATORY:                        9.3    9.35  )-----------( 279      ( 18 Dec 2024 05:59 )             28.4     12-18    136  |  98  |  77[H]  ----------------------------<  96  5.1   |  29  |  7.60[H]    Ca    8.8      18 Dec 2024 05:59    TPro  7.1  /  Alb  3.1[L]  /  TBili  0.5  /  DBili  x   /  AST  44[H]  /  ALT  23  /  AlkPhos  76  12-17    Sodium: 136 mmol/L (12-18 @ 05:59)  Sodium: 136 mmol/L (12-17 @ 05:30)    Potassium: 5.1 mmol/L (12-18 @ 05:59)  Potassium: 4.3 mmol/L (12-17 @ 05:30)    Hemoglobin: 9.3 g/dL (12-18 @ 05:59)  Hemoglobin: 9.4 g/dL (12-17 @ 05:30)  Hemoglobin: 9.3 g/dL (12-16 @ 22:15)  Hemoglobin: 10.0 g/dL (12-16 @ 14:21)    Creatinine, Serum 7.60 (12-18 @ 05:59)  Creatinine, Serum 5.60 (12-17 @ 05:30)  Creatinine, Serum 3.90 (12-16 @ 13:30)  Creatinine, Serum 7.40 (12-16 @ 09:30)        LIVER FUNCTIONS - ( 17 Dec 2024 05:30 )  Alb: 3.1 g/dL / Pro: 7.1 g/dL / ALK PHOS: 76 U/L / ALT: 23 U/L / AST: 44 U/L / GGT: x           Urinalysis Basic - ( 18 Dec 2024 05:59 )    Color: x / Appearance: x / SG: x / pH: x  Gluc: 96 mg/dL / Ketone: x  / Bili: x / Urobili: x   Blood: x / Protein: x / Nitrite: x   Leuk Esterase: x / RBC: x / WBC x   Sq Epi: x / Non Sq Epi: x / Bacteria: x

## 2024-12-18 NOTE — PROGRESS NOTE ADULT - ASSESSMENT
ESRD on HD MWF at [place] [facility] (Dr Vega)  Hyperkalemia  Hyperphosphatemia  Chest pain    12/16/24: HD today. Low potassium bath. Dietary compliance advised. Trend BP and titrate BP meds as needed. Phos binders.   Add lokelma on non dialysis days on discharge. Cardiology follow up.   12/17/24: Cardiac cath today. Next HD tomorrow. Low potassium diet. Cardiology follow up.   12/18/34: HD today. Fluid removal as tolerated by BP. For transfer to [facility] for cardiac cath (staged PCI with atherectomy of residual RCA disease).

## 2024-12-18 NOTE — PROGRESS NOTE ADULT - ASSESSMENT
Assessment and Plan:   · Assessment	  80 y/o male with HTN, dyslipidemia, and ESRD on HD via L AVF on M, W, F, who is admitted with NSTEMI.  LVEF is 59% on TTE.  Cardiac catheterization 12/17/2024 revealed dLCX 80% and p/mRCA 95%.  HE was treated with a successful PCI/BOB x1.      Plan:   - Pt is already in-patient, observed overnight in CPU  - Plan for staged PCI with atherectomy of residual RCA disease at [facility] on Thursday, 12/19/2024.  Will need transfer arranged to hospitalist service in anticipation of staged PCI.    - Post cath/PCI routine VS, access site, neuro-vascular monitoring and RUE post access precautions ordered  - Post cath access site precautions reviewed with pt who verbalized good understanding  - No post cath hydration d/t ESRD on HD  -  OOB as tolerated this am with assist.  - F/U labs and EKG reviewed this am  - Continue dual anti platelet therapy with aspirin AND clopidogrel.   - Pt education provided/reinforced re: importance of strict adherence to uninterrupted DAPT for minimum of 9-12 months (cardiologist will determine duration)  - Patient educated on benefits of Cardiac Rehab Program; referral provided to patient. Referral faxed and copy placed in medical record. Patient given list of locations with phone numbers of local rehab facilities and advised to contact their insurance company for participating providers. Patient educated on need to bring discharge documents including cardiovascular history, medications, and testing/treatments to first appointment.  - Continue statin, beta blocker   - Lifestyle modifications discussed to reduce cardiovascular risk factors including weight reduction, smoking cessation (referral provided if applicable), medication compliance, and routine follow up with Cardiologist to track your BMI, cholesterol, and glucose levels.   - Follow-up on 12/23/2024 @ 1 pm with Dr Vazquez for post PCI check  - Follow-up in 2 weeks with outpatient/referring cardiologist  - Remainder of plan per primary team/non-interventional cardiology

## 2024-12-18 NOTE — DISCHARGE NOTE NURSING/CASE MANAGEMENT/SOCIAL WORK - FINANCIAL ASSISTANCE
[facility] provides services at a reduced cost to those who are determined to be eligible through [facility]’s financial assistance program. Information regarding [facility]’s financial assistance program can be found by going to [email or web address] or by calling [phone].

## 2024-12-18 NOTE — PROGRESS NOTE ADULT - SUBJECTIVE AND OBJECTIVE BOX
Department of Cardiology                                                                     Division of Interventional Cardiology                                                                  [facility]/ [address] [address]                                                                             Telephone: [phone]                                                    Post- Procedure Note: s/p Left Heart Cardiac catheterization 12/17/2024 revealed dLCX 80% and (p/mRCA 95%.) with a successful PCI/BOB x1 via RRA by Dr Elier Vazquez   - Plan for staged PCI with atherectomy of residual RCA disease at [facility] on Thursday, 12/19/2024 with Dr Elier Vazquez .  Will need transfer arranged to hospitalist service in anticipation of staged PCI.    Having HD today. Pt seen and examined this am-no overnight events-denies any c/o.Aware & agreeable to the plan of care.    Subjective/ROS:  Denies CP, palpitations, SOB, N/V, fever/chills, dizziness, weakness, numbness/tingling.      HPI:     CHIEF COMPLAINT.   . 12/15/2024 Patient BIBA from [facility], complaining of nausea and vomiting and HTN earlier today, has left arm dialysis (MWF), states has not missed any appointments, 18g right hand, given 4mg of zofran prior    OVERALL PRESENTATION.  . 12/15/2024 79-year-old male with past med history of HTN, HLD, dialysis with left arm fistula, Monday Wednesday Friday dialysis who presents emergency department for chest pain.  This started just as patient was going to sleep associate with nausea vomiting pain rating down his left upper extremity as well as to his jaw and neck.  Patient has never had pain like this before.  Patient is supposed to be taking aspirin, however stopped because he did not want to take it anymore.  Denies history of stents.  Discussed with interventional cardiology recommending heparin, aspirin, Brilinta and repeat EKG   (15 Dec 2024 08:18)      PAST MEDICAL & SURGICAL HISTORY:  ESRD on dialysis      Thyroid cancer      Lung cancer      HLD (hyperlipidemia)      Anxiety and depression      Acquired hypothyroidism      ESRD on dialysis          MEDICATIONS  (STANDING):  aspirin enteric coated 81 milliGRAM(s) Oral every 24 hours  atorvastatin 80 milliGRAM(s) Oral at bedtime  chlorhexidine 2% Cloths 1 Application(s) Topical daily  clopidogrel Tablet 75 milliGRAM(s) Oral every 24 hours  dextrose 5% + sodium chloride 0.45%. 1000 milliLiter(s) (20 mL/Hr) IV Continuous <Continuous>  finasteride 5 milliGRAM(s) Oral daily  heparin   Injectable 5000 Unit(s) SubCutaneous every 8 hours  isosorbide   mononitrate ER Tablet (IMDUR) 30 milliGRAM(s) Oral daily  levothyroxine 175 MICROGram(s) Oral daily  metoprolol tartrate 25 milliGRAM(s) Oral three times a day  multivitamin 1 Tablet(s) Oral daily  pantoprazole    Tablet 40 milliGRAM(s) Oral daily  PARoxetine 40 milliGRAM(s) Oral daily  polyethylene glycol 3350 17 Gram(s) Oral at bedtime  sevelamer carbonate 800 milliGRAM(s) Oral three times a day with meals    MEDICATIONS  (PRN):  acetaminophen     Tablet .. 650 milliGRAM(s) Oral every 6 hours PRN Temp greater or equal to 38C (100.4F), Mild Pain (1 - 3)  acetaminophen   IVPB .. 1000 milliGRAM(s) IV Intermittent once PRN Temp greater or equal to 38C (100.4F), Moderate Pain (4 - 6)  ALPRAZolam 0.25 milliGRAM(s) Oral every 8 hours PRN anxiety  aluminum hydroxide/magnesium hydroxide/simethicone Suspension 30 milliLiter(s) Oral every 4 hours PRN Dyspepsia  bisacodyl Suppository 10 milliGRAM(s) Rectal daily PRN Constipation  melatonin 3 milliGRAM(s) Oral at bedtime PRN Insomnia  morphine  - Injectable 1 milliGRAM(s) IV Push every 2 hours PRN Moderate Pain (4 - 6)  nitroglycerin     SubLingual 0.4 milliGRAM(s) SubLingual every 5 minutes PRN Chest Pain  ondansetron Injectable 4 milliGRAM(s) IV Push every 8 hours PRN Nausea and/or Vomiting    Allergies: No Known Allergies                            9.3    9.35  )-----------( 279      ( 18 Dec 2024 05:59 )             28.4     12-18    136  |  98  |  77[H]  ----------------------------<  96  5.1   |  29  |  7.60[H]    Ca    8.8      18 Dec 2024 05:59    TPro  7.1  /  Alb  3.1[L]  /  TBili  0.5  /  DBili  x   /  AST  44[H]  /  ALT  23  /  AlkPhos  76  12-17    PT/INR - ( 17 Dec 2024 05:30 )   PT: 15.9 sec;   INR: 1.36 ratio         PTT - ( 17 Dec 2024 05:30 )  PTT:61.7 sec      Vital Signs Last 24 Hrs  T(C): 36.7 (18 Dec 2024 07:54), Max: 36.7 (17 Dec 2024 23:52)  T(F): 98 (18 Dec 2024 07:54), Max: 98.1 (17 Dec 2024 23:52)  HR: 77 (18 Dec 2024 06:00) (61 - 79)  BP: 147/53 (18 Dec 2024 04:00) (103/52 - 204/77)  BP(mean): 73 (18 Dec 2024 04:00) (56 - 101)  RR: 19 (18 Dec 2024 06:00) (12 - 25)  SpO2: 97% (18 Dec 2024 06:00) (89% - 99%)    Parameters below as of 17 Dec 2024 19:00  Patient On (Oxygen Delivery Method): nasal cannula  O2 Flow (L/min): 2        Post PCI ECG:     Ventricular Rate 63 BPM    Atrial Rate 63 BPM    P-R Interval 180 ms    QRS Duration 94 ms    Q-T Interval 474 ms    QTC Calculation(Bazett) 485 ms    P Axis 46 degrees    R Axis -9 degrees    T Axis 235 degrees    Diagnosis Line Normal sinus rhythm  Left ventricular hypertrophy with repolarization abnormality ( R in aVL , Michael product )  Inferior infarct , age undetermined  Abnormal ECG    Confirmed by Madyson Rahman ([id number]) on 12/17/2024 1:55:38 PM    Constitutional: NAD  Neuro: A+O x 3, non-focal, speech clear and intact  HEENT: NC/AT, PERRL, EOMI, anicteric sclerae, oral mucosa pink and moist  Neck: supple, no JVD  CV: regular rate, regular rhythm, +S1S2, no murmurs or rub  Pulm/chest: lung sounds CTA and equal bilaterally, no accessory muscle use noted  Abd: soft, NT, ND, +BS  Ext: NGUYEN x 4, no C/C/E  Access site: R wrist C/D/I/soft without hematoma, distal motor/neuro/circ intact. R radial pulse 2+. Tegaderm drsg removed.  Skin: warm, well perfused  Psych: calm, appropriate affect

## 2024-12-18 NOTE — PATIENT PROFILE ADULT - NSPROHMSYMPCOND_GEN_A_NUR
Procedure  POC Pelvic US    Date/Time: 3/31/2023 4:18 PM  Performed by: Codie Chance DO  Authorized by: Codie Chance DO     Patient location:  ED  Other Assisting Provider: Yes (comment) Chary Ackerman    Procedure details:     Exam Type:  Diagnostic    Indications: evaluate for IUP, pregnant with abdominal pain and pregnant with vaginal bleeding      Assessment for: confirm intrauterine pregnancy and evaluate fetal viability      Technique:  Transabdominal obstetric (HCG+) exam    Views obtained: uterus (transverse and sagittal) and pouch of Pradeep      Image quality: diagnostic      Image availability:  Images available in PACS  Uterine findings:     Single gestation: identified      Gestational sac: identified      Yolk sac: identified      Fetal pole: identified      Fetal heart rate: identified      Fetal heart rate (bpm):  160  Right adnexa findings:     Right ovary:  Not visualized  Left adnexa findings:     Left ovary:  Not visualized  Other findings:     Free pelvic fluid: not identified      Free peritoneal fluid: not identified    Interpretation:     Ultrasound impressions: abnormal      Pregnancy findings: intrauterine pregnancy (IUP)    Comments:      Fluid between the gestational sac and endometrium                       Codie Chance DO  03/31/23 8888 cardiovascular/hematologic/musculoskeletal

## 2024-12-18 NOTE — PROGRESS NOTE ADULT - PROBLEM SELECTOR PLAN 10
Gastrointestinal stress ulcer prophylaxis and DVT prophylaxis administered
continue home medications

## 2024-12-18 NOTE — H&P ADULT - PROBLEM SELECTOR PLAN 1
Found to have NSTEMI at [place]  - EKG (12/18/24 from [facility]): NSR, HR 76, QTc 465, biphasic/TWI in inferolateral leads (seems slightly improved compared to initial presenting EKG)  - TTE (12/15/24): LVEF 59%, +rWMAs, diastolic function indeterminate, LA mildly dilated, trace valvular regurgitations  - s/p LHC (12/17/24 at [place]) showing 90% pRCA, 95% mRCA, 80% dLCx, now DESx1 to dLCx  - pending staged PCI with rotational atherectomy of RCA on 12/19/24 at [facility]  - c/w ASA, Plavix, atorvastatin, metoprolol and imdur for now  - hsTropT >10k on arrival at [facility], no active CP  - f/u EKG  - monitor telemetry  - f/u further Cardiology recs in AM - TTE (12/15/24): LVEF 59%, +rWMAs, diastolic function indeterminate, LA mildly dilated, trace valvular regurgitations  - s/p LHC (12/17/24 at [facility]) showing 90% pRCA, 95% mRCA, 80% dLCx, now DESx1 to dLCx  - pending staged PCI with rotational atherectomy of RCA on 12/19/24 at [facility]  - EKG (12/18/24 from [facility]): NSR, HR 76, QTc 465, biphasic/TWI in inferolateral leads (seems slightly improved compared to initial presenting EKG)  - f/u repeat EKG; hsTropT >10k on arrival at [facility], but no active CP  - c/w ASA, Plavix, atorvastatin, metoprolol and imdur for now  - monitor on telemetry  - f/u further Cardiology recs in AM

## 2024-12-18 NOTE — PROGRESS NOTE ADULT - PROBLEM SELECTOR PLAN 6
continue home medications
Admit to monitored unit for cardiac monitoring, obtain echo to evaluate LVEF, prn  diuresis as per card consult , monitor ins/outs, monitor renal profile and electrolytes closely ,send 3 sets of enzymes, O2 supply, serial chest xrays, monitor weights and oral intake of fluids, nutritionist consult

## 2024-12-18 NOTE — H&P ADULT - NSHPPHYSICALEXAM_GEN_ALL_CORE
Vital Signs Last 24 Hrs  T(C): 37.2 (19 Dec 2024 00:00), Max: 37.2 (18 Dec 2024 21:15)  T(F): 99 (19 Dec 2024 00:00), Max: 99 (19 Dec 2024 00:00)  HR: 71 (19 Dec 2024 00:00) (62 - 84)  BP: 146/65 (19 Dec 2024 00:00) (98/71 - 156/59)  BP(mean): 97 (18 Dec 2024 21:15) (69 - 97)  RR: 17 (19 Dec 2024 00:00) (14 - 25)  SpO2: 93% (19 Dec 2024 00:00) (93% - 100%)    Parameters below as of 19 Dec 2024 00:00  Patient On (Oxygen Delivery Method): nasal cannula  O2 Flow (L/min): 2      CONSTITUTIONAL: NAD, well-developed, well-groomed  EYES: PERRL; conjunctiva and sclera clear  ENMT: Moist oral mucosa  NECK: Supple  RESPIRATORY: Normal respiratory effort; lungs are clear to auscultation bilaterally; No wheezes or rales  CARDIOVASCULAR: Regular rate and rhythm; Normal S1 and S2; + murmur, No rubs or gallops; No lower extremity edema; Peripheral pulses are palpable bilaterally  ABDOMEN: Soft, Nontender, Nondistended; Bowel sounds present  MUSCULOSKELETAL:  No clubbing or cyanosis of digits; No joint swelling or tenderness to palpation  PSYCH: AAOx3 (oriented to person, place, and time); affect appropriate  NEUROLOGY: moving all extremities spontaneously; no gross sensory deficits  SKIN: No rashes; No palpable lesions

## 2024-12-18 NOTE — PROGRESS NOTE ADULT - PROVIDER SPECIALTY LIST ADULT
Cardiology
Hospitalist
Intervent Cardiology
Nephrology
Infectious Disease
Nephrology
Pulmonology
Cardiology
Nephrology
Intervent Cardiology
Hospitalist
Hospitalist

## 2024-12-18 NOTE — H&P ADULT - TIME BILLING
reviewing prior documentation, independently obtaining a history, performing a physical examination, reviewing and independently interpreting tests/imaging, discussing the plan with the patient, counseling and educating the patient/family member(s), ordering medications/tests, documenting clinical information in the electronic health record, and coordinating care with [facility] team. reviewing prior documentation/OSH records, independently obtaining a history, performing a physical examination, reviewing and independently interpreting tests/imaging, discussing the plan with the patient, counseling and educating the patient/family member(s), ordering medications/tests, documenting clinical information in the electronic health record, and coordinating care with [facility] team. reviewing prior documentation/OSH records, independently obtaining a history, performing a physical examination, reviewing and independently interpreting tests/imaging, discussing the plan with the patient, counseling and educating the patient/family member(s), ordering medications/tests, documenting clinical information in the electronic health record, and coordinating care with [facility] team. Additional time spent separately on advance care planning as noted above.

## 2024-12-18 NOTE — DISCHARGE NOTE NURSING/CASE MANAGEMENT/SOCIAL WORK - PATIENT PORTAL LINK FT
You can access the FollowMyHealth Patient Portal offered by [facility] by registering at the following website: [email or web address]. By joining [email or web address]s FollowMyHealth portal, you will also be able to view your health information using other applications (apps) compatible with our system.

## 2024-12-18 NOTE — PATIENT PROFILE ADULT - DEAF OR HARD OF HEARING?
[facility] Diabetes Education Progress Note    Reason for Visit: Follow up Diabetes Education.      Highlights of today's visit: Medicare initial 10 hours.    Pleasant 62 y/o Male patient present for initial diabetes education visit. Today wanted to start checking blood sugars. Per pt, he believed the meter he previously used was an accu chek meter. Writer provided a sample meter for pt to start checking. Reviewed meter teach and provided sample meter and 10 strips and lancets. Most recent A1c of 7.2% today in clinic. FBG today of 102 in clinic. Pt has made significant changes to diet since last seeing writer for nutrition. At follow up visit writer will review blood sugars with pt. If pt has any problems he will contact writer.      Current Medication and Medication Update/Changes:  Metformin 1000 mg BID  Januvia 100 mg     Assessment of Blood Sugars:   No meter strips. Pt has not checked blood sugar in a while. Pt is interested in checking.    The patient was seen for Diabetes education in an individual setting for diagnosis of Controlled type 2 diabetes mellitus without complication, without long-term current use of insulin .  The patient was seen from  1300 to 1330 and not billed as visit was spent calling PCP for referral .     Material was presented using verbal, written, demonstration and return demonstration.    Learning needs were assessed at initial visit and the patient requires education in medical nutrition therapy, physical activity and goal setting.     Labs:  Hemoglobin A1C: target value and patient current value reviewed   No results found for: \"RQTMSDUH8O\"  No results found for: \"HGBA1C\"    Lipid panel:  target value and patient current value reviewed   CHOLESTEROL (mg/dL)   Date Value   12/06/2021 122     CALCULATED LDL (mg/dL)   Date Value   12/06/2021 67     HDL (mg/dL)   Date Value   12/06/2021 43     TRIGLYCERIDE (mg/dL)   Date Value   12/06/2021 52       Microalbumin:  target value and patient  current value reviewed No results found for: \"MAR\"    Creatinine:  target value and patient current value reviewed   Creatinine (mg/dL)   Date Value   02/09/2022 0.97         Anthropometric Measurements:  Estimated body mass index is 34.31 kg/m² as calculated from the following:    Height as of 4/1/22: 6' (1.829 m).    Weight as of 5/5/23: 114.8 kg (253 lb).   Wt Readings from Last 2 Encounters:   05/05/23 114.8 kg (253 lb)   04/21/23 114.8 kg (253 lb)     Discussed importance of weight loss with patient for diabetes management    Physical Activity - Incorporating Activity into Lifestyle :  Now walking daily everyday for about 15-30 minutes    Food and Nutrition Related History:  Type of food/meals: Home cooked easy to prep  Meal/Snack pattern: 2 meals  Oral Fluids: Water  Alcohol: None   09/08/23- Now having more vegetables daily.  Breakfast    Sometimes fasts breakfast    Late Brunch  2 eggs+ sausage+ Toast    Whole wheat bread  1 cup of coffee+ alternative sweetneers    Or Ham    Finch with 2 toast    Hasbrown   Lunch   Dinner        Baked chicken  Mash potatoes 1 cup  Green  Linda greens    Roast       Snack       Snack    Plum pear or apple  oranges    Bananas  Grapes  Watermelon  Apples    Fruit smoothies/ with juice strawberries+ ice     Snack           Nutrition Assessment:  Carbohydrate is fairly well controlled per meal  Skipped meals/snacks  Patient is able to use food labels to assist with food choices     Nutrition Diagnosis:  Inconsistent carbohydrate intake related to knowledge deficit of effect food choices have on blood glucose levels as evidenced by difficulty identifying foods that raise blood glucose and foods with neutral effect.  Overweight/Obesity related to history of excessive calorie intake and physical inactivity as evidenced by BMI..    Nutrition Prescription:  NUTRITION PRESCRIPTION:  Meal plan with  45-60 grams of carbohydrates per meal  Plate Method    Nutrition Intervention:   Aim  for 45-60g of carb at meals  Comprehensive Nutrition Education as shown in care plan.    Nutrition counseling based on Cognitive behavioral theory using self-monitoring and Cognitive restructuring to Focus on Nutrition prescription and Increase physical activity.    Nutrition Monitoring/Evaluation:   Verbalized readiness to change nutrition related behaviors - focus on limiting intake per nutrition prescription and Focus on Nutrition prescription and Increase physical activity.    Plan to evaluate self-management as agreed upon to limit intake per nutrition prescription through self-reported adherence score of 75% start checking blood sugars 1x per day .  Patient demonstrated Basic level of knowledge in above areas.    Print/Written Resources Provided:   After Visit Summary (AVS)  Planning Healthy Meals    Plan:  Chart routed to referring Provider.  Goal Setting to Promote Health & Problem Solving for Daily Living was discussed.  See DM Care Plan for goals and topics covered.  See Patient Instructions for further information.     Order:  DSMT Order Expires: 3/20/24  Order located in EMR.  Billing Provider Paul Jarrell MD  Referring Provider: SONIA Babin  Service Provider: Valerie Loomis RD, CD     Recommended Follow-up:  Pt to follow up with DM education in a one on one visit.  The patient was encouraged to call back with any questions or concerns.    Assessments :  Verify assessment form is up to date: current and reviewed with patient 4/21/    .   no

## 2024-12-18 NOTE — PROGRESS NOTE ADULT - SUBJECTIVE AND OBJECTIVE BOX
[facility] [place]       HPI:     CHIEF COMPLAINT.   . 12/15/2024 Patient BIBA from [facility], complaining of nausea and vomiting and HTN earlier today, has left arm dialysis (MWF), states has not missed any appointments, 18g right hand, given 4mg of zofran prior    OVERALL PRESENTATION.  . 12/15/2024 79-year-old male with past med history of HTN, HLD, dialysis with left arm fistula, Monday Wednesday Friday dialysis who presents emergency department for chest pain.  This started just as patient was going to sleep associate with nausea vomiting pain rating down his left upper extremity as well as to his jaw and neck.  Patient has never had pain like this before.  Patient is supposed to be taking aspirin, however stopped because he did not want to take it anymore.  Denies history of stents.  Discussed with interventional cardiology recommending heparin, aspirin, Brilinta and repeat EKG   (15 Dec 2024 08:18)        SUBJECTIVE:      ALLERGIES:  Allergies    No Known Allergies    Intolerances          MEDICATIONS  (STANDING):  aspirin enteric coated 81 milliGRAM(s) Oral every 24 hours  atorvastatin 80 milliGRAM(s) Oral at bedtime  chlorhexidine 2% Cloths 1 Application(s) Topical daily  clopidogrel Tablet 75 milliGRAM(s) Oral every 24 hours  dextrose 5% + sodium chloride 0.45%. 1000 milliLiter(s) (20 mL/Hr) IV Continuous <Continuous>  finasteride 5 milliGRAM(s) Oral daily  heparin   Injectable 5000 Unit(s) SubCutaneous every 8 hours  isosorbide   mononitrate ER Tablet (IMDUR) 30 milliGRAM(s) Oral daily  levothyroxine 175 MICROGram(s) Oral daily  metoprolol tartrate 25 milliGRAM(s) Oral three times a day  multivitamin 1 Tablet(s) Oral daily  pantoprazole    Tablet 40 milliGRAM(s) Oral daily  PARoxetine 40 milliGRAM(s) Oral daily  polyethylene glycol 3350 17 Gram(s) Oral at bedtime  sevelamer carbonate 800 milliGRAM(s) Oral three times a day with meals    MEDICATIONS  (PRN):  acetaminophen     Tablet .. 650 milliGRAM(s) Oral every 6 hours PRN Temp greater or equal to 38C (100.4F), Mild Pain (1 - 3)  acetaminophen   IVPB .. 1000 milliGRAM(s) IV Intermittent once PRN Temp greater or equal to 38C (100.4F), Moderate Pain (4 - 6)  ALPRAZolam 0.25 milliGRAM(s) Oral every 8 hours PRN anxiety  aluminum hydroxide/magnesium hydroxide/simethicone Suspension 30 milliLiter(s) Oral every 4 hours PRN Dyspepsia  bisacodyl Suppository 10 milliGRAM(s) Rectal daily PRN Constipation  melatonin 3 milliGRAM(s) Oral at bedtime PRN Insomnia  morphine  - Injectable 1 milliGRAM(s) IV Push every 2 hours PRN Moderate Pain (4 - 6)  nitroglycerin     SubLingual 0.4 milliGRAM(s) SubLingual every 5 minutes PRN Chest Pain  ondansetron Injectable 4 milliGRAM(s) IV Push every 8 hours PRN Nausea and/or Vomiting      REVIEW OF SYSTEMS:  CONSTITUTIONAL: No fever,  RESPIRATORY: No cough, wheezing, shortness of breath  CARDIOVASCULAR: No chest pain, dyspnea, palpitations, dizziness, syncope, paroxysmal nocturnal dyspnea, orthopnea, or arm or leg swelling  GASTROINTESTINAL: No abdominal  or epigastric pain, nausea, vomiting,  diarrhea  NEUROLOGICAL: No headaches,  loss of strength, numbness, or tremors    Vital Signs Last 24 Hrs  T(C): 36.4 (19 Dec 2024 04:37), Max: 37.2 (18 Dec 2024 21:15)  T(F): 97.5 (19 Dec 2024 04:37), Max: 99 (19 Dec 2024 00:00)  HR: 82 (19 Dec 2024 04:37) (62 - 84)  BP: 162/72 (19 Dec 2024 04:37) (98/71 - 162/72)  BP(mean): 97 (18 Dec 2024 21:15) (69 - 97)  RR: 17 (19 Dec 2024 04:37) (17 - 25)  SpO2: 93% (19 Dec 2024 04:37) (93% - 100%)    Parameters below as of 18 Dec 2024 12:55  Patient On (Oxygen Delivery Method): nasal cannula  O2 Flow (L/min): 2      PHYSICAL EXAM:  HEAD:  Atraumatic, Normocephalic  NECK: Supple and normal thyroid.  No JVD or carotid bruit.   HEART: S1, S2 regular , 1/6 soft ejection systolic murmur in mitral area , no thrill and no gallops .  PULMONARY: Bilateral vesicular breathing , few scattered ronchi and few scattered rales are present .  ABDOMEN: Soft nontender and positive bowl sounds   EXTREMITIES:  No clubbing, cyanosis, or pedal  edema  NEUROLOGICAL: AAOX3 , no focal deficit .    LABS:                        9.3    8.91  )-----------( 320      ( 19 Dec 2024 00:41 )             29.4     12-19    138  |  96  |  43[H]  ----------------------------<  98  4.5   |  25  |  4.85[H]    Ca    9.6      19 Dec 2024 00:41  Phos  4.3     12-19  Mg     2.4     12-19    TPro  7.1  /  Alb  3.1[L]  /  TBili  0.5  /  DBili  x   /  AST  44[H]  /  ALT  23  /  AlkPhos  76  12-17        PT/INR - ( 19 Dec 2024 00:41 )   PT: 13.6 sec;   INR: 1.20 ratio         PTT - ( 19 Dec 2024 00:41 )  PTT:30.8 sec  Urinalysis Basic - ( 19 Dec 2024 00:41 )    Color: x / Appearance: x / SG: x / pH: x  Gluc: 98 mg/dL / Ketone: x  / Bili: x / Urobili: x   Blood: x / Protein: x / Nitrite: x   Leuk Esterase: x / RBC: x / WBC x   Sq Epi: x / Non Sq Epi: x / Bacteria: x      BNP      EKG:  ECHO:  IMAGING:    Assessment/Plan    Will continue to follow during hospital course and give further recommendations as needed . Thanks for your referral . if any questions please contact me at office ([phone])cell [phone])  MARIA ALEJANDRA DUNN MD [place] [id number]  [address]  OFFICE : [phone]  CELL : [phone]  CARDIOLOGY F/U :        HPI:     CHIEF COMPLAINT.   . 12/15/2024 Patient BIBA from [facility], complaining of nausea and vomiting and HTN earlier today, has left arm dialysis (MWF), states has not missed any appointments, 18g right hand, given 4mg of zofran prior    OVERALL PRESENTATION.  . 12/15/2024 79-year-old male with past med history of HTN, HLD, dialysis with left arm fistula, Monday Wednesday Friday dialysis who presents emergency department for chest pain.  This started just as patient was going to sleep associate with nausea vomiting pain rating down his left upper extremity as well as to his jaw and neck.  Patient has never had pain like this before.  Patient is supposed to be taking aspirin, however stopped because he did not want to take it anymore.  Denies history of stents.  Discussed with interventional cardiology recommending heparin, aspirin, Brilinta and repeat EKG   (15 Dec 2024 08:18)        SUBJECTIVE: Patient feeling better .      ALLERGIES:  Allergies    No Known Allergies    Intolerances          MEDICATIONS  (STANDING):  aspirin enteric coated 81 milliGRAM(s) Oral every 24 hours  atorvastatin 80 milliGRAM(s) Oral at bedtime  chlorhexidine 2% Cloths 1 Application(s) Topical daily  clopidogrel Tablet 75 milliGRAM(s) Oral every 24 hours  dextrose 5% + sodium chloride 0.45%. 1000 milliLiter(s) (20 mL/Hr) IV Continuous <Continuous>  finasteride 5 milliGRAM(s) Oral daily  heparin   Injectable 5000 Unit(s) SubCutaneous every 8 hours  isosorbide   mononitrate ER Tablet (IMDUR) 30 milliGRAM(s) Oral daily  levothyroxine 175 MICROGram(s) Oral daily  metoprolol tartrate 25 milliGRAM(s) Oral three times a day  multivitamin 1 Tablet(s) Oral daily  pantoprazole    Tablet 40 milliGRAM(s) Oral daily  PARoxetine 40 milliGRAM(s) Oral daily  polyethylene glycol 3350 17 Gram(s) Oral at bedtime  sevelamer carbonate 800 milliGRAM(s) Oral three times a day with meals    MEDICATIONS  (PRN):  acetaminophen     Tablet .. 650 milliGRAM(s) Oral every 6 hours PRN Temp greater or equal to 38C (100.4F), Mild Pain (1 - 3)  acetaminophen   IVPB .. 1000 milliGRAM(s) IV Intermittent once PRN Temp greater or equal to 38C (100.4F), Moderate Pain (4 - 6)  ALPRAZolam 0.25 milliGRAM(s) Oral every 8 hours PRN anxiety  aluminum hydroxide/magnesium hydroxide/simethicone Suspension 30 milliLiter(s) Oral every 4 hours PRN Dyspepsia  bisacodyl Suppository 10 milliGRAM(s) Rectal daily PRN Constipation  melatonin 3 milliGRAM(s) Oral at bedtime PRN Insomnia  morphine  - Injectable 1 milliGRAM(s) IV Push every 2 hours PRN Moderate Pain (4 - 6)  nitroglycerin     SubLingual 0.4 milliGRAM(s) SubLingual every 5 minutes PRN Chest Pain  ondansetron Injectable 4 milliGRAM(s) IV Push every 8 hours PRN Nausea and/or Vomiting      REVIEW OF SYSTEMS:  CONSTITUTIONAL: No fever,  RESPIRATORY: No cough, wheezing, shortness of breath  CARDIOVASCULAR: No chest pain, dyspnea, palpitations, dizziness, syncope, paroxysmal nocturnal dyspnea, orthopnea, or arm or leg swelling  GASTROINTESTINAL: No abdominal  or epigastric pain, nausea, vomiting,  diarrhea  NEUROLOGICAL: No headaches,  loss of strength, numbness, or tremors    Vital Signs Last 24 Hrs  T(C): 36.4 (19 Dec 2024 04:37), Max: 37.2 (18 Dec 2024 21:15)  T(F): 97.5 (19 Dec 2024 04:37), Max: 99 (19 Dec 2024 00:00)  HR: 82 (19 Dec 2024 04:37) (62 - 84)  BP: 162/72 (19 Dec 2024 04:37) (98/71 - 162/72)  BP(mean): 97 (18 Dec 2024 21:15) (69 - 97)  RR: 17 (19 Dec 2024 04:37) (17 - 25)  SpO2: 93% (19 Dec 2024 04:37) (93% - 100%)    Parameters below as of 18 Dec 2024 12:55  Patient On (Oxygen Delivery Method): nasal cannula  O2 Flow (L/min): 2      PHYSICAL EXAM:  HEAD:  Atraumatic, Normocephalic  NECK: Supple and normal thyroid.  No JVD or carotid bruit.   HEART: S1, S2 regular , 1/6 soft ejection systolic murmur in mitral area , no thrill and no gallops .  PULMONARY: Bilateral vesicular breathing , few scattered ronchi and few scattered rales are present .  ABDOMEN: Soft nontender and positive bowl sounds   EXTREMITIES:  No clubbing, cyanosis, or pedal  edema  NEUROLOGICAL: AAOX3 , no focal deficit .    LABS:                        9.3    8.91  )-----------( 320      ( 19 Dec 2024 00:41 )             29.4     12-19    138  |  96  |  43[H]  ----------------------------<  98  4.5   |  25  |  4.85[H]    Ca    9.6      19 Dec 2024 00:41  Phos  4.3     12-19  Mg     2.4     12-19    TPro  7.1  /  Alb  3.1[L]  /  TBili  0.5  /  DBili  x   /  AST  44[H]  /  ALT  23  /  AlkPhos  76  12-17        PT/INR - ( 19 Dec 2024 00:41 )   PT: 13.6 sec;   INR: 1.20 ratio         PTT - ( 19 Dec 2024 00:41 )  PTT:30.8 sec  Urinalysis Basic - ( 19 Dec 2024 00:41 )    Color: x / Appearance: x / SG: x / pH: x  Gluc: 98 mg/dL / Ketone: x  / Bili: x / Urobili: x   Blood: x / Protein: x / Nitrite: x   Leuk Esterase: x / RBC: x / WBC x   Sq Epi: x / Non Sq Epi: x / Bacteria: x      BNP      EKG:  ECHO:  IMAGING:    Assessment/Plan    Will continue to follow during hospital course and give further recommendations as needed . Thanks for your referral . if any questions please contact me at office ([phone])cell [phone])  MARIA ALEJANDRA DUNN MD [place] [id number]  [address]  OFFICE : [phone]  CELL : [phone]  CARDIOLOGY F/U :        HPI:     CHIEF COMPLAINT.   . 12/15/2024 Patient BIBA from [facility], complaining of nausea and vomiting and HTN earlier today, has left arm dialysis (MWF), states has not missed any appointments, 18g right hand, given 4mg of zofran prior    OVERALL PRESENTATION.  . 12/15/2024 79-year-old male with past med history of HTN, HLD, dialysis with left arm fistula, Monday Wednesday Friday dialysis who presents emergency department for chest pain.  This started just as patient was going to sleep associate with nausea vomiting pain rating down his left upper extremity as well as to his jaw and neck.  Patient has never had pain like this before.  Patient is supposed to be taking aspirin, however stopped because he did not want to take it anymore.  Denies history of stents.  Discussed with interventional cardiology recommending heparin, aspirin, Brilinta and repeat EKG   (15 Dec 2024 08:18)        SUBJECTIVE: Patient feeling better .      ALLERGIES:  Allergies    No Known Allergies    Intolerances          MEDICATIONS  (STANDING):  aspirin enteric coated 81 milliGRAM(s) Oral every 24 hours  atorvastatin 80 milliGRAM(s) Oral at bedtime  chlorhexidine 2% Cloths 1 Application(s) Topical daily  clopidogrel Tablet 75 milliGRAM(s) Oral every 24 hours  dextrose 5% + sodium chloride 0.45%. 1000 milliLiter(s) (20 mL/Hr) IV Continuous <Continuous>  finasteride 5 milliGRAM(s) Oral daily  heparin   Injectable 5000 Unit(s) SubCutaneous every 8 hours  isosorbide   mononitrate ER Tablet (IMDUR) 30 milliGRAM(s) Oral daily  levothyroxine 175 MICROGram(s) Oral daily  metoprolol tartrate 25 milliGRAM(s) Oral three times a day  multivitamin 1 Tablet(s) Oral daily  pantoprazole    Tablet 40 milliGRAM(s) Oral daily  PARoxetine 40 milliGRAM(s) Oral daily  polyethylene glycol 3350 17 Gram(s) Oral at bedtime  sevelamer carbonate 800 milliGRAM(s) Oral three times a day with meals    MEDICATIONS  (PRN):  acetaminophen     Tablet .. 650 milliGRAM(s) Oral every 6 hours PRN Temp greater or equal to 38C (100.4F), Mild Pain (1 - 3)  acetaminophen   IVPB .. 1000 milliGRAM(s) IV Intermittent once PRN Temp greater or equal to 38C (100.4F), Moderate Pain (4 - 6)  ALPRAZolam 0.25 milliGRAM(s) Oral every 8 hours PRN anxiety  aluminum hydroxide/magnesium hydroxide/simethicone Suspension 30 milliLiter(s) Oral every 4 hours PRN Dyspepsia  bisacodyl Suppository 10 milliGRAM(s) Rectal daily PRN Constipation  melatonin 3 milliGRAM(s) Oral at bedtime PRN Insomnia  morphine  - Injectable 1 milliGRAM(s) IV Push every 2 hours PRN Moderate Pain (4 - 6)  nitroglycerin     SubLingual 0.4 milliGRAM(s) SubLingual every 5 minutes PRN Chest Pain  ondansetron Injectable 4 milliGRAM(s) IV Push every 8 hours PRN Nausea and/or Vomiting      REVIEW OF SYSTEMS:  CONSTITUTIONAL: No fever,  RESPIRATORY: No cough, wheezing and improvement in  shortness of breath  CARDIOVASCULAR: No more  chest pain, No dyspnea, palpitations, dizziness, syncope, paroxysmal nocturnal dyspnea,  or arm or leg swelling and improvement bin SOB .  GASTROINTESTINAL: No abdominal  or epigastric pain, nausea, vomiting,  diarrhea  NEUROLOGICAL: No headaches,  numbness, or tremors  Skin : No itching .  Hematology : No active bleeding .  Endocrinology : No heat and cold intolerance.  Psychiatry : Patient is calm .  Musculoskeletal : Patient has mild arthritis .  Genitourinary : No dysuria .    Vital Signs Last 24 Hrs  T(C): 36.7 (17 Dec 2024 05:30), Max: 36.8 (16 Dec 2024 09:20)  T(F): 98 (17 Dec 2024 05:30), Max: 98.2 (16 Dec 2024 09:20)  HR: 65 (17 Dec 2024 05:30) (63 - 75)  BP: 109/55 (17 Dec 2024 05:30) (98/55 - 139/68)  BP(mean): --  RR: 18 (17 Dec 2024 05:30) (17 - 19)  SpO2: 95% (17 Dec 2024 05:30) (86% - 98%)    Parameters below as of 17 Dec 2024 05:30  Patient On (Oxygen Delivery Method): nasal cannula  O2 Flow (L/min): 2        Vital Signs Last 24 Hrs  T(C): 36.4 (19 Dec 2024 04:37), Max: 37.2 (18 Dec 2024 21:15)  T(F): 97.5 (19 Dec 2024 04:37), Max: 99 (19 Dec 2024 00:00)  HR: 82 (19 Dec 2024 04:37) (62 - 84)  BP: 162/72 (19 Dec 2024 04:37) (98/71 - 162/72)  BP(mean): 97 (18 Dec 2024 21:15) (69 - 97)  RR: 17 (19 Dec 2024 04:37) (17 - 25)  SpO2: 93% (19 Dec 2024 04:37) (93% - 100%)    Parameters below as of 18 Dec 2024 12:55  Patient On (Oxygen Delivery Method): nasal cannula  O2 Flow (L/min): 2      PHYSICAL EXAM:  HEAD:  Atraumatic, Normocephalic  NECK: Supple and normal thyroid.  No JVD or carotid bruit.   HEART: S1, S2 regular , 1/6 soft ejection systolic murmur in mitral area , no thrill and no gallops .  PULMONARY: Bilateral vesicular breathing , few scattered ronchi and few scattered rales are present .  ABDOMEN: Soft nontender and positive bowl sounds   EXTREMITIES:  No clubbing, cyanosis, or pedal  edema  NEUROLOGICAL: AAOX3 , no focal deficit .    LABS:                        9.3    8.91  )-----------( 320      ( 19 Dec 2024 00:41 )             29.4     12-19    138  |  96  |  43[H]  ----------------------------<  98  4.5   |  25  |  4.85[H]    Ca    9.6      19 Dec 2024 00:41  Phos  4.3     12-19  Mg     2.4     12-19    TPro  7.1  /  Alb  3.1[L]  /  TBili  0.5  /  DBili  x   /  AST  44[H]  /  ALT  23  /  AlkPhos  76  12-17        PT/INR - ( 19 Dec 2024 00:41 )   PT: 13.6 sec;   INR: 1.20 ratio         PTT - ( 19 Dec 2024 00:41 )  PTT:30.8 sec  Urinalysis Basic - ( 19 Dec 2024 00:41 )    Color: x / Appearance: x / SG: x / pH: x  Gluc: 98 mg/dL / Ketone: x  / Bili: x / Urobili: x   Blood: x / Protein: x / Nitrite: x   Leuk Esterase: x / RBC: x / WBC x   Sq Epi: x / Non Sq Epi: x / Bacteria: x      BNP      EKG:  ECHO:  IMAGING:    Assessment/Plan    Will continue to follow during hospital course and give further recommendations as needed . Thanks for your referral . if any questions please contact me at office ([phone])cell [phone])  MARIA ALEJANDRA DUNN MD [place] [id number]  [address]  OFFICE : [phone]  CELL : [phone]  CARDIOLOGY F/U :        HPI:     CHIEF COMPLAINT.   . 12/15/2024 Patient BIBA from [facility], complaining of nausea and vomiting and HTN earlier today, has left arm dialysis (MWF), states has not missed any appointments, 18g right hand, given 4mg of zofran prior    OVERALL PRESENTATION.  . 12/15/2024 79-year-old male with past med history of HTN, HLD, dialysis with left arm fistula, Monday Wednesday Friday dialysis who presents emergency department for chest pain.  This started just as patient was going to sleep associate with nausea vomiting pain rating down his left upper extremity as well as to his jaw and neck.  Patient has never had pain like this before.  Patient is supposed to be taking aspirin, however stopped because he did not want to take it anymore.  Denies history of stents.  Discussed with interventional cardiology recommending heparin, aspirin, Brilinta and repeat EKG   (15 Dec 2024 08:18)        SUBJECTIVE: Patient feeling better .      ALLERGIES:  Allergies    No Known Allergies    Intolerances          MEDICATIONS  (STANDING):  aspirin enteric coated 81 milliGRAM(s) Oral every 24 hours  atorvastatin 80 milliGRAM(s) Oral at bedtime  chlorhexidine 2% Cloths 1 Application(s) Topical daily  clopidogrel Tablet 75 milliGRAM(s) Oral every 24 hours  dextrose 5% + sodium chloride 0.45%. 1000 milliLiter(s) (20 mL/Hr) IV Continuous <Continuous>  finasteride 5 milliGRAM(s) Oral daily  heparin   Injectable 5000 Unit(s) SubCutaneous every 8 hours  isosorbide   mononitrate ER Tablet (IMDUR) 30 milliGRAM(s) Oral daily  levothyroxine 175 MICROGram(s) Oral daily  metoprolol tartrate 25 milliGRAM(s) Oral three times a day  multivitamin 1 Tablet(s) Oral daily  pantoprazole    Tablet 40 milliGRAM(s) Oral daily  PARoxetine 40 milliGRAM(s) Oral daily  polyethylene glycol 3350 17 Gram(s) Oral at bedtime  sevelamer carbonate 800 milliGRAM(s) Oral three times a day with meals    MEDICATIONS  (PRN):  acetaminophen     Tablet .. 650 milliGRAM(s) Oral every 6 hours PRN Temp greater or equal to 38C (100.4F), Mild Pain (1 - 3)  acetaminophen   IVPB .. 1000 milliGRAM(s) IV Intermittent once PRN Temp greater or equal to 38C (100.4F), Moderate Pain (4 - 6)  ALPRAZolam 0.25 milliGRAM(s) Oral every 8 hours PRN anxiety  aluminum hydroxide/magnesium hydroxide/simethicone Suspension 30 milliLiter(s) Oral every 4 hours PRN Dyspepsia  bisacodyl Suppository 10 milliGRAM(s) Rectal daily PRN Constipation  melatonin 3 milliGRAM(s) Oral at bedtime PRN Insomnia  morphine  - Injectable 1 milliGRAM(s) IV Push every 2 hours PRN Moderate Pain (4 - 6)  nitroglycerin     SubLingual 0.4 milliGRAM(s) SubLingual every 5 minutes PRN Chest Pain  ondansetron Injectable 4 milliGRAM(s) IV Push every 8 hours PRN Nausea and/or Vomiting      REVIEW OF SYSTEMS:  CONSTITUTIONAL: No fever,  RESPIRATORY: No cough, wheezing and improvement in  shortness of breath  CARDIOVASCULAR: No more  chest pain, No dyspnea, palpitations, dizziness, syncope, paroxysmal nocturnal dyspnea,  or arm or leg swelling and improvement bin SOB .  GASTROINTESTINAL: No abdominal  or epigastric pain, nausea, vomiting,  diarrhea  NEUROLOGICAL: No headaches,  numbness, or tremors  Skin : No itching .  Hematology : No active bleeding .  Endocrinology : No heat and cold intolerance.  Psychiatry : Patient is calm .  Musculoskeletal : Patient has mild arthritis .  Genitourinary : No dysuria .    Vital Signs Last 24 Hrs  T(C): 36.7 (17 Dec 2024 05:30), Max: 36.8 (16 Dec 2024 09:20)  T(F): 98 (17 Dec 2024 05:30), Max: 98.2 (16 Dec 2024 09:20)  HR: 65 (17 Dec 2024 05:30) (63 - 75)  BP: 109/55 (17 Dec 2024 05:30) (98/55 - 139/68)  BP(mean): --  RR: 18 (17 Dec 2024 05:30) (17 - 19)  SpO2: 95% (17 Dec 2024 05:30) (86% - 98%)    Parameters below as of 17 Dec 2024 05:30  Patient On (Oxygen Delivery Method): nasal cannula  O2 Flow (L/min): 2        Vital Signs Last 24 Hrs  T(C): 36.4 (19 Dec 2024 04:37), Max: 37.2 (18 Dec 2024 21:15)  T(F): 97.5 (19 Dec 2024 04:37), Max: 99 (19 Dec 2024 00:00)  HR: 82 (19 Dec 2024 04:37) (62 - 84)  BP: 162/72 (19 Dec 2024 04:37) (98/71 - 162/72)  BP(mean): 97 (18 Dec 2024 21:15) (69 - 97)  RR: 17 (19 Dec 2024 04:37) (17 - 25)  SpO2: 93% (19 Dec 2024 04:37) (93% - 100%)    Parameters below as of 18 Dec 2024 12:55  Patient On (Oxygen Delivery Method): nasal cannula  O2 Flow (L/min): 2      PHYSICAL EXAM:  HEAD:  Atraumatic, Normocephalic  NECK: Supple and normal thyroid.  No JVD or carotid bruit.   HEART: S1, S2 regular , 1/6 soft ejection systolic murmur in mitral area , no thrill and no gallops .  PULMONARY: Bilateral vesicular breathing , few scattered rhonchi and few scattered rales are present .  ABDOMEN: Soft nontender and positive bowl sounds   EXTREMITIES:  No clubbing, cyanosis, or pedal  edema  NEUROLOGICAL: AA , no focal deficit .  Skin : No rashes .  Musculoskeletal : NO joint swellings     LABS:                        9.3    8.91  )-----------( 320      ( 19 Dec 2024 00:41 )             29.4     12-19    138  |  96  |  43[H]  ----------------------------<  98  4.5   |  25  |  4.85[H]    Ca    9.6      19 Dec 2024 00:41  Phos  4.3     12-19  Mg     2.4     12-19    TPro  7.1  /  Alb  3.1[L]  /  TBili  0.5  /  DBili  x   /  AST  44[H]  /  ALT  23  /  AlkPhos  76  12-17        PT/INR - ( 19 Dec 2024 00:41 )   PT: 13.6 sec;   INR: 1.20 ratio         PTT - ( 19 Dec 2024 00:41 )  PTT:30.8 sec  Urinalysis Basic - ( 19 Dec 2024 00:41 )    Color: x / Appearance: x / SG: x / pH: x  Gluc: 98 mg/dL / Ketone: x  / Bili: x / Urobili: x   Blood: x / Protein: x / Nitrite: x   Leuk Esterase: x / RBC: x / WBC x   Sq Epi: x / Non Sq Epi: x / Bacteria: x      Assessment/Plan  79 years old male has chest discomfort and nausea vomiting. Patient chest pain is on the left side of chest and better with nitrates and also has EKG abnormalities . Patient Troponin I elevated . Patient feeling much better at the time of my examination and chest pain significantly better.  Patient mild SOB but better . Patient ambulates with walker and can do more than 100 feet slowly . Patient understands all questions very well and answers appropriately . Patient history obtained from patient himself , RN , PA , PMD and other consultants notes .   Patient Second Troponin I significantly elevated and patient has ACS , NON-ST FRANCK . Continue aspirin , Plavix , nitrates , metoprolol and I/V heparin drip . Will repeat Troponin I levels to see further trending of Troponin I levels . Monitor hemoglobin and electrolytes . Will get echocardiogram done . Patient is chest pain free at this time and doing well . Will arrange cardiac cath  if patient and family agrees . Patient cardiac wise stable and cleared for cardiac cath . Patient hemodynamically stable at this time . Prognosis guarded . Continue rest of medications as such .  Patient has :  1) Chest pain and NON-ST FRANCK and patient S/P  cardiac cath and has BOB done   2) ESRD   3) Mild chronic diastolic heart failure . LV EF 55% .  4) Hypertension and stable .  5) Hypothyroidism .  6) Chronic anemia   Plan : 1) Patient post cath doing well  2) Monitor hemoglobin and electrolytes 3) Rest of medications as such 4) Prognosis guarded .   Will continue to follow during hospital course and give further recommendations as needed . Thanks for your referral . if any questions please contact me at office ([phone] cell [phone])

## 2024-12-18 NOTE — H&P ADULT - PROBLEM SELECTOR PLAN 6
Hx of thyroid cancer s/p total thyroidectomy, now with acquired hypothyrodism  - c/w home levothyroxine

## 2024-12-18 NOTE — PROGRESS NOTE ADULT - PROBLEM SELECTOR PROBLEM 5
Chronic diastolic congestive heart failure
Chronic diastolic congestive heart failure
Anxiety and depression

## 2024-12-18 NOTE — H&P ADULT - PROBLEM SELECTOR PLAN 11
- DVT ppx: HSQ  - Diet: DASH  - Dispo: pending staged PCI with rotation atherectomy planned for 12/19/24

## 2024-12-18 NOTE — H&P ADULT - HISTORY OF PRESENT ILLNESS
79M w/ hx of ESRD on HD (MWF via [facility] AV[facility]), acquired hypothyroidism 2/2 thyroid cancer s/p thyroidectomy, lung cancer, HTN, HLD, BPH, anxiety/depression, MGUS, anemia, former smoker, initially presented to [facility] on 12/15/24 for CP radiating to LUE/neck/jaw a/w nausea and vomiting. Found to have NSTEMI. s/p LHC (12/17/24) showing 90% pRCA, 95% mRCA, 80% dLCx and received DESx1 to dLCx. On DAPT, BB, statin. Now transferred to [facility] for staged PCI with rotational atherectomy of RCA (planned for 12/19/24).    INCOMPLETE    On arrival, VS afebrile, HR 72, /67, RR 18, sating 96% on 2LNC. Admitted to Medicine. 79M w/ hx of ESRD on HD (MWF via [facility] AVF), acquired hypothyroidism 2/2 thyroid cancer s/p thyroidectomy, lung cancer, HTN, HLD, BPH, anxiety/depression, MGUS, anemia, former smoker, initially presented to [facility] on 12/15/24 for CP radiating to LUE/neck/jaw a/w nausea and vomiting. Found to have NSTEMI. s/p LHC (12/17/24) showing 90% pRCA, 95% mRCA, 80% dLCx and received DESx1 to dLCx. On DAPT, BB, statin. Now transferred to [facility] for staged PCI with rotational atherectomy of RCA (planned for 12/19/24).    On arrival, VS afebrile, HR 72, /67, RR 18, sating 96% on 2LNC. Pt denied any further CP. Otherwise, denied f/c/n/v, SOB, abdominal pain. Reported constipation, last BM 5 days ago. Endorsed some dysuria. Admitted to Medicine. 79M w/ hx of ESRD on HD (MWF via [facility] AV[facility]), acquired hypothyroidism 2/2 thyroid cancer s/p thyroidectomy, lung cancer, HTN, HLD, BPH, anxiety/depression, MGUS, anemia, former smoker, initially presented to [facility] on 12/15/24 for CP radiating to LUE/neck/jaw a/w nausea and vomiting. Found to have NSTEMI. s/p LHC (12/17/24) showing 90% pRCA, 95% mRCA, 80% dLCx and received DESx1 to dLCx. On DAPT, BB, statin. Now transferred to [facility] for staged PCI with rotational atherectomy of RCA (planned for 12/19/24).    On arrival, VS afebrile, HR 72, /67, RR 18, sating 96% on 2LNC. Pt denied any further CP. Otherwise, denied f/c/n/v, SOB, abdominal pain. Reported constipation, last BM 5 days ago. Endorsed some recent dysuria. Admitted to Medicine. 79M w/ hx of ESRD on HD (MWF via [facility] AVF), acquired hypothyroidism 2/2 thyroid cancer s/p thyroidectomy, lung cancer, HTN, HLD, BPH, anxiety/depression, MGUS, anemia, carotid stenosis s/p CEA, former smoker, initially presented to [facility] on 12/15/24 for CP radiating to LUE/neck/jaw a/w nausea and vomiting. Found to have NSTEMI. s/p LHC (12/17/24) showing 90% pRCA, 95% mRCA, 80% dLCx and received DESx1 to dLCx. On DAPT, BB, statin. Now transferred to [facility] for staged PCI with rotational atherectomy of RCA (planned for 12/19/24).    On arrival, VS afebrile, HR 72, /67, RR 18, sating 96% on 2LNC. Pt denied any further CP. Otherwise, denied f/c/n/v, SOB, abdominal pain. Reported constipation, last BM 5 days ago. Endorsed some recent dysuria. Admitted to Medicine.

## 2024-12-18 NOTE — PROGRESS NOTE ADULT - PROBLEM SELECTOR PLAN 2
HD as per schedule , nephrology is following
- observed overnight in CPU  - Plan for staged PCI with atherectomy of residual RCA disease at [facility] on Thursday, 12/19/2024.  Will need transfer arranged to hospitalist service in anticipation of staged PCI.    - Post cath/PCI routine VS, access site, neuro-vascular monitoring and RUE post access precautions ordered  - Post cath access site precautions reviewed with pt who verbalized good understanding  - No post cath hydration d/t ESRD on HD  -  OOB as tolerated this am with assist.  - F/U labs and EKG reviewed this am  - Continue dual anti platelet therapy with aspirin AND clopidogrel.   - Pt education provided/reinforced re: importance of strict adherence to uninterrupted DAPT for minimum of 9-12 months (cardiologist will determine duration)  - Patient educated on benefits of Cardiac Rehab Program; referral provided to patient. Referral faxed and copy placed in medical record. Patient given list of locations with phone numbers of local rehab facilities and advised to contact their insurance company for participating providers. Patient educated on need to bring discharge documents including cardiovascular history, medications, and testing/treatments to first appointment.  - Continue statin, beta blocker   - Lifestyle modifications discussed to reduce cardiovascular risk factors including weight reduction, smoking cessation (referral provided if applicable), medication compliance, and routine follow up with Cardiologist to track your BMI, cholesterol, and glucose levels.   - Follow-up on 12/23/2024 @ 1 pm with Dr Vazquez for post PCI check  - Follow-up in 2 weeks with outpatient/referring cardiologist

## 2024-12-18 NOTE — H&P ADULT - ASSESSMENT
79M w/ hx of ESRD on HD (MWF via [facility] AVF), acquired hypothyroidism 2/2 thyroid cancer s/p thyroidectomy, lung cancer, HTN, HLD, BPH, anxiety/depression, MGUS, anemia, former smoker, initially presented to [facility] on 12/15/24 for CP radiating to LUE/neck/jaw a/w nausea and vomiting. Found to have NSTEMI. s/p LHC (12/17/24) showing 90% pRCA, 95% mRCA, 80% dLCx and received DESx1 to dLCx. Now transferred to [facility] for staged PCI with rotational atherectomy of RCA on 12/19/24.

## 2024-12-18 NOTE — PROGRESS NOTE ADULT - PROBLEM SELECTOR PLAN 5
Admit to monitored unit for cardiac monitoring, obtain echo to evaluate LVEF, prn  diuresis as per card consult , monitor ins/outs, monitor renal profile and electrolytes closely ,send 3 sets of enzymes, O2 supply, serial chest xrays, monitor weights and oral intake of fluids, nutritionist consult
continue home medications

## 2024-12-19 DIAGNOSIS — I21.4 NON-ST ELEVATION (NSTEMI) MYOCARDIAL INFARCTION: ICD-10-CM

## 2024-12-19 DIAGNOSIS — K59.00 CONSTIPATION, UNSPECIFIED: ICD-10-CM

## 2024-12-19 DIAGNOSIS — Z79.899 OTHER LONG TERM (CURRENT) DRUG THERAPY: ICD-10-CM

## 2024-12-19 DIAGNOSIS — N40.0 BENIGN PROSTATIC HYPERPLASIA WITHOUT LOWER URINARY TRACT SYMPTOMS: ICD-10-CM

## 2024-12-19 DIAGNOSIS — D47.2 MONOCLONAL GAMMOPATHY: ICD-10-CM

## 2024-12-19 DIAGNOSIS — F41.9 ANXIETY DISORDER, UNSPECIFIED: ICD-10-CM

## 2024-12-19 DIAGNOSIS — N18.6 END STAGE RENAL DISEASE: ICD-10-CM

## 2024-12-19 DIAGNOSIS — Z85.118 PERSONAL HISTORY OF OTHER MALIGNANT NEOPLASM OF BRONCHUS AND LUNG: ICD-10-CM

## 2024-12-19 DIAGNOSIS — I25.10 ATHEROSCLEROTIC HEART DISEASE OF NATIVE CORONARY ARTERY WITHOUT ANGINA PECTORIS: ICD-10-CM

## 2024-12-19 DIAGNOSIS — E03.9 HYPOTHYROIDISM, UNSPECIFIED: ICD-10-CM

## 2024-12-19 DIAGNOSIS — C34.90 MALIGNANT NEOPLASM OF UNSPECIFIED PART OF UNSPECIFIED BRONCHUS OR LUNG: ICD-10-CM

## 2024-12-19 DIAGNOSIS — Z29.9 ENCOUNTER FOR PROPHYLACTIC MEASURES, UNSPECIFIED: ICD-10-CM

## 2024-12-19 DIAGNOSIS — E89.0 POSTPROCEDURAL HYPOTHYROIDISM: Chronic | ICD-10-CM

## 2024-12-19 DIAGNOSIS — E78.5 HYPERLIPIDEMIA, UNSPECIFIED: ICD-10-CM

## 2024-12-19 LAB
ADD ON TEST-SPECIMEN IN LAB: SIGNIFICANT CHANGE UP
ANION GAP SERPL CALC-SCNC: 17 MMOL/L — SIGNIFICANT CHANGE UP (ref 5–17)
APPEARANCE UR: CLEAR — SIGNIFICANT CHANGE UP
APTT BLD: 30.8 SEC — SIGNIFICANT CHANGE UP (ref 24.5–35.6)
BACTERIA # UR AUTO: NEGATIVE /HPF — SIGNIFICANT CHANGE UP
BILIRUB UR-MCNC: NEGATIVE — SIGNIFICANT CHANGE UP
BUN SERPL-MCNC: 43 MG/DL — HIGH (ref 7–23)
CALCIUM SERPL-MCNC: 9.6 MG/DL — SIGNIFICANT CHANGE UP (ref 8.4–10.5)
CAST: 1 /LPF — SIGNIFICANT CHANGE UP (ref 0–4)
CHLORIDE SERPL-SCNC: 96 MMOL/L — SIGNIFICANT CHANGE UP (ref 96–108)
CO2 SERPL-SCNC: 25 MMOL/L — SIGNIFICANT CHANGE UP (ref 22–31)
COLOR SPEC: YELLOW — SIGNIFICANT CHANGE UP
CREAT SERPL-MCNC: 4.85 MG/DL — HIGH (ref 0.5–1.3)
DIFF PNL FLD: ABNORMAL
EGFR: 12 ML/MIN/1.73M2 — LOW
GLUCOSE SERPL-MCNC: 98 MG/DL — SIGNIFICANT CHANGE UP (ref 70–99)
GLUCOSE UR QL: 100 MG/DL
HBV CORE AB SER-ACNC: SIGNIFICANT CHANGE UP
HBV SURFACE AB SER-ACNC: <3 MIU/ML — LOW
HBV SURFACE AG SER-ACNC: SIGNIFICANT CHANGE UP
HCT VFR BLD CALC: 29.4 % — LOW (ref 39–50)
HCV AB S/CO SERPL IA: 0.09 S/CO — SIGNIFICANT CHANGE UP (ref 0–0.99)
HCV AB SERPL-IMP: SIGNIFICANT CHANGE UP
HGB BLD-MCNC: 9.3 G/DL — LOW (ref 13–17)
INR BLD: 1.2 RATIO — HIGH (ref 0.85–1.16)
KETONES UR-MCNC: ABNORMAL MG/DL
LEUKOCYTE ESTERASE UR-ACNC: NEGATIVE — SIGNIFICANT CHANGE UP
MAGNESIUM SERPL-MCNC: 2.4 MG/DL — SIGNIFICANT CHANGE UP (ref 1.6–2.6)
MCHC RBC-ENTMCNC: 31.6 G/DL — LOW (ref 32–36)
MCHC RBC-ENTMCNC: 33.1 PG — SIGNIFICANT CHANGE UP (ref 27–34)
MCV RBC AUTO: 104.6 FL — HIGH (ref 80–100)
NITRITE UR-MCNC: NEGATIVE — SIGNIFICANT CHANGE UP
NRBC # BLD: 0 /100 WBCS — SIGNIFICANT CHANGE UP (ref 0–0)
PH UR: >=9 (ref 5–8)
PHOSPHATE SERPL-MCNC: 4.3 MG/DL — SIGNIFICANT CHANGE UP (ref 2.5–4.5)
PLATELET # BLD AUTO: 320 K/UL — SIGNIFICANT CHANGE UP (ref 150–400)
POTASSIUM SERPL-MCNC: 4.5 MMOL/L — SIGNIFICANT CHANGE UP (ref 3.5–5.3)
POTASSIUM SERPL-SCNC: 4.5 MMOL/L — SIGNIFICANT CHANGE UP (ref 3.5–5.3)
PROT UR-MCNC: 300 MG/DL
PROTHROM AB SERPL-ACNC: 13.6 SEC — HIGH (ref 9.9–13.4)
RBC # BLD: 2.81 M/UL — LOW (ref 4.2–5.8)
RBC # FLD: 14.6 % — HIGH (ref 10.3–14.5)
RBC CASTS # UR COMP ASSIST: 0 /HPF — SIGNIFICANT CHANGE UP (ref 0–4)
SODIUM SERPL-SCNC: 138 MMOL/L — SIGNIFICANT CHANGE UP (ref 135–145)
SP GR SPEC: 1.01 — SIGNIFICANT CHANGE UP (ref 1–1.03)
SQUAMOUS # UR AUTO: 0 /HPF — SIGNIFICANT CHANGE UP (ref 0–5)
TROPONIN T, HIGH SENSITIVITY RESULT: HIGH NG/L (ref 0–51)
UROBILINOGEN FLD QL: 0.2 MG/DL — SIGNIFICANT CHANGE UP (ref 0.2–1)
WBC # BLD: 8.91 K/UL — SIGNIFICANT CHANGE UP (ref 3.8–10.5)
WBC # FLD AUTO: 8.91 K/UL — SIGNIFICANT CHANGE UP (ref 3.8–10.5)
WBC UR QL: 7 /HPF — HIGH (ref 0–5)

## 2024-12-19 PROCEDURE — 92933 PRQ TRLML C ATHRC ST ANGIOP1: CPT | Mod: RC

## 2024-12-19 PROCEDURE — 92978 ENDOLUMINL IVUS OCT C 1ST: CPT | Mod: 26,RC

## 2024-12-19 PROCEDURE — 93010 ELECTROCARDIOGRAM REPORT: CPT

## 2024-12-19 PROCEDURE — 99232 SBSQ HOSP IP/OBS MODERATE 35: CPT

## 2024-12-19 PROCEDURE — 99152 MOD SED SAME PHYS/QHP 5/>YRS: CPT

## 2024-12-19 RX ORDER — SENNOSIDES 8.6 MG
2 TABLET ORAL AT BEDTIME
Refills: 0 | Status: DISCONTINUED | OUTPATIENT
Start: 2024-12-19 | End: 2024-12-20

## 2024-12-19 RX ORDER — SERTRALINE HYDROCHLORIDE 100 MG/1
1 TABLET, FILM COATED ORAL
Refills: 0 | DISCHARGE

## 2024-12-19 RX ORDER — HEPARIN SODIUM,PORCINE 1000/ML
5000 VIAL (ML) INJECTION EVERY 8 HOURS
Refills: 0 | Status: DISCONTINUED | OUTPATIENT
Start: 2024-12-19 | End: 2024-12-20

## 2024-12-19 RX ORDER — ONDANSETRON HYDROCHLORIDE 4 MG/1
4 TABLET, FILM COATED ORAL ONCE
Refills: 0 | Status: COMPLETED | OUTPATIENT
Start: 2024-12-19 | End: 2024-12-19

## 2024-12-19 RX ORDER — TAMSULOSIN HYDROCHLORIDE 0.4 MG/1
1 CAPSULE ORAL
Refills: 0 | DISCHARGE

## 2024-12-19 RX ORDER — GABAPENTIN 300 MG/1
1 CAPSULE ORAL
Refills: 0 | DISCHARGE

## 2024-12-19 RX ORDER — MIDODRINE HYDROCHLORIDE 5 MG/1
1 TABLET ORAL
Refills: 0 | DISCHARGE

## 2024-12-19 RX ORDER — PATIROMER 25.2 G/1
0 POWDER, FOR SUSPENSION ORAL
Refills: 0 | DISCHARGE

## 2024-12-19 RX ADMIN — METOPROLOL TARTRATE 25 MILLIGRAM(S): 100 TABLET, FILM COATED ORAL at 15:47

## 2024-12-19 RX ADMIN — CLOPIDOGREL 75 MILLIGRAM(S): 75 TABLET, FILM COATED ORAL at 06:45

## 2024-12-19 RX ADMIN — Medication 80 MILLIGRAM(S): at 21:28

## 2024-12-19 RX ADMIN — Medication 175 MICROGRAM(S): at 06:45

## 2024-12-19 RX ADMIN — POLYETHYLENE GLYCOL 3350 17 GRAM(S): 17 POWDER, FOR SOLUTION ORAL at 16:55

## 2024-12-19 RX ADMIN — SEVELAMER CARBONATE 800 MILLIGRAM(S): 800 TABLET, FILM COATED ORAL at 16:17

## 2024-12-19 RX ADMIN — METOPROLOL TARTRATE 25 MILLIGRAM(S): 100 TABLET, FILM COATED ORAL at 06:45

## 2024-12-19 RX ADMIN — Medication 1 TABLET(S): at 15:48

## 2024-12-19 RX ADMIN — PAROXETINE HYDROCHLORIDE HEMIHYDRATE 30 MILLIGRAM(S): 37.5 TABLET, FILM COATED, EXTENDED RELEASE ORAL at 15:47

## 2024-12-19 RX ADMIN — Medication 2 TABLET(S): at 16:57

## 2024-12-19 RX ADMIN — ACETAMINOPHEN, DIPHENHYDRAMINE HCL, PHENYLEPHRINE HCL 3 MILLIGRAM(S): 325; 25; 5 TABLET ORAL at 21:30

## 2024-12-19 RX ADMIN — Medication 81 MILLIGRAM(S): at 06:45

## 2024-12-19 RX ADMIN — Medication 30 MILLIGRAM(S): at 15:47

## 2024-12-19 RX ADMIN — SEVELAMER CARBONATE 800 MILLIGRAM(S): 800 TABLET, FILM COATED ORAL at 15:48

## 2024-12-19 RX ADMIN — Medication 5 MILLIGRAM(S): at 15:47

## 2024-12-19 RX ADMIN — PANTOPRAZOLE SODIUM 40 MILLIGRAM(S): 40 TABLET, DELAYED RELEASE ORAL at 06:45

## 2024-12-19 RX ADMIN — METOPROLOL TARTRATE 25 MILLIGRAM(S): 100 TABLET, FILM COATED ORAL at 21:30

## 2024-12-19 RX ADMIN — Medication 30 MILLILITER(S): at 21:29

## 2024-12-19 RX ADMIN — ONDANSETRON HYDROCHLORIDE 4 MILLIGRAM(S): 4 TABLET, FILM COATED ORAL at 10:58

## 2024-12-19 NOTE — PROGRESS NOTE ADULT - PROBLEM SELECTOR PLAN 2
Goal is to keep LDL<70. Continue with your cholesterol medications as prescribed. Eat a heart healthy diet that is low in saturated fats and salt, and includes whole grains, fruits, vegetables and lean protein; exercise regularly (consult with your physician or cardiologist first); maintain a heart healthy weight; if you smoke - quit (A resource to help you stop smoking is the [facility] – phone number [phone].). Continue to follow with your primary physician or cardiologist.

## 2024-12-19 NOTE — PHYSICAL THERAPY INITIAL EVALUATION ADULT - ADDITIONAL COMMENTS
Pt presents from the [facility] assisted [facility] facility, was independent with all functional mobility and ADL performance with a Rolator walker

## 2024-12-19 NOTE — PHYSICAL THERAPY INITIAL EVALUATION ADULT - PERTINENT HX OF CURRENT PROBLEM, REHAB EVAL
Patient is a 79y old  Male who presents with a chief complaint of transfer for staged PCI with rotational atherectomy (18 Dec 2024 22:05)

## 2024-12-19 NOTE — PRE-OP CHECKLIST - WAS PATIENT ON BETA BLOCKER?
Done, at the .  
Lov: 3/30/17  Nov: 6/26/17  Last refill:   Adderall XR 10mg cap - 1 daily - 4/30/17 - 30x0  Forwarded to HIRA Regalado in providers absence.  
Patient called back to check on refill.  Wondering if he can pick it up today as he is out of medication.  Please call him when it's ready.    
Patient called.  States he is completely out of medication and doesn't meet with Betsy again until the end of June.  Can he please get a refill?  
Patient notified.  
Patient picked up prescription.  Verified with photo ID.  
Yes
n/a

## 2024-12-20 ENCOUNTER — TRANSCRIPTION ENCOUNTER (OUTPATIENT)
Age: 79
End: 2024-12-20

## 2024-12-20 VITALS
HEART RATE: 78 BPM | RESPIRATION RATE: 19 BRPM | OXYGEN SATURATION: 95 % | DIASTOLIC BLOOD PRESSURE: 68 MMHG | TEMPERATURE: 98 F | SYSTOLIC BLOOD PRESSURE: 151 MMHG

## 2024-12-20 LAB
FOLATE SERPL-MCNC: 8.3 NG/ML — SIGNIFICANT CHANGE UP
VIT B12 SERPL-MCNC: >2000 PG/ML — HIGH (ref 232–1245)

## 2024-12-20 PROCEDURE — C9602: CPT | Mod: RC

## 2024-12-20 PROCEDURE — C1874: CPT

## 2024-12-20 PROCEDURE — 86900 BLOOD TYPING SEROLOGIC ABO: CPT

## 2024-12-20 PROCEDURE — 85610 PROTHROMBIN TIME: CPT

## 2024-12-20 PROCEDURE — 82607 VITAMIN B-12: CPT

## 2024-12-20 PROCEDURE — 80048 BASIC METABOLIC PNL TOTAL CA: CPT

## 2024-12-20 PROCEDURE — 84484 ASSAY OF TROPONIN QUANT: CPT

## 2024-12-20 PROCEDURE — C1894: CPT

## 2024-12-20 PROCEDURE — C1769: CPT

## 2024-12-20 PROCEDURE — 86850 RBC ANTIBODY SCREEN: CPT

## 2024-12-20 PROCEDURE — 84100 ASSAY OF PHOSPHORUS: CPT

## 2024-12-20 PROCEDURE — 93005 ELECTROCARDIOGRAM TRACING: CPT

## 2024-12-20 PROCEDURE — 86901 BLOOD TYPING SEROLOGIC RH(D): CPT

## 2024-12-20 PROCEDURE — C1753: CPT

## 2024-12-20 PROCEDURE — 99261: CPT

## 2024-12-20 PROCEDURE — 81001 URINALYSIS AUTO W/SCOPE: CPT

## 2024-12-20 PROCEDURE — 85027 COMPLETE CBC AUTOMATED: CPT

## 2024-12-20 PROCEDURE — 82746 ASSAY OF FOLIC ACID SERUM: CPT

## 2024-12-20 PROCEDURE — 92978 ENDOLUMINL IVUS OCT C 1ST: CPT | Mod: RC

## 2024-12-20 PROCEDURE — C1724: CPT

## 2024-12-20 PROCEDURE — C1725: CPT

## 2024-12-20 PROCEDURE — 97162 PT EVAL MOD COMPLEX 30 MIN: CPT

## 2024-12-20 PROCEDURE — 85730 THROMBOPLASTIN TIME PARTIAL: CPT

## 2024-12-20 PROCEDURE — C1887: CPT

## 2024-12-20 PROCEDURE — 83735 ASSAY OF MAGNESIUM: CPT

## 2024-12-20 PROCEDURE — C1889: CPT

## 2024-12-20 PROCEDURE — 36415 COLL VENOUS BLD VENIPUNCTURE: CPT

## 2024-12-20 RX ORDER — CLOPIDOGREL 75 MG/1
1 TABLET, FILM COATED ORAL
Qty: 90 | Refills: 3
Start: 2024-12-20 | End: 2025-12-14

## 2024-12-20 RX ADMIN — Medication 1 TABLET(S): at 13:37

## 2024-12-20 RX ADMIN — Medication 30 MILLIGRAM(S): at 13:37

## 2024-12-20 RX ADMIN — Medication 5 MILLIGRAM(S): at 13:36

## 2024-12-20 RX ADMIN — PANTOPRAZOLE SODIUM 40 MILLIGRAM(S): 40 TABLET, DELAYED RELEASE ORAL at 06:11

## 2024-12-20 RX ADMIN — SEVELAMER CARBONATE 800 MILLIGRAM(S): 800 TABLET, FILM COATED ORAL at 07:46

## 2024-12-20 RX ADMIN — Medication 175 MICROGRAM(S): at 06:11

## 2024-12-20 RX ADMIN — METOPROLOL TARTRATE 25 MILLIGRAM(S): 100 TABLET, FILM COATED ORAL at 06:11

## 2024-12-20 RX ADMIN — Medication 81 MILLIGRAM(S): at 06:10

## 2024-12-20 RX ADMIN — CLOPIDOGREL 75 MILLIGRAM(S): 75 TABLET, FILM COATED ORAL at 06:11

## 2024-12-20 RX ADMIN — METOPROLOL TARTRATE 25 MILLIGRAM(S): 100 TABLET, FILM COATED ORAL at 13:37

## 2024-12-20 RX ADMIN — PAROXETINE HYDROCHLORIDE HEMIHYDRATE 30 MILLIGRAM(S): 37.5 TABLET, FILM COATED, EXTENDED RELEASE ORAL at 13:37

## 2024-12-20 NOTE — CHART NOTE - NSCHARTNOTEFT_GEN_A_CORE
Cardiac Rehab (Post PCI):         ****** Reasons for NO Cardiac rehab referral rx:            Medical Reason: ex has Home Care, Home PT, incomplete revascularization
Removal of Femoral Sheath    Pulses in the right/left lower extremity are palpable/audible by doppler/absent.   The patient was placed in the supine position. The insertion site was identified and the sutures were removed per protocol.    The _6 and 7___ [place] femoral sheath was then removed.   Direct pressure was applied for  _20_____ minutes.   Complications: None, VSS, Good Hemostasis.     Monitoring of the right/left groin and both lower extremities including neuro-vascular checks and vital signs every 15 minutes x 4, then every 30 minutes x 2, then every 1 hour was ordered.    Discharge Instruction discussed with patient: ASA, Plavix/Brilinta, statin, diet, activities, access site care, follow up care, reportable signs and symptoms.     A/P   79M w/ hx of ESRD on HD (MWF via LUE AVF), acquired hypothyroidism 2/2 thyroid cancer s/p thyroidectomy, lung cancer, HTN, HLD, BPH, anxiety/depression, MGUS, anemia, carotid stenosis s/p CEA, former smoker, initially presented to [facility] on 12/15/24 for CP radiating to LUE/neck/jaw a/w nausea and vomiting. Found to have NSTEMI. s/p LHC (12/17/24) showing 90% pRCA, 95% mRCA, 80% dLCx and received DESx1 to dLCx. On DAPT, BB, statin. Now transferred to [facility] for staged PCI with rotational atherectomy of RCA (planned for 12/19/24).    12/19  s/p [place] RCA with 2.75x48 and 3.0x18 BOB.       Plan: continue to monitor, Continue ASA, Plavix, Statin,   discharge home in am if stable.  Pt will follow up with his/her cardiologist in 1-2weeks.
Confidential Drug Utilization Report  Search Terms: Mark Cobian, 1945 Search Date: 12/19/2024 02:46:46 AM  Searching on behalf of: [facility]  The Drug Utilization Report below displays all of the controlled substance prescriptions, if any, that your patient has filled in the last twelve months. The information displayed on this report is compiled from pharmacy submissions to the Department, and accurately reflects the information as submitted by the pharmacies.    This report was requested by: Rojas Lowery | Reference #: [id number]    Practitioner Count: 1  Pharmacy Count: 1  Current Opioid Prescriptions: 0  Current Benzodiazepine Prescriptions: 0  Current Stimulant Prescriptions: 0      Patient Demographic Information (PDI)       PDI	First Name	Last Name	Birth Date	Gender	Street Address	[facility]	State	Zip Code  A	Mark Cobian	1945	Male	[address] [place]	[address]    Prescription Information      PDI Filter:    PDI	Current Rx	Drug Type	Rx Written	Rx Dispensed	Drug	Quantity	Days Supply	Prescriber Name  A	N	O	11/14/2024	11/14/2024	tramadol hcl 50 mg tablet	5	5	Nehemias Warren  Prescriber MACI # [id number]  Payment Method Cash  Dispenser \[facility]"" [facility]    * - Details of Drug Type : O = Opioid, B = Benzodiazepine, S = Stimulant    * - Drugs marked with an asterisk are compound drugs. If the compound drug is made up of more than one controlled substance, then each controlled substance will be a separate row in the table.            † Click the ‘Report Suspicious Activity’ button to report information related to controlled substance suspicious activity to the [place] [employer].    †† Click the ‘Send Questions/Comments’ button to send questions about this report to the [place] [employer], or call [phone].    ††† Click the ‘Substance Use Disorder Treatment’ button to go to the Office of Addiction Services and Supports website, www.[place].gov or call 1-117.273.1318.    © 2017 [facility] Department of Health -[place] Bdndltkcclp32/19/2024 02:46  .
s/p HD post PCI    RRA abd RFA site benign,   VSS,   denies chest pain or SOB  Renal and hospitalist cleared for discharge  Spoke to pt's daughter   Pt will be picked up by pt's daughter in pm ([facility] living)

## 2024-12-20 NOTE — DISCHARGE NOTE PROVIDER - HOSPITAL COURSE
80 yo M with PMHx of HTN, HLD, ESRD on HD with left arm fistula (M/W/F), who presented to [facility] ER c/o chest pain associated with nausea & vomiting and radiating his left upper extremity as well as to his jaw and neck. pt was found to have NSTEMI then was transferred to [facility] for cardiac cath by Dr. Vazquez.     Now s/p stent in LCX on 12/27 and had staged stent in RCA on 12/19. RRA and RFA sites are stable, no hematoma or ecchymosis.     < from: Cardiac Catheterization (12.17.24 @ 11:27) >  - 2V CAD with heavily calcified 90% pRCA and 95% mRCA, 80% dLCx   - s/p IVUS guided PCU of dLCx with 3.5x18 [place] BOB   - LVEDP 11 < end of copied text >  < from: Cardiac Catheterization (12.19.24 @ 08:56) >  s/p IVUS guided PCI with rotational atherectomy follow by placement of overlaping 2.75 x 48 mm and 3.0 x 18mm [place] BOB   - LVEDP 20    Pt is s/p HD on 12/20, PT recommended Home PT, plan to discharge back to [facility] after HD.      80 yo M with PMHx of HTN, HLD, ESRD on HD with left arm fistula (M/W/F), who presented to [facility] ER c/o chest pain associated with nausea & vomiting and radiating his left upper extremity as well as to his jaw and neck. pt was found to have NSTEMI then was transferred to [facility] for cardiac cath by Dr. Vazquez.     Now s/p stent in LCX on 12/27 and had staged stent in RCA on 12/19. RRA and RFA sites are stable, no hematoma or ecchymosis.   Pt is s/p HD on 12/20, PT recommended Home PT, plan to discharge back to [facility] after HD.     < from: Cardiac Catheterization (12.17.24 @ 11:27) >  - 2V CAD with heavily calcified 90% pRCA and 95% mRCA, 80% dLCx   - s/p IVUS guided PCU of dLCx with 3.5x18 [place] BOB   - LVEDP 11 < end of copied text >  < from: Cardiac Catheterization (12.19.24 @ 08:56) >  s/p IVUS guided PCI with rotational atherectomy follow by placement of overlaping 2.75 x 48 mm and 3.0 x 18mm [place] BOB   - LVEDP 20

## 2024-12-20 NOTE — DISCHARGE NOTE PROVIDER - NSDCCPTREATMENT_GEN_ALL_CORE_FT
PRINCIPAL PROCEDURE  Procedure: PTCA, with stent insertion, in cardiac catheterization lab  Findings and Treatment: LCX and RCA

## 2024-12-20 NOTE — PROGRESS NOTE ADULT - SUBJECTIVE AND OBJECTIVE BOX
Patient is a 79y old  Male who presents with a chief complaint of transfer for staged PCI with rotational atherectomy (20 Dec 2024 07:59)    Date of servie : 24 @ 11:41  INTERVAL HPI/OVERNIGHT EVENTS:  T(C): 36.3 (24 @ 08:15), Max: 36.7 (24 @ 00:02)  HR: 67 (24 @ 08:15) (67 - 84)  BP: 158/82 (24 @ 08:15) (137/59 - 184/85)  RR: 16 (24 @ 08:15) (16 - 20)  SpO2: 92% (24 @ 08:15) (92% - 99%)  Wt(kg): --  I&O's Summary    19 Dec 2024 07:01  -  20 Dec 2024 07:00  --------------------------------------------------------  IN: 240 mL / OUT: 200 mL / NET: 40 mL    20 Dec 2024 07:01  -  20 Dec 2024 11:41  --------------------------------------------------------  IN: 0 mL / OUT: 0 mL / NET: 0 mL        LABS:                        9.3    8.91  )-----------( 320      ( 19 Dec 2024 00:41 )             29.4     12-    138  |  96  |  43[H]  ----------------------------<  98  4.5   |  25  |  4.85[H]    Ca    9.6      19 Dec 2024 00:41  Phos  4.3     12-  Mg     2.4     12-      PT/INR - ( 19 Dec 2024 00:41 )   PT: 13.6 sec;   INR: 1.20 ratio         PTT - ( 19 Dec 2024 00:41 )  PTT:30.8 sec  Urinalysis Basic - ( 19 Dec 2024 11:39 )    Color: Yellow / Appearance: Clear / S.014 / pH: x  Gluc: x / Ketone: Trace mg/dL  / Bili: Negative / Urobili: 0.2 mg/dL   Blood: x / Protein: 300 mg/dL / Nitrite: Negative   Leuk Esterase: Negative / RBC: 0 /HPF / WBC 7 /HPF   Sq Epi: x / Non Sq Epi: 0 /HPF / Bacteria: Negative /HPF      CAPILLARY BLOOD GLUCOSE            Urinalysis Basic - ( 19 Dec 2024 11:39 )    Color: Yellow / Appearance: Clear / S.014 / pH: x  Gluc: x / Ketone: Trace mg/dL  / Bili: Negative / Urobili: 0.2 mg/dL   Blood: x / Protein: 300 mg/dL / Nitrite: Negative   Leuk Esterase: Negative / RBC: 0 /HPF / WBC 7 /HPF   Sq Epi: x / Non Sq Epi: 0 /HPF / Bacteria: Negative /HPF        MEDICATIONS  (STANDING):  aspirin enteric coated 81 milliGRAM(s) Oral daily  atorvastatin 80 milliGRAM(s) Oral at bedtime  clopidogrel Tablet 75 milliGRAM(s) Oral daily  finasteride 5 milliGRAM(s) Oral daily  heparin   Injectable 5000 Unit(s) SubCutaneous every 8 hours  isosorbide   mononitrate ER Tablet (IMDUR) 30 milliGRAM(s) Oral daily  levothyroxine 175 MICROGram(s) Oral daily  metoprolol tartrate 25 milliGRAM(s) Oral three times a day  multivitamin 1 Tablet(s) Oral daily  pantoprazole    Tablet 40 milliGRAM(s) Oral before breakfast  PARoxetine 30 milliGRAM(s) Oral daily  polyethylene glycol 3350 17 Gram(s) Oral at bedtime  senna 2 Tablet(s) Oral at bedtime  sevelamer carbonate 800 milliGRAM(s) Oral three times a day with meals    MEDICATIONS  (PRN):  acetaminophen     Tablet .. 650 milliGRAM(s) Oral every 6 hours PRN Temp greater or equal to 38C (100.4F), Mild Pain (1 - 3)  ALPRAZolam 0.25 milliGRAM(s) Oral every 8 hours PRN anxiety  bisacodyl Suppository 10 milliGRAM(s) Rectal daily PRN Constipation  magnesium hydroxide Suspension 30 milliLiter(s) Oral daily PRN Constipation  melatonin 3 milliGRAM(s) Oral at bedtime PRN Insomnia          PHYSICAL EXAM:  GENERAL: NAD, well-groomed, well-developed  HEAD:  Atraumatic, Normocephalic  CHEST/LUNG: Clear to percussion bilaterally; No rales, rhonchi, wheezing, or rubs  HEART: Regular rate and rhythm; No murmurs, rubs, or gallops  ABDOMEN: Soft, Nontender, Nondistended; Bowel sounds present  EXTREMITIES:  2+ Peripheral Pulses, No clubbing, cyanosis, or edema  LYMPH: No lymphadenopathy noted  SKIN: No rashes or lesions    Care Discussed with Consultants/Other Providers [ ] YES  [ ] NO
Seen in [facility], no CP, SOB, s/p stable HD today     Vital Signs Last 24 Hrs  T(C): 36.4 (24 @ 11:26), Max: 36.7 (24 @ 00:02)  T(F): 97.5 (24 @ 11:26), Max: 98.1 (24 @ 00:02)  HR: 73 (24 @ 11:26) (67 - 84)  BP: 155/78 (24 @ 11:26) (137/59 - 184/85)  BP(mean): 119 (24 @ 07:00) (94 - 126)  RR: 17 (24 @ 11:) (16 - 20)  SpO2: 93% (24 @ 11:26) (92% - 99%)    I&O's Detail    20 Dec 2024 07:01  -  20 Dec 2024 12:56  --------------------------------------------------------  OUT:    Oral Fluid: 0 mL    Other (mL): 2000 mL  Total OUT: 2000 mL    s1s2  b/l air entry  soft, ND  sm edema, LUE AVF + bruit                                  9.3    8.91  )-----------( 320      ( 19 Dec 2024 00:41 )             29.4     19 Dec 2024 00:41    138    |  96     |  43     ----------------------------<  98     4.5     |  25     |  4.85     Ca    9.6        19 Dec 2024 00:41  Phos  4.3       19 Dec 2024 00:41  Mg     2.4       19 Dec 2024 00:41    PT/INR - ( 19 Dec 2024 00:41 )   PT: 13.6 sec;   INR: 1.20 ratio      Urinalysis Basic - ( 19 Dec 2024 11:39 )    Color: Yellow / Appearance: Clear / S.014 / pH: x  Gluc: x / Ketone: Trace mg/dL  / Bili: Negative / Urobili: 0.2 mg/dL   Blood: x / Protein: 300 mg/dL / Nitrite: Negative   Leuk Esterase: Negative / RBC: 0 /HPF / WBC 7 /HPF   Sq Epi: x / Non Sq Epi: 0 /HPF / Bacteria: Negative /HPF    acetaminophen     Tablet .. 650 milliGRAM(s) Oral every 6 hours PRN  ALPRAZolam 0.25 milliGRAM(s) Oral every 8 hours PRN  aspirin enteric coated 81 milliGRAM(s) Oral daily  atorvastatin 80 milliGRAM(s) Oral at bedtime  bisacodyl Suppository 10 milliGRAM(s) Rectal daily PRN  clopidogrel Tablet 75 milliGRAM(s) Oral daily  finasteride 5 milliGRAM(s) Oral daily  heparin   Injectable 5000 Unit(s) SubCutaneous every 8 hours  isosorbide   mononitrate ER Tablet (IMDUR) 30 milliGRAM(s) Oral daily  levothyroxine 175 MICROGram(s) Oral daily  magnesium hydroxide Suspension 30 milliLiter(s) Oral daily PRN  melatonin 3 milliGRAM(s) Oral at bedtime PRN  metoprolol tartrate 25 milliGRAM(s) Oral three times a day  multivitamin 1 Tablet(s) Oral daily  pantoprazole    Tablet 40 milliGRAM(s) Oral before breakfast  PARoxetine 30 milliGRAM(s) Oral daily  polyethylene glycol 3350 17 Gram(s) Oral at bedtime  senna 2 Tablet(s) Oral at bedtime  sevelamer carbonate 800 milliGRAM(s) Oral three times a day with meals    A/P:    Hx of ESRD on HD MWF at [place] HD unit w/Dr Vega  Adm to [facility] w/CP 12/15, NSTEMI  S/p cath at [facility] , 2v CAD, s/p PCI to LCx  Tx to [facility]  for PCI to RCA, done   S/p stable HD today   Renal diet  Next HD on Monday as ip or op  No objection to d/c from renal pov   D/w [facility] team    [phone]      
Patient is a 79y old  Male who presents with a chief complaint of transfer for staged PCI with rotational atherectomy (19 Dec 2024 11:34)    Date of servie : 24 @ 18:08  INTERVAL HPI/OVERNIGHT EVENTS:  T(C): 36.8 (24 @ 10:20), Max: 37.2 (24 @ 21:15)  HR: 80 (24 @ 16:31) (69 - 83)  BP: 142/66 (24 @ 16:31) (140/62 - 178/77)  RR: 18 (24 @ 16:31) (16 - 20)  SpO2: 96% (24 @ 16:31) (93% - 99%)  Wt(kg): --  I&O's Summary    18 Dec 2024 07:01  -  19 Dec 2024 07:00  --------------------------------------------------------  IN: 0 mL / OUT: 400 mL / NET: -400 mL    19 Dec 2024 07:01  -  19 Dec 2024 18:08  --------------------------------------------------------  IN: 0 mL / OUT: 200 mL / NET: -200 mL        LABS:                        9.3    8.91  )-----------( 320      ( 19 Dec 2024 00:41 )             29.4         138  |  96  |  43[H]  ----------------------------<  98  4.5   |  25  |  4.85[H]    Ca    9.6      19 Dec 2024 00:41  Phos  4.3       Mg     2.4           PT/INR - ( 19 Dec 2024 00:41 )   PT: 13.6 sec;   INR: 1.20 ratio         PTT - ( 19 Dec 2024 00:41 )  PTT:30.8 sec  Urinalysis Basic - ( 19 Dec 2024 11:39 )    Color: Yellow / Appearance: Clear / S.014 / pH: x  Gluc: x / Ketone: Trace mg/dL  / Bili: Negative / Urobili: 0.2 mg/dL   Blood: x / Protein: 300 mg/dL / Nitrite: Negative   Leuk Esterase: Negative / RBC: 0 /HPF / WBC 7 /HPF   Sq Epi: x / Non Sq Epi: 0 /HPF / Bacteria: Negative /HPF      CAPILLARY BLOOD GLUCOSE            Urinalysis Basic - ( 19 Dec 2024 11:39 )    Color: Yellow / Appearance: Clear / S.014 / pH: x  Gluc: x / Ketone: Trace mg/dL  / Bili: Negative / Urobili: 0.2 mg/dL   Blood: x / Protein: 300 mg/dL / Nitrite: Negative   Leuk Esterase: Negative / RBC: 0 /HPF / WBC 7 /HPF   Sq Epi: x / Non Sq Epi: 0 /HPF / Bacteria: Negative /HPF        MEDICATIONS  (STANDING):  aspirin enteric coated 81 milliGRAM(s) Oral daily  atorvastatin 80 milliGRAM(s) Oral at bedtime  clopidogrel Tablet 75 milliGRAM(s) Oral daily  finasteride 5 milliGRAM(s) Oral daily  heparin   Injectable 5000 Unit(s) SubCutaneous every 8 hours  isosorbide   mononitrate ER Tablet (IMDUR) 30 milliGRAM(s) Oral daily  levothyroxine 175 MICROGram(s) Oral daily  metoprolol tartrate 25 milliGRAM(s) Oral three times a day  multivitamin 1 Tablet(s) Oral daily  pantoprazole    Tablet 40 milliGRAM(s) Oral before breakfast  PARoxetine 30 milliGRAM(s) Oral daily  polyethylene glycol 3350 17 Gram(s) Oral at bedtime  senna 2 Tablet(s) Oral at bedtime  sevelamer carbonate 800 milliGRAM(s) Oral three times a day with meals    MEDICATIONS  (PRN):  acetaminophen     Tablet .. 650 milliGRAM(s) Oral every 6 hours PRN Temp greater or equal to 38C (100.4F), Mild Pain (1 - 3)  ALPRAZolam 0.25 milliGRAM(s) Oral every 8 hours PRN anxiety  bisacodyl Suppository 10 milliGRAM(s) Rectal daily PRN Constipation  melatonin 3 milliGRAM(s) Oral at bedtime PRN Insomnia          PHYSICAL EXAM:  GENERAL: NAD, well-groomed, well-developed  HEAD:  Atraumatic, Normocephalic  CHEST/LUNG: Clear to percussion bilaterally; No rales, rhonchi, wheezing, or rubs  HEART: Regular rate and rhythm; No murmurs, rubs, or gallops  ABDOMEN: Soft, Nontender, Nondistended; Bowel sounds present  EXTREMITIES:  2+ Peripheral Pulses, No clubbing, cyanosis, or edema  LYMPH: No lymphadenopathy noted  SKIN: No rashes or lesions    Care Discussed with Consultants/Other Providers [ ] YES  [ ] NO
Seen in [facility], no CP, SOB, on NC O2    Vital Signs Last 24 Hrs  T(C): 36.8 (12-19-24 @ 10:20), Max: 37.2 (12-18-24 @ 21:15)  T(F): 98.2 (12-19-24 @ 10:20), Max: 99 (12-19-24 @ 00:00)  HR: 79 (12-19-24 @ 11:15) (63 - 82)  BP: 154/73 (12-19-24 @ 11:15) (98/71 - 178/77)  BP(mean): 107 (12-19-24 @ 11:15) (69 - 111)  RR: 20 (12-19-24 @ 11:15) (16 - 25)  SpO2: 94% (12-19-24 @ 11:15) (93% - 100%)    I&O's Detail    18 Dec 2024 07:01  -  19 Dec 2024 07:00  --------------------------------------------------------  OUT:    Voided (mL): 400 mL  Total OUT: 400 mL    s1s2  b/l air entry  soft, ND  sm edema, LUE AVF + bruit                         9.3    8.91  )-----------( 320      ( 19 Dec 2024 00:41 )             29.4     19 Dec 2024 00:41    138    |  96     |  43     ----------------------------<  98     4.5     |  25     |  4.85     Ca    9.6        19 Dec 2024 00:41  Phos  4.3       19 Dec 2024 00:41  Mg     2.4       19 Dec 2024 00:41    PT/INR - ( 19 Dec 2024 00:41 )   PT: 13.6 sec;   INR: 1.20 ratio      Culture - Blood (collected 16 Dec 2024 13:37)  Source: .Blood BLOOD  Preliminary Report (18 Dec 2024 19:01):    No growth at 48 Hours    Culture - Blood (collected 16 Dec 2024 13:30)  Source: .Blood BLOOD  Preliminary Report (18 Dec 2024 19:01):    No growth at 48 Hours    acetaminophen     Tablet .. 650 milliGRAM(s) Oral every 6 hours PRN  ALPRAZolam 0.25 milliGRAM(s) Oral every 8 hours PRN  aspirin enteric coated 81 milliGRAM(s) Oral daily  atorvastatin 80 milliGRAM(s) Oral at bedtime  bisacodyl Suppository 10 milliGRAM(s) Rectal daily PRN  clopidogrel Tablet 75 milliGRAM(s) Oral daily  finasteride 5 milliGRAM(s) Oral daily  heparin   Injectable 5000 Unit(s) SubCutaneous every 8 hours  isosorbide   mononitrate ER Tablet (IMDUR) 30 milliGRAM(s) Oral daily  levothyroxine 175 MICROGram(s) Oral daily  melatonin 3 milliGRAM(s) Oral at bedtime PRN  metoprolol tartrate 25 milliGRAM(s) Oral three times a day  multivitamin 1 Tablet(s) Oral daily  pantoprazole    Tablet 40 milliGRAM(s) Oral before breakfast  PARoxetine 30 milliGRAM(s) Oral daily  polyethylene glycol 3350 17 Gram(s) Oral at bedtime  senna 2 Tablet(s) Oral at bedtime  sevelamer carbonate 800 milliGRAM(s) Oral three times a day with meals    A/P:    Hx of ESRD on HD MWF at [place] HD unit w/Dr Vega  Adm to [facility] w/CP 12/15, NSTEMI  S/p cath at [facility] 12/17, 2v CAD  Tx to [facility] 12/18 for PCI to RCA   Plan for HD in am  Renal diet  Phoslo for high Phos   [facility]    [phone]      
Patient is a 79y old  Male who presents with a chief complaint of transfer for staged PCI with rotational atherectomy (18 Dec 2024 22:05)          Allergies    No Known Allergies    Intolerances        Medications:  acetaminophen     Tablet .. 650 milliGRAM(s) Oral every 6 hours PRN  ALPRAZolam 0.25 milliGRAM(s) Oral every 8 hours PRN  aspirin enteric coated 81 milliGRAM(s) Oral daily  atorvastatin 80 milliGRAM(s) Oral at bedtime  bisacodyl Suppository 10 milliGRAM(s) Rectal daily PRN  clopidogrel Tablet 75 milliGRAM(s) Oral daily  finasteride 5 milliGRAM(s) Oral daily  heparin   Injectable 5000 Unit(s) SubCutaneous every 8 hours  isosorbide   mononitrate ER Tablet (IMDUR) 30 milliGRAM(s) Oral daily  levothyroxine 175 MICROGram(s) Oral daily  melatonin 3 milliGRAM(s) Oral at bedtime PRN  metoprolol tartrate 25 milliGRAM(s) Oral three times a day  multivitamin 1 Tablet(s) Oral daily  pantoprazole    Tablet 40 milliGRAM(s) Oral before breakfast  PARoxetine 30 milliGRAM(s) Oral daily  polyethylene glycol 3350 17 Gram(s) Oral at bedtime  sevelamer carbonate 800 milliGRAM(s) Oral three times a day with meals      Vitals:  T(C): 37.2 (24 @ 00:00), Max: 37.2 (24 @ 21:15)  HR: 71 (24 @ 00:00) (62 - 84)  BP: 146/65 (24 @ 00:00) (98/71 - 156/59)  BP(mean): 97 (24 @ 21:15) (69 - 97)  RR: 17 (24 @ 00:00) (14 - 25)  SpO2: 93% (24 @ 00:00) (93% - 100%)  Wt(kg): --  Daily Height in cm: 170.18 (18 Dec 2024 21:19)    Daily Weight in k.5 (18 Dec 2024 21:19)  I&O's Summary        Physical Exam:  Appearance: Normal  Eyes: PERRL, EOMI  HENT: Normal oral [place], NC/AT  Cardiovascular: S1S2, RRR, No M/R/G, no JVD, No Lower extremity edema  Procedural Access Site: No hematoma, Non-tender to palpation, 2+ pulse, No bruit, No Ecchymosis  Respiratory: Clear to auscultation bilaterally  Gastrointestinal: Soft, Non tender, Normal Bowel Sounds  Musculoskeletal: No clubbing, No joint deformity   Neurologic: Non-focal  Lymphatic: No lymphadenopathy  Psychiatry: AAOx3, Mood & affect appropriate  Skin: No rashes, No ecchymoses, No cyanosis        138  |  96  |  43[H]  ----------------------------<  98  4.5   |  25  |  4.85[H]    Ca    9.6      19 Dec 2024 00:41  Phos  4.3       Mg     2.4         TPro  7.1  /  Alb  3.1[L]  /  TBili  0.5  /  DBili  x   /  AST  44[H]  /  ALT  23  /  AlkPhos  76  12    PT/INR - ( 19 Dec 2024 00:41 )   PT: 13.6 sec;   INR: 1.20 ratio         PTT - ( 19 Dec 2024 00:41 )  PTT:30.8 sec        Lipid panel   Hgb A1c                         9.3    8.91  )-----------( 320      ( 19 Dec 2024 00:41 )             29.4         ECG: SR 70 bpm

## 2024-12-20 NOTE — DISCHARGE NOTE PROVIDER - CARE PROVIDER_API CALL
Elier Vazquez  Interventional Cardiology  [address] [place] [id number]  Phone: [phone]  Fax: [phone]  Follow Up Time: 1 week

## 2024-12-20 NOTE — PROGRESS NOTE ADULT - ASSESSMENT
79M w/ hx of ESRD on HD (MWF via LUE AVF), acquired hypothyroidism 2/2 thyroid cancer s/p thyroidectomy, lung cancer, HTN, HLD, BPH, anxiety/depression, MGUS, anemia, former smoker, initially presented to [facility] on 12/15/24 for CP radiating to LUE/neck/jaw a/w nausea and vomiting. Found to have NSTEMI. s/p LHC (12/17/24) showing 90% pRCA, 95% mRCA, 80% dLCx and received DESx1 to dLCx. Now transferred to [facility] for staged PCI with rotational atherectomy of RCA on 12/19/24.      NSTEMI (non-ST elevation myocardial infarction).   - telemonitor   - cards fu   - sp staged PCI   - c/w current meds   - monitor on telemetry       ESRD on dialysis.   - dw renal attending  - HD in am   - c/w sevelamer     BPH (benign prostatic hyperplasia).   - cw finasteride     Constipation.   ·  Plan: - c/w senna and miralax daily as well as bisacodyl rectal suppository PRN  - monitor for BMs.     Anxiety and depression.   -  c/w paroxetine     Acquired hypothyroidism.   - c/w home levothyroxine.    MGUS (monoclonal gammopathy of unknown significance).   - continue outpt follow-up with Heme/Onc.     Lung cancer.   - outpt follow-up for continued surveillance.    Physical therapy and dc planning   
79M w/ hx of ESRD on HD (MWF via LUE AVF), acquired hypothyroidism 2/2 thyroid cancer s/p thyroidectomy, lung cancer, HTN, HLD, BPH, anxiety/depression, MGUS, anemia, former smoker, initially presented to [facility] on 12/15/24 for CP radiating to LUE/neck/jaw a/w nausea and vomiting. Found to have NSTEMI. s/p LHC (12/17/24) showing 90% pRCA, 95% mRCA, 80% dLCx and received DESx1 to dLCx. Now transferred to [facility] for staged PCI with rotational atherectomy of RCA on 12/19/24.      NSTEMI (non-ST elevation myocardial infarction).   - telemonitor   - cards fu   - staged PCI today  - c/w ASA, Plavix, atorvastatin, metoprolol and imdur for now  - monitor on telemetry  - dw cards attending      ESRD on dialysis.   - dw renal attending  - HD in am   - c/w sevelamer     BPH (benign prostatic hyperplasia).   - cw finasteride     Constipation.   ·  Plan: - c/w senna and miralax daily as well as bisacodyl rectal suppository PRN  - monitor for BMs.     Anxiety and depression.   -  c/w paroxetine     Acquired hypothyroidism.   - c/w home levothyroxine.    MGUS (monoclonal gammopathy of unknown significance).   - continue outpt follow-up with Heme/Onc.     Lung cancer.   - outpt follow-up for continued surveillance.    Physical therapy     dw intervention cardiologist 
80 y/o male with above pmhx, transfer from [facility] for cardiac cath on 12/19.

## 2024-12-20 NOTE — DISCHARGE NOTE NURSING/CASE MANAGEMENT/SOCIAL WORK - FINANCIAL ASSISTANCE
[facility] provides services at a reduced cost to those who are determined to be eligible through [facility]’s financial assistance program. Information regarding [facility]’s financial assistance program can be found by going to [email or web address] or by calling [phone].

## 2024-12-20 NOTE — DISCHARGE NOTE PROVIDER - NSDCMRMEDTOKEN_GEN_ALL_CORE_FT
acetaminophen 325 mg oral tablet: 2 tab(s) orally every 6 hours As needed Temp greater or equal to 38C (100.4F), Mild Pain (1 - 3)  ALPRAZolam 0.25 mg oral tablet: 1 tab(s) orally every 8 hours As needed anxiety  aspirin 81 mg oral delayed release tablet: 1 tab(s) orally every 24 hours  atorvastatin 80 mg oral tablet: 1 tab(s) orally once a day (at bedtime)  bisacodyl 10 mg rectal suppository: 1 suppository(ies) rectal once a day As needed Constipation  clopidogrel 75 mg oral tablet: 1 tab(s) orally every 24 hours  finasteride 5 mg oral tablet: 1 tab(s) orally once a day  gabapentin 100 mg oral tablet: 1 tab(s) orally 3 times a day (Note: home med per pt)  isosorbide mononitrate 30 mg oral tablet, extended release: 1 tab(s) orally once a day  levothyroxine 175 mcg (0.175 mg) oral tablet: 1 tab(s) orally once a day (Note: home med per pt)  melatonin 3 mg oral tablet: 1 tab(s) orally once a day (at bedtime) As needed Insomnia  metoprolol tartrate 25 mg oral tablet: 1 tab(s) orally 3 times a day  midodrine 5 mg oral tablet: 1 tab(s) orally on dialysis days only (Note: home med per pt)  Multiple Vitamins oral tablet: 1 tab(s) orally once a day  pantoprazole 40 mg oral delayed release tablet: 1 tab(s) orally once a day  PARoxetine 40 mg oral tablet: 1 tab(s) orally once a day  patiromer: 1 packet on non-dialysis days  polyethylene glycol 3350 oral powder for reconstitution: 17 gram(s) orally once a day (at bedtime)  sertraline 150 mg oral capsule: 1 cap(s) orally once a day (Note: home med per pt)  sevelamer carbonate 800 mg oral tablet: 3 tab(s) orally 3 times a day (with meals) (Note: home med per pt)  tamsulosin 0.4 mg oral capsule: 1 cap(s) orally once a day (Note: home med per pt)   acetaminophen 325 mg oral tablet: 2 tab(s) orally every 6 hours As needed Temp greater or equal to 38C (100.4F), Mild Pain (1 - 3)  ALPRAZolam 0.25 mg oral tablet: 1 tab(s) orally every 8 hours As needed anxiety  aspirin 81 mg oral delayed release tablet: 1 tab(s) orally once a day  atorvastatin 80 mg oral tablet: 1 tab(s) orally once a day (at bedtime) x 90 days  bisacodyl 10 mg rectal suppository: 1 suppository(ies) rectal once a day As needed Constipation  clopidogrel 75 mg oral tablet: 1 tab(s) orally once a day  finasteride 5 mg oral tablet: 1 tab(s) orally once a day  gabapentin 100 mg oral tablet: 1 tab(s) orally 3 times a day (Note: home med per pt)  isosorbide mononitrate 30 mg oral tablet, extended release: 1 tab(s) orally once a day  levothyroxine 175 mcg (0.175 mg) oral tablet: 1 tab(s) orally once a day (Note: home med per pt)  melatonin 3 mg oral tablet: 1 tab(s) orally once a day (at bedtime) As needed Insomnia  metoprolol tartrate 25 mg oral tablet: 1 tab(s) orally 3 times a day  midodrine 5 mg oral tablet: 1 tab(s) orally on dialysis days only (Note: home med per pt)  Multiple Vitamins oral tablet: 1 tab(s) orally once a day  Occupational Therapy: Resume as scheduled MDD: 1  pantoprazole 40 mg oral delayed release tablet: 1 tab(s) orally once a day  PARoxetine 40 mg oral tablet: 1 tab(s) orally once a day  patiromer: 1 packet on non-dialysis days  physical therapy: resume as scheduled MDD: 1  polyethylene glycol 3350 oral powder for reconstitution: 17 gram(s) orally once a day (at bedtime)  sertraline 150 mg oral capsule: 1 cap(s) orally once a day (Note: home med per pt)  sevelamer carbonate 800 mg oral tablet: 3 tab(s) orally 3 times a day (with meals) (Note: home med per pt)  tamsulosin 0.4 mg oral capsule: 1 cap(s) orally once a day (Note: home med per pt)

## 2024-12-20 NOTE — PROGRESS NOTE ADULT - REASON FOR ADMISSION
transfer for staged PCI with rotational atherectomy

## 2024-12-20 NOTE — DISCHARGE NOTE PROVIDER - NSDCFUSCHEDAPPT_GEN_ALL_CORE_FT
Elier Vazquez Physician [facility]  CARDIOLOGY [address] [facility]  Scheduled Appointment: 12/23/2024

## 2024-12-20 NOTE — DISCHARGE NOTE NURSING/CASE MANAGEMENT/SOCIAL WORK - PATIENT PORTAL LINK FT
You can access the FollowMyHealth Patient Portal offered by  by registering at the following website: [email or web address]. By joining [employer]’s FollowMyHealth portal, you will also be able to view your health information using other applications (apps) compatible with our system.

## 2024-12-20 NOTE — DISCHARGE NOTE PROVIDER - NSDCCPCAREPLAN_GEN_ALL_CORE_FT
PRINCIPAL DISCHARGE DIAGNOSIS  Diagnosis: CAD (coronary artery disease)  Assessment and Plan of Treatment: Low salt, low fat diet. Weight management.   If you have Stent in your heart, You must take Aspirin and Plavix (or Brilinta), must take medications as prescribed. Follow up with your cardiologist in 1-2 weeks.   If you are smoking, you need to STOP smoking.   Follow up appointments with your doctor(s)  as instructed.      SECONDARY DISCHARGE DIAGNOSES  Diagnosis: HTN (hypertension)  Assessment and Plan of Treatment: Continue with your blood pressure medications; eat a heart healthy diet with low salt diet; exercise regularly (consult with your physician or cardiologist first); maintain a heart healthy weight; if you smoke - quit (A resource to help you stop smoking is the [facility] – phone number [phone].); include healthy ways to manage stress. Continue to follow with your primary care physician or cardiologist.    Diagnosis: ESRD on dialysis  Assessment and Plan of Treatment: HD 3 times/week, Renal diet and f/u with nephrologist.

## 2024-12-20 NOTE — DISCHARGE NOTE PROVIDER - NSDCFUADDINST_GEN_ALL_CORE_FT
Wound Care:   the day AFTER your procedure remove bandage GENTLY, and clean using  mild soap and warm water stream, pat dry. Leave the area OPEN to air. YOU MAY SHOWER 24 hour after procedure.   DO NOT apply lotions, creams, ointments, powder, perfumes to the puncture site.  DO NOT SOAK the site for 1 week (no tub bath, no swimming, etc.)  Check  your groin and /or wrist daily. A small amount of bruising and sourness are normal.     ACTIVITY: for 24 hours   - DO NOT DRIVE  - DO NOT make any important decisions or sign legal documents   - DO NOT operate heavy duty machineries   - you may resume sexual activity in 48 hours, unless otherwise instructed by your cardiologist     If your procedure was done through the WRIST: for the NEXT 3DAYS:  - avoid pushing, pulling, with that affected wrist   - avoid repeated movement of that hand and wrist (eg: typing, hammering)  - DO NOT LIFT anything more than 5 lbs     If your procedure was done through the GROIN: for the NEXT 5 DAYS  - Limit climbing stairs, DO NOT soak in bathtub or pool  - no strenuous activities, pushing, pulling, straining  - Do not lift anything 10lbs or heavier     MEDICATION:   Take your medications as explained (see discharge paperwork)   If you received a STENT, you will be taking antiplatelet medications to KEEP YOUR STENT OPEN (eg: Aspirin, Plavix, Brilinta, Effient, etc).  Take as prescribed, DO NOT STOP taking them without consulting with your cardiologist first.     Follow heart healthy diet recommended by your doctor, if you smoke STOP SMOKING (may call [phone] for center of tobacco control if you need assistance)     CALL your doctor to make appointment in 2 WEEKS     ***CALL YOUR DOCTOR***  if you experience: fever, chills, body aches, or severe pain, swelling, redness, heat or yellow discharge at incision site  If you experience Bleeding or excruciating pain at the procedural site, swelling (golf ball size) at your procedural site  If you experience CHEST PAIN  If you experience extremity numbness, tingling, temperature change (of your procedural site)   If you are unable to reach your doctor, you may contact:   -Cardiology Office at [facility] at [phone] or [facility] [phone]- [facility] [phone]

## 2024-12-21 LAB
CULTURE RESULTS: SIGNIFICANT CHANGE UP
CULTURE RESULTS: SIGNIFICANT CHANGE UP
SPECIMEN SOURCE: SIGNIFICANT CHANGE UP
SPECIMEN SOURCE: SIGNIFICANT CHANGE UP

## 2024-12-23 ENCOUNTER — TRANSCRIPTION ENCOUNTER (OUTPATIENT)
Age: 79
End: 2024-12-23

## 2024-12-27 ENCOUNTER — TRANSCRIPTION ENCOUNTER (OUTPATIENT)
Age: 79
End: 2024-12-27

## 2025-01-02 ENCOUNTER — TRANSCRIPTION ENCOUNTER (OUTPATIENT)
Age: 80
End: 2025-01-02

## 2025-01-08 ENCOUNTER — APPOINTMENT (OUTPATIENT)
Dept: CARDIOLOGY | Facility: CLINIC | Age: 80
End: 2025-01-08
Payer: MEDICARE

## 2025-01-08 ENCOUNTER — NON-APPOINTMENT (OUTPATIENT)
Age: 80
End: 2025-01-08

## 2025-01-08 VITALS
HEIGHT: 67 IN | DIASTOLIC BLOOD PRESSURE: 35 MMHG | TEMPERATURE: 98.4 F | BODY MASS INDEX: 31.86 KG/M2 | SYSTOLIC BLOOD PRESSURE: 83 MMHG | HEART RATE: 89 BPM | OXYGEN SATURATION: 94 % | WEIGHT: 203 LBS

## 2025-01-08 VITALS — SYSTOLIC BLOOD PRESSURE: 102 MMHG | DIASTOLIC BLOOD PRESSURE: 54 MMHG

## 2025-01-08 PROCEDURE — 99215 OFFICE O/P EST HI 40 MIN: CPT

## 2025-01-08 PROCEDURE — 93000 ELECTROCARDIOGRAM COMPLETE: CPT

## 2025-01-08 RX ORDER — ATORVASTATIN CALCIUM 80 MG/1
80 TABLET, FILM COATED ORAL
Refills: 0 | Status: ACTIVE | COMMUNITY

## 2025-01-08 RX ORDER — LISINOPRIL 2.5 MG/1
2.5 TABLET ORAL
Refills: 0 | Status: ACTIVE | COMMUNITY

## 2025-01-08 RX ORDER — SERTRALINE HYDROCHLORIDE 150 MG/1
150 CAPSULE ORAL
Refills: 0 | Status: ACTIVE | COMMUNITY

## 2025-01-08 RX ORDER — CLOPIDOGREL 75 MG/1
75 TABLET, FILM COATED ORAL
Refills: 0 | Status: ACTIVE | COMMUNITY

## 2025-01-08 RX ORDER — GABAPENTIN 100 MG
100 TABLET ORAL
Refills: 0 | Status: ACTIVE | COMMUNITY

## 2025-01-08 RX ORDER — TAMSULOSIN HYDROCHLORIDE 0.4 MG/1
0.4 CAPSULE ORAL
Refills: 0 | Status: ACTIVE | COMMUNITY

## 2025-01-08 RX ORDER — MIDODRINE HYDROCHLORIDE 5 MG/1
5 TABLET ORAL
Refills: 0 | Status: ACTIVE | COMMUNITY

## 2025-01-09 DIAGNOSIS — Z87.448 PERSONAL HISTORY OF OTHER DISEASES OF URINARY SYSTEM: ICD-10-CM

## 2025-01-09 DIAGNOSIS — Z86.39 PERSONAL HISTORY OF OTHER ENDOCRINE, NUTRITIONAL AND METABOLIC DISEASE: ICD-10-CM

## 2025-01-15 ENCOUNTER — TRANSCRIPTION ENCOUNTER (OUTPATIENT)
Age: 80
End: 2025-01-15

## 2025-02-11 NOTE — H&P ADULT - REASON FOR ADMISSION
[facility] MEDICINE DISCHARGE SUMMARY NOTE    Patient: Seth Lancaster Discharge Date: 2/11/2025   YOB: 1941 Admission Date: 2/6/2025  2:40 AM   MRN: [id number] Admission Length: 5 day(s)     PCP: Vibha Rivero MD    Admitting Physician: No admitting provider for patient encounter. Discharge Physician: Tara Shane       Admission Information     Admission Diagnoses:   Coronary artery disease involving native coronary artery of native heart without angina pectoris [I25.10]  Severe sepsis (CMD) [A41.9, R65.20]    Admission Condition: fair  Condition at Discharge:  stable     Primary Discharge Diagnoses:   Severe sepsis (CMD)  (primary encounter diagnosis)  Coronary artery disease involving native coronary artery of native heart without angina pectoris    Secondary Discharge Diagnoses:   Patient Active Problem List   Diagnosis    Type 2 diabetes mellitus without complication, with long-term current use of insulin  (CMD)    Coronary artery disease    Dyslipidemia    Systolic murmur    Fatty liver    Bladder tumor    Acute idiopathic gout of multiple sites    B12 deficiency    BPH with obstruction/lower urinary tract symptoms    Chronic cystitis    Moderate dementia without behavioral disturbance, psychotic disturbance, mood disturbance, or anxiety (CMD)    Generalized weakness    Atherosclerosis of aorta (CMD)    Hypotension    Personal history of fall    Prostate cancer  (CMD)    Personal history of malignant neoplasm of prostate    COVID-19 virus detected    Encephalopathy, unspecified type    Severe sepsis (CMD)    Decubitus ulcer of left buttock, stage 2  (CMD)    Cognitive communication deficit    Vitamin B12 deficiency anemia due to intrinsic factor deficiency       Code status/Goals of care discussion:  Do Not Resuscitate         TCM Follow up      Radiology tests requiring followup: None     Other tests/treatment requiring follow up :  Follow-up with PCP in one  MDD week      Studies pending at time of discharge:   Unresulted Labs (From admission, onward)       Start     Ordered    02/06/25 0700  Metered Blood Glucose 4 times daily, before meals and bedtime  4 TIMES DAILY BEFORE MEALS & N,   Routine       02/06/25 0600                  Unresulted Procedure (From admission, onward)      None              Needs further Goals of care discussion No     Discharge Medications:      Summary of your Discharge Medications        Take these Medications        Details   Accu-Chek Guide test strip   Generic drug: blood glucose  USE TO TEST BLOOD SUGAR TWICE  DAILY FOR TYPE 2 DIABETES  MELLITUS WITHOUT COMPLICATION,  WITH LONG-TERM CURRENT USE OF  INSULIN  Comment: Please send a replace/new response with 100-Day Supply if appropriate to maximize member benefit. Requesting 1 year supply.     Accu-Chek Guide w/Device Kit   USE TO TEST BLOOD SUGAR TWICE  DAILY FOR TYPE 2 DIABETES  MELLITUS WITHOUT COMPLICATION,  WITH LONG-TERM CURRENT USE OF  INSULIN  Comment: Please send a replace/new response with 100-Day Supply if appropriate to maximize member benefit. Requesting 1 year supply.     acetaminophen 500 MG tablet  Commonly known as: TYLENOL   Take 1,000 mg by mouth daily as needed for Pain (mild to moderate pain). Indications: Fever, Pain     Aspirin 81 MG Cap   Take 1 capsule by mouth daily.     atorvastatin 80 MG tablet  Commonly known as: LIPITOR   TAKE 1 TABLET BY MOUTH DAILY  Comment: Please send a replace/new response with 100-Day Supply if appropriate to maximize member benefit. Requesting 1 year supply.     diclofenac 1 % gel  Commonly known as: VOLTAREN   APPLY 2 GRAMS TOPICALLY 4 TIMES  DAILY     insulin glargine 100 UNIT/ML pen-injector   Inject 6 Units into the skin nightly. Prime 2 units before each dose.     Insulin Pen Needle 32G X 4 MM Misc   Use nightly as directed     Januvia 100 MG tablet   Generic drug: SITagliptin  TAKE 1 TABLET BY MOUTH DAILY  Comment: Please send a  replace/new response with 100-Day Supply if appropriate to maximize member benefit. Requesting 1 year supply.     metFORMIN 500 MG tablet  Commonly known as: GLUCOPHAGE   TAKE 2 TABLETS BY MOUTH DAILY  WITH BREAKFAST  Comment: Requesting 1 year supply     metoPROLOL tartrate 25 MG tablet  Commonly known as: LOPRESSOR   Take 0.5 tablets by mouth every 12 hours. Indications: High Blood Pressure     nystatin [id number] UNIT/GM powder  Commonly known as: MYCOSTATIN   Apply topically 3 times daily as needed (fungal infection to skin folds).     polyethylene glycol 17 g packet  Commonly known as: MIRALAX   Take 17 g by mouth daily. Do not start before January 24, 2024. Stir and dissolve powder in any 4 to 8 ounces of beverage, then drink. Hold for loose stools.     SOFTCLIX LANCETS Misc   USE TO TEST BLOOD SUGAR TWICE  DAILY FOR TYPE 2 DIABETES  WITHOUT COMPLICATION, WITH  LONG-TERM CURRENT USE OF INSULIN  Comment: Please send a replace/new response with 100-Day Supply if appropriate to maximize member benefit. Requesting 1 year supply.     tamsulosin 0.4 MG Cap  Commonly known as: FLOMAX   TAKE 1 CAPSULE BY MOUTH DAILY  AFTER A MEAL  Comment: Please send a replace/new response with 100-Day Supply if appropriate to maximize member benefit. Requesting 1 year supply.     UTI-Stat liquid   Take 30 mLs by mouth in the morning and 30 mLs in the evening.                  Home Health referral:  Yes                Follow-up    Home hospice scheduled, follow-up with PCP in one week    Discharge Instructions     Diet: resume prior diet Activity:  activity as tolerated   Wound Care: keep wound clean and dry Disposition: Home with home health care     Other instructions:     Continue to monitor symptoms        Hospital Course   Admission Narrative:    Seth Lancaster is a 83 year old male with CAD, post CABG, T2DM, advanced senile dementia, anemia, HTN, history of prostate cancer presented to the ED on 2/6/2025 with altered mental  status and cough. He was diagnosed with severe sepsis with acute hypoxemic respiratory failure suspected to be related to aspiration pneumonia in the setting of COVID-19 infection. He was started on Unasyn with complete resolution of signs and symptoms of sepsis. His last antibiotic dose was this morning. He had acute metabolic encephalopathy probable secondary to severe sepsis with underlying advanced dementia. Chronic sacral decubitus ulcer was present on admission, wound care was consulted. While in the hospital his DVT prophylaxis was unfractionated heparin. The patient is DNR and he is being discharged home with home hospice care.  The following is a detailed list of problems managed during this hospitalization:    1.  Severe sepsis with acute hypoxemic respiratory failure suspected to be related to aspiration pneumonia.  Currently on Unasyn with complete resolution of signs/symptoms of sepsis.  Patient has completed 5 days of antibiotic therapy with complete resolution of symptoms.     2.  Acute metabolic encephalopathy most likely related to severe sepsis.  This is developed in the setting of advanced underlying dementia.  Improved.     3.  Advanced senile dementia.  Durable healthcare power of  has been previously activated.     4.  Type 2 diabetes mellitus.  Well-controlled for patient's age as suggested by A1c of 8.1 last year.  Good control during hospitalization.     5. Coronary artery disease status post with CABG and PTCAs on aspirin, beta-blocker and statin.  Appears to be stable at this time.     6. Normocytic normochromic anemia.  Appears to be stable at this time.  There were no evidence of active bleeding.     7. Chronic sacral decubitus ulcer, present on admission.  Wound Care has been consulted.     8. Essential hypertension.  Well controlled at this time.      9. History of prostate cancer managed by Dr. Ellington.       10. Anemia of chronic disease, no evidence of active bleeding at  this time.  Will continue monitoring.        Consultants:  Social work, dietitian, PT/OT and speech therapy consult.         Discharge Physical Exam     General: In NAD. Conversant. Awake, Alert, Oriented to person only.  Vital Signs: Visit Vitals  BP (!) 142/77 (BP Location: LUE - Left upper extremity, Patient Position: Semi-Pacheco's) Comment: rn notified   Pulse 71   Temp 97.5 °F (36.4 °C)   Resp 16   Ht 5' 9\" (1.753 m)   Wt 53.7 kg (118 lb 6.2 oz)   SpO2 95%   BMI 17.48 kg/m²     Skin: Moist and warm, no rashes  Neck: Supple, symmetric. No JVD, No carotid bruit. Normal carotid upstroke and amplitude.   Respiratory: CTA B/L. Good effort.  Cardiovascular: S1, S2. RRR. 2+ pedal pulses  Abdomen: Soft, nontender, NRT, No HSM  Extremities: No edema or calf tenderness B/L. No acrocyanosis.   Neurologic: CN II-XII are grossly intact.  Expressive aphasia with word finding difficulty, \"word salad.\".    Wt Readings from Last 3 Encounters:   02/11/25 53.7 kg (118 lb 6.2 oz)   01/22/25 70 kg (154 lb 5.2 oz)   09/30/24 69.5 kg (153 lb 3.2 oz)           Time taken to coordinate discharge care:  More than 30 minutes.    Discharge instructions, medications and followup appointment were discussed with patient/family and after visit summary was provided.     EVAN Sarmiento, scribing for Dr. Mcgrath  2/11/2025  1:25 PM    Patient seen and examined with PA student. PA student note reviewed and edited, corrected and discussed with PA. Exam findings, assessment and plan confirmed.   I attest that PA student acted as my scribe and I agree with the note as recorded.    Manny Mcgrath MD    CC:   Vibha Rivero MD

## 2025-03-27 NOTE — ED PROVIDER NOTE - IV ALTEPLASE EXCL REL HIDDEN
PATIENT INSTRUCTIONS:      Given by:   Nurse    Instructed in:  If yes, include date/comment and person who did the instructions       A.D.L:       NA                Activity:      NA           Diet::          Yes       Resume normal diet for age. You will want to offer softer foods for the next couple of days. Encourage patient to drink plenty of fluids.     Medication:  Yes     Give over the counter acetaminophen and/or ibuprofen as directed by physician. Take entire course of antibiotics. Do not skip any doses and do not stop giving them even if patient is feeling better.     Equipment:  NA    Treatment:  NA      Other:          NA    Education Class:  NA    Patient/Family verbalized/demonstrated understanding of above Instructions:  yes  __________________________________________________________________________    OBJECTIVE CHECKLIST  Patient/Family has:    All medications brought from home   NA  Valuables from safe                            NA  Prescriptions                                       Yes  All personal belongings                       Yes  Equipment (oxygen, apnea monitor, wheelchair)     NA  Other: NA    _________________________________________________________________________    Rehabilitation Follow-up: NA    Special Needs on Discharge (Specify) NA          
show

## 2025-05-28 NOTE — PROGRESS NOTE ADULT - ASSESSMENT
LVM FOR PT TO CALL BACK AND R/S 5/29 APPT PER MESSAGE FROM PATIENT. OK FOR HUB TO SCHEDULED NEXT AVAILABLE ON A TUES, THURS, OR FRIDAY MORNING.    80 y/o male with HTN, dyslipidemia, and ESRD on HD via L AVF on M, W, F, who is admitted with NSTEMI.  LVEF is 59% on TTE.  Cardiac catheterization 12/17/2024 revealed dLCX 80% and p/mRCA 95%.  HE was treated with a successful PCI/BOB x1.      Plan:     - Pt is already in-patient, observe overnight in CPU  - Post cath/PCI routine VS, access site, neuro-vascular monitoring and RUE ***R groin/RLE post access precautions ordered  - post cath access site precautions reviewed with pt who verbalized good understanding  post cath hydration as ordered  ***post cath hydration not ordered d/t ***elevated LVEDP/AS/ESRD/HFrEF  Bedrest. May get OOB 30 minutes after radial band removed if wrist and hemodynamics remain stable   ***Bedrest. May sit up in 2 hours and get OOB in 4 hours after femoral sheath removed/hemostasis achieved and groin / hemodynamics remain stable   EKG post cath done  f/u labs and EKG in am  continue dual anti platelet therapy with aspirin AND clopidogrel ***ticagrelor  Pt education provided/reinforced re: importance of strict adherence to uninterrupted DAPT for minimum of 9-12 months (cardiologist will determine duration)  Patient educated on benefits of Cardiac Rehab Program; referral provided to patient. Referral faxed and copy placed in medical record. Patient given list of locations with phone numbers of local rehab facilities and advised to contact their insurance company for participating providers. Patient educated on need to bring discharge documents including cardiovascular history, medications, and testing/treatments to first appointment.  continue statin, beta blocker, ACEI/ARB  ***GDMT for HF deferred to primary cardiologist  may resume prior diet  Lifestyle modifications discussed to reduce cardiovascular risk factors including weight reduction, smoking cessation (referral provided if applicable), medication compliance, and routine follow up with Cardiologist to track your BMI, cholesterol, and glucose levels.   Discharge in am if stable  follow-up on *** with Dr Bernal for post PCI check  follow-up in 2 weeks with outpatient/referring cardiologist  continue home medication regimen as appropriate  ***remainder of plan per primary team/non-interventional cardiology  above d/w hospitalist by Dr Bernal  78 y/o male with HTN, dyslipidemia, and ESRD on HD via L AVF on M, W, F, who is admitted with NSTEMI.  LVEF is 59% on TTE.  Cardiac catheterization 12/17/2024 revealed dLCX 80% and p/mRCA 95%.  HE was treated with a successful PCI/BOB x1.      Plan:     - Pt is already in-patient, observe overnight in CPU  - Plan for staged PCI with atherectomy of residual RCA disease at [facility] on Thursday, 12/19/2024.  Will need transfer arranged to hospitalist service in anticipation of staged PCI.    - Post cath/PCI routine VS, access site, neuro-vascular monitoring and RUE post access precautions ordered  - Post cath access site precautions reviewed with pt who verbalized good understanding  - Post cath hydration as ordered  - Bedrest. May get OOB 30 minutes after radial band removed if wrist and hemodynamics remain stable   - F/U labs and EKG in am  - Continue dual anti platelet therapy with aspirin AND clopidogrel  - Pt education provided/reinforced re: importance of strict adherence to uninterrupted DAPT for minimum of 9-12 months (cardiologist will determine duration)  - Patient educated on benefits of Cardiac Rehab Program; referral provided to patient. Referral faxed and copy placed in medical record. Patient given list of locations with phone numbers of local rehab facilities and advised to contact their insurance company for participating providers. Patient educated on need to bring discharge documents including cardiovascular history, medications, and testing/treatments to first appointment.  - Continue statin, beta blocker  - May resume prior diet  - Lifestyle modifications discussed to reduce cardiovascular risk factors including weight reduction, smoking cessation (referral provided if applicable), medication compliance, and routine follow up with Cardiologist to track your BMI, cholesterol, and glucose levels.   - Follow-up on 12/23/2024 @ 1 pm with Dr Vazquez for post PCI check  - Follow-up in 2 weeks with outpatient/referring cardiologist  - Remainder of plan per primary team/non-interventional cardiology    Above d/w hospitalist by Dr Vazquez  80 y/o male with HTN, dyslipidemia, and ESRD on HD via L AVF on M, W, F, who is admitted with NSTEMI.  LVEF is 59% on TTE.  Cardiac catheterization 12/17/2024 revealed dLCX 80% and p/mRCA 95%.  HE was treated with a successful PCI/BOB x1.      Plan:     - Pt is already in-patient, observe overnight in CPU  - Plan for staged PCI with atherectomy of residual RCA disease at [facility] on Thursday, 12/19/2024.  Will need transfer arranged to hospitalist service in anticipation of staged PCI.    - Post cath/PCI routine VS, access site, neuro-vascular monitoring and RUE post access precautions ordered  - Post cath access site precautions reviewed with pt who verbalized good understanding  - No post cath hydration d/t ESRD on HD  - Bedrest. May get OOB 30 minutes after radial band removed if wrist and hemodynamics remain stable   - F/U labs and EKG in am  - Continue dual anti platelet therapy with aspirin AND clopidogrel.  Discontinue heparin gtt  - Pt education provided/reinforced re: importance of strict adherence to uninterrupted DAPT for minimum of 9-12 months (cardiologist will determine duration)  - Patient educated on benefits of Cardiac Rehab Program; referral provided to patient. Referral faxed and copy placed in medical record. Patient given list of locations with phone numbers of local rehab facilities and advised to contact their insurance company for participating providers. Patient educated on need to bring discharge documents including cardiovascular history, medications, and testing/treatments to first appointment.  - Continue statin, beta blocker  - May resume prior diet  - Lifestyle modifications discussed to reduce cardiovascular risk factors including weight reduction, smoking cessation (referral provided if applicable), medication compliance, and routine follow up with Cardiologist to track your BMI, cholesterol, and glucose levels.   - Follow-up on 12/23/2024 @ 1 pm with Dr Vazquez for post PCI check  - Follow-up in 2 weeks with outpatient/referring cardiologist  - Remainder of plan per primary team/non-interventional cardiology    Above d/w hospitalist by Dr Vazquez

## 2025-06-16 NOTE — PATIENT PROFILE ADULT - TRANSPORTATION
Problem: PAIN - ADULT  Goal: Verbalizes/displays adequate comfort level or baseline comfort level  Description: Interventions:  - Encourage patient to monitor pain and request assistance  - Assess pain using appropriate pain scale  - Administer analgesics as ordered based on type and severity of pain and evaluate response  - Implement non-pharmacological measures as appropriate and evaluate response  - Consider cultural and social influences on pain and pain management  - Notify physician/advanced practitioner if interventions unsuccessful or patient reports new pain  - Educate patient/family on pain management process including their role and importance of  reporting pain   - Provide non-pharmacologic/complimentary pain relief interventions  Outcome: Progressing     Problem: INFECTION - ADULT  Goal: Absence or prevention of progression during hospitalization  Description: INTERVENTIONS:  - Assess and monitor for signs and symptoms of infection  - Monitor lab/diagnostic results  - Monitor all insertion sites, i.e. indwelling lines, tubes, and drains  - Monitor endotracheal if appropriate and nasal secretions for changes in amount and color  - [place] appropriate cooling/warming therapies per order  - Administer medications as ordered  - Instruct and encourage patient and family to use good hand hygiene technique  - Identify and instruct in appropriate isolation precautions for identified infection/condition  Outcome: Progressing  Goal: Absence of fever/infection during neutropenic period  Description: INTERVENTIONS:  - Monitor WBC  - Perform strict hand hygiene  - Limit to healthy visitors only  - No plants, dried, fresh or silk flowers with [place] in patient room  Outcome: Progressing     Problem: SAFETY ADULT  Goal: Patient will remain free of falls  Description: INTERVENTIONS:  - Educate patient/family on patient safety including physical limitations  - Instruct patient to call for assistance with activity   -  Consider consulting OT/PT to assist with strengthening/mobility based on AM PAC & JH-HLM score  - Consult OT/PT to assist with strengthening/mobility   - Keep Call bell within reach  - Keep bed low and locked with side rails adjusted as appropriate  - Keep care items and personal belongings within reach  - Initiate and maintain comfort rounds  - Make Fall Risk Sign visible to staff  - Offer Toileting every 2 Hours, in advance of need  - Initiate/Maintain bed alarm  - Obtain necessary fall risk management equipment: yellow socks.  - Apply yellow socks and bracelet for high fall risk patients  - Consider moving patient to room near nurses station  Outcome: Progressing  Goal: Maintain or return to baseline ADL function  Description: INTERVENTIONS:  -  Assess patient's ability to carry out ADLs; assess patient's baseline for ADL function and identify physical deficits which impact ability to perform ADLs (bathing, care of mouth/teeth, toileting, grooming, dressing, etc.)  - Assess/evaluate cause of self-care deficits   - Assess range of motion  - Assess patient's mobility; develop plan if impaired  - Assess patient's need for assistive devices and provide as appropriate  - Encourage maximum independence but intervene and supervise when necessary  - Involve family in performance of ADLs  - Assess for home care needs following discharge   - Consider OT consult to assist with ADL evaluation and planning for discharge  - Provide patient education as appropriate  - Monitor functional capacity and physical performance, use of AM PAC & JH-HLM   - Monitor gait, balance and fatigue with ambulation    Outcome: Progressing  Goal: Maintains/Returns to pre admission functional level  Description: INTERVENTIONS:  - Perform AM-PAC 6 Click Basic Mobility/ Daily Activity assessment daily.  - Set and communicate daily mobility goal to care team and patient/family/caregiver.   - Collaborate with rehabilitation services on mobility goals  if consulted  - Perform Range of Motion 3 times a day.  - Reposition patient every 2 hours.  - Dangle patient 3 times a day  - Stand patient 3 times a day  - Ambulate patient 3 times a day  - Out of bed to chair 3 times a day   - Out of bed for meals 3 times a day  - Out of bed for toileting  - Record patient progress and toleration of activity level   Outcome: Progressing     Problem: DISCHARGE PLANNING  Goal: Discharge to home or other facility with appropriate resources  Description: INTERVENTIONS:  - Identify barriers to discharge w/patient and caregiver  - Arrange for needed discharge resources and transportation as appropriate  - Identify discharge learning needs (meds, wound care, etc.)  - Arrange for interpretive services to assist at discharge as needed  - Refer to Case Management Department for coordinating discharge planning if the patient needs post-hospital services based on physician/advanced practitioner order or complex needs related to functional status, cognitive ability, or social support system  Outcome: Progressing      no